# Patient Record
Sex: FEMALE | Race: WHITE | NOT HISPANIC OR LATINO | Employment: OTHER | URBAN - METROPOLITAN AREA
[De-identification: names, ages, dates, MRNs, and addresses within clinical notes are randomized per-mention and may not be internally consistent; named-entity substitution may affect disease eponyms.]

---

## 2017-03-02 ENCOUNTER — TRANSFERRED RECORDS (OUTPATIENT)
Dept: HEALTH INFORMATION MANAGEMENT | Facility: CLINIC | Age: 77
End: 2017-03-02

## 2017-07-21 ENCOUNTER — TRANSFERRED RECORDS (OUTPATIENT)
Dept: HEALTH INFORMATION MANAGEMENT | Facility: CLINIC | Age: 77
End: 2017-07-21

## 2017-08-04 ENCOUNTER — TRANSFERRED RECORDS (OUTPATIENT)
Dept: HEALTH INFORMATION MANAGEMENT | Facility: CLINIC | Age: 77
End: 2017-08-04

## 2017-08-21 ENCOUNTER — TRANSFERRED RECORDS (OUTPATIENT)
Dept: HEALTH INFORMATION MANAGEMENT | Facility: CLINIC | Age: 77
End: 2017-08-21

## 2017-08-21 ENCOUNTER — MEDICAL CORRESPONDENCE (OUTPATIENT)
Dept: HEALTH INFORMATION MANAGEMENT | Facility: CLINIC | Age: 77
End: 2017-08-21

## 2017-10-02 ENCOUNTER — TRANSFERRED RECORDS (OUTPATIENT)
Dept: HEALTH INFORMATION MANAGEMENT | Facility: CLINIC | Age: 77
End: 2017-10-02

## 2017-11-19 ASSESSMENT — ENCOUNTER SYMPTOMS
DISTURBANCES IN COORDINATION: 1
DOUBLE VISION: 1
HEADACHES: 0
DIZZINESS: 1
TREMORS: 0
MEMORY LOSS: 0
LOSS OF CONSCIOUSNESS: 0
SPEECH CHANGE: 0
PARALYSIS: 0
NUMBNESS: 0
WEAKNESS: 0
TINGLING: 1
SEIZURES: 0

## 2017-11-20 NOTE — TELEPHONE ENCOUNTER
APPT INFO    Date /Time: 11/27/17   Reason for Appt: Myasthenia Gravis with Diplopia    [facility] Provider[facility]   Are there internal records? If yes, list: No   Patient Contact (Y/N) & Call Details: No referred  Care is out of Neuro/Ophtha [facility]   Action: Requested records from [facility]     OUTSIDE RECORDS CHECKLIST     CLINIC NAME COMMENTS REC (x) IMG (x)   Kenneth Samaniego Records sent to clinic x na   Dr Buchanan [facility]  x na

## 2017-11-21 NOTE — TELEPHONE ENCOUNTER
Records Received From:  [facility]     Date/Exam/Location  (specify location if different)   Office Notes:  8/4/17, 7/21/17, 3/17/17, 3/2/17, 8/4/17, 2/24/17   Procedure Notes:  (EMG/EEG/ECG/LP) - EMG 3/2/17   Labs:  2017

## 2017-11-27 ENCOUNTER — PRE VISIT (OUTPATIENT)
Dept: NEUROLOGY | Facility: CLINIC | Age: 77
End: 2017-11-27

## 2017-11-27 ENCOUNTER — OFFICE VISIT (OUTPATIENT)
Dept: NEUROLOGY | Facility: CLINIC | Age: 77
End: 2017-11-27

## 2017-11-27 VITALS
BODY MASS INDEX: 17.84 KG/M2 | SYSTOLIC BLOOD PRESSURE: 116 MMHG | OXYGEN SATURATION: 97 % | HEIGHT: 66 IN | TEMPERATURE: 98.1 F | DIASTOLIC BLOOD PRESSURE: 60 MMHG | WEIGHT: 111 LBS | RESPIRATION RATE: 20 BRPM | HEART RATE: 64 BPM

## 2017-11-27 DIAGNOSIS — G70.01 MYASTHENIA GRAVIS WITH EXACERBATION (H): Primary | ICD-10-CM

## 2017-11-27 DIAGNOSIS — Z79.60 LONG-TERM USE OF IMMUNOSUPPRESSANT MEDICATION: ICD-10-CM

## 2017-11-27 PROBLEM — E78.5 HYPERLIPIDEMIA: Status: ACTIVE | Noted: 2017-11-02

## 2017-11-27 PROBLEM — G70.00 MYASTHENIA GRAVIS (H): Status: ACTIVE | Noted: 2017-03-20

## 2017-11-27 RX ORDER — MULTIVITAMIN WITH IRON
1000 TABLET ORAL EVERY MORNING
COMMUNITY

## 2017-11-27 RX ORDER — ACETAMINOPHEN 160 MG
2000 TABLET,DISINTEGRATING ORAL EVERY MORNING
COMMUNITY

## 2017-11-27 RX ORDER — IPRATROPIUM BROMIDE 42 UG/1
2 SPRAY, METERED NASAL PRN
COMMUNITY
End: 2024-07-22

## 2017-11-27 RX ORDER — PREDNISONE 10 MG/1
20 TABLET ORAL DAILY
COMMUNITY
Start: 2017-10-04 | End: 2018-01-24

## 2017-11-27 RX ORDER — MULTIVIT WITH MINERALS/LUTEIN
1 TABLET ORAL DAILY
COMMUNITY
End: 2020-01-20

## 2017-11-27 RX ORDER — ALENDRONATE SODIUM 70 MG/1
70 TABLET ORAL WEEKLY
COMMUNITY
End: 2024-07-22

## 2017-11-27 RX ORDER — SULFAMETHOXAZOLE AND TRIMETHOPRIM 400; 80 MG/1; MG/1
1 TABLET ORAL EVERY MORNING
COMMUNITY
Start: 2017-11-10 | End: 2018-09-17

## 2017-11-27 RX ORDER — CALCIUM CARBONATE 500(1250)
1 TABLET ORAL EVERY MORNING
COMMUNITY

## 2017-11-27 RX ORDER — HYDROCODONE/ACETAMINOPHEN 5 MG-500MG
1 TABLET ORAL EVERY MORNING
COMMUNITY

## 2017-11-27 RX ORDER — ACETAMINOPHEN 325 MG/1
650 TABLET ORAL ONCE
Status: CANCELLED
Start: 2018-01-02

## 2017-11-27 RX ORDER — DIPHENHYDRAMINE HCL 25 MG
50 CAPSULE ORAL ONCE
Status: CANCELLED | OUTPATIENT
Start: 2018-01-02

## 2017-11-27 RX ORDER — PYRIDOSTIGMINE BROMIDE 60 MG/1
120 TABLET ORAL 4 TIMES DAILY
COMMUNITY
End: 2018-03-02

## 2017-11-27 RX ORDER — DIPHENOXYLATE HYDROCHLORIDE AND ATROPINE SULFATE 2.5; .025 MG/1; MG/1
1 TABLET ORAL EVERY MORNING
COMMUNITY

## 2017-11-27 ASSESSMENT — PAIN SCALES - GENERAL: PAINLEVEL: NO PAIN (0)

## 2017-11-27 NOTE — PROGRESS NOTES
2017             Micheline Lopez MD   [address] [address]      Liv Buchanan MD   [facility]    [address] [place] [address]       RE: Zahida Cespedes   MRN: [id number]    : 1940      Dear Doctors:       I had the pleasure to see Zahida Cespedes at the [facility] today.  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  Zahida is a 77-year-old woman who developed double vision around .  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  At that time her ophthalmologist called [facility], and she was seen at [place] towards the end of .  She had a single fiber EMG from her orbicularis oculi and frontalis, which demonstrated significantly increased jitter and blocking, and she had positive acetylcholine receptor antibodies.  A CT scan of the chest showed an equivocal lesion in the mediastinum, but she subsequently saw thoracic surgeons at [place], and underwent n MRI of the chest in the spring of 2017 and a repeat CT a month ago   The lesion was no more appreciated, and therefore the issue of possible thymoma was closed.  In any case, her double vision turned out difficult to treat, but its nature has changed over time. It used to be initially direction changing and variable, but now it is a fixed double vision that develops when she gazes to the right, and the images are vertically and somewhat obliquely . It is clearly binocular, and still extremely annoying.  She does not acknowledge diurnal variation of this double vision pattern now,  after receiving MG treatment.  She also developed right eyelid ptosis when she was first seen at [place] for this diagnosis, and the ptosis was clearly worse then than it is now, but it has not completely resolved.  She  was treated with Mestinon up to 120 mg 4 times a day and prednisone, which was gradually escalated to 50 mg daily.  She stayed on that dose for a good 6 weeks and then was gradually tapered by Dr. Buchanan.  She was on 30 mg until 11/11/2017, and now on 25 mg, we plan to decrease to 20 mg after 2 weeks.  I asked her whether she has noticed any worsening of her ocular symptoms during the taper of prednisone from the 50 down to 25 mg a day.  She does not feel anything has changed in the past 2-3 months.  She denies dysphagia, dysarthria, sialorrhea, difficulty chewing, head drop, change in voice or speech, weakness of arms or legs, respiratory symptoms or any other signs of generalized disease.  She has discussed with Dr. Buchanan the option of a short-term IVIG or azathioprine trial.     PAST MEDICAL HISTORY:  Relatively free of disease.  She has osteopenia, mild allergic rhinitis.        ALLERGIES: Bee venom- reaction edema.      MEDICATIONS:  As listed in Epic record.      FAMILY HISTORY:  Negative for myasthenia or other autoimmune disorders.      SOCIAL HISTORY:  No smoking.  Rare alcohol use.  No illicit drug use.  Lives with .        REVIEW OF SYSTEMS:  A 14 point review of systems is negative except as per HPI.        PHYSICAL EXAMINATION:   VITAL SIGNS:  Her blood pressure is 116/60.  Pulse is 64 and regular.  Weight is 50.3 kg.  Height is 167.  She endorses no pain.  Respiratory rate is 20/min.  O2 sat is 97% on room air.     NEUROLOGIC:  She is awake, alert and oriented x3.  She can provide a coherent history.  Visual fields are full to confrontation bilaterally.  There is ptosis chiefly in the right eyelid that is somewhat fatigable and worse with sustained upward gaze.  There is a positive curtain sign when lifting manually the right eyelid and a positive Cogan lid twitch.  She has mild weakness of the right orbicularis oculi and minimal to none on the left.  She has vertical misalignment of the eyes,  which could be either right eye hypotropia or left eye hypertropia.  The maximal deviation of the eyes is when looking right and upward.  She does not have any weakness of the orbicularis oris, smile, uvula, jaw, palate or tongue.  Facial sensation is intact in all distributions of the trigeminal nerves, and hearing is intact to finger rub bilaterally.  Neck flexion and extension strength is 5. There is no dysphonia or dysarthria. Strength is 5/5 in all muscles of the upper and lower extremities proximally and distally.  She can raise from a chair with arms crossed on the chest 3 times without major difficulty.        IMPRESSION:  Ocular myasthenia, quite refractory to treatment.      I spent about 45 minutes with the patient, of which more than half was in counseling.  She is a difficult ocular myasthenia case, and we see those from time to time.  I think she did have a response to Mestinon and oral prednisone, but it was partial/incomplete, and probably less than what we were hoping for.  This may be due to atrophy of the extraocular muscles as seen in burnt-out ocular myasthenia, but I think that we should probably try some more medications before we reach that conclusion.  Options include IVIG and oral immunosuppressants.  I told the patient that, from my previous experience, the response of patients with ocular symptoms to medications other than prednisone or pyridostigmine is highly variable, hard to predict, and sometimes poor or none. She understands that.      I offered her IVIG 0.4 g/kg daily x5; hopefully we will get this approved through insurance. We will get a baseline IgA level and creatinine before starting the IVIG.  I want her to come to clinic right after completing the infusions, to see if this has made any difference.  If she and I do not appreciate any improvement of her ocular symptoms and signs, I do not want to continue the IVIG for more than a month, and I made it clear to her.  If this  fails, the option would be oral medications, like mycophenolate, azathioprine or cyclosporine.  I discussed with her how those 3 options compare. I personally like mycophenolate because it works a little quicker, but it is hard to get it approved through Medicare.  Azathioprine is cheaper, but takes considerably longer to work.  I explained to her that all those medications need careful lab monitoring because of the risk of cytopenias, liver toxicity, etc.  She understands that.  As a last resort, if oral immunosuppressants and IVIG fail to improve her ocular symptoms, I would consider consulting a neuro-ophthalmologist and asking the patient to put crutches in her glasses, which can manually correct the ptosis, or even consider the use of a prism to correct the double vision.  While prisms are generally not favored in myasthenia due to the highly fluctuating and variable nature of the diplopia, in her case, if the diplopia is due to a fixed vertical misalignment, then it may respond to a prism, and I would explore this option.  I will see the patient in followup the week after she receives the IVIG, which hopefully will be within the next 2 months.        Thank you for allowing me to participate in her care.  She should continue prednisone at 20 mg daily and the current dose of Mestinon and continue the precautions for chronic steroid use, including checking her blood pressure regularly, checking her blood sugars by her Primary Care doctor, exercise regularly, annual Ophthalmology followup to monitor for cataracts or glaucoma, taking calcium and vitamin D, bisphosphonates and monitoring bone density annually, etc.      Sincerely,      MD JUAN DAVID Faith MD             D: 2017 13:21   T: 2017 14:56   MT: merissa      Name:     TACOS LARES   MRN:      [id number]        Account:      [id number]   :      1940           Service Date: 2017      Document:  [id number]      Answers for HPI/ROS submitted by the patient on 11/19/2017   General Symptoms: No  Skin Symptoms: No  HENT Symptoms: No  EYE SYMPTOMS: Yes  HEART SYMPTOMS: No  LUNG SYMPTOMS: No  INTESTINAL SYMPTOMS: No  URINARY SYMPTOMS: No  GYNECOLOGIC SYMPTOMS: No  BREAST SYMPTOMS: No  SKELETAL SYMPTOMS: No  BLOOD SYMPTOMS: No  NERVOUS SYSTEM SYMPTOMS: Yes  MENTAL HEALTH SYMPTOMS: No  Double vision: Yes  Flashing of lights: Yes  Trouble with coordination: Yes  Dizziness or trouble with balance: Yes  Fainting or black-out spells: No  Memory loss: No  Headache: No  Seizures: No  Speech problems: No  Tingling: Yes  Tremor: No  Weakness: No  Difficulty walking: No  Paralysis: No  Numbness: No

## 2017-11-27 NOTE — MR AVS SNAPSHOT
After Visit Summary   11/27/2017    Zahida Cespedes    MRN: [id number]           Patient Information     Date Of Birth          1940        Visit Information        Provider Department      11/27/2017 12:50 PM Erick Fernandez MD [facility]        Today's Diagnoses     Myasthenia gravis with exacerbation (H)    -  1    Long-term use of immunosuppressant medication           Follow-ups after your visit        Your next 10 appointments already scheduled     Nov 27, 2017  2:00 PM CST   LAB with  LAB   [facility] ([facility])    [address] [place] [id number] [phone]           Please do not eat 10-12 hours before your appointment if you are coming in fasting for labs on lipids, cholesterol, or glucose (sugar). This does not apply to pregnant women. Water, hot tea and black coffee (with nothing added) are okay. Do not drink other fluids, diet soda or chew gum.              Future tests that were ordered for you today     Open Future Orders        Priority Expected Expires Ordered    Creatinine Routine  11/27/2018 11/27/2017    IgA Routine  11/27/2018 11/27/2017            Who to contact     Please call your clinic at [phone] to:    Ask questions about your health    Make or cancel appointments    Discuss your medicines    Learn about your test results    Speak to your doctor   If you have compliments or concerns about an experience at your clinic, or if you wish to file a complaint, please contact [facility] Patient Relations at [phone] or email us at [email or web address]         Additional Information About Your Visit        MyChart Information     [employer] gives you secure access to your electronic health record. If you see a primary care provider, you can also send messages to your care team and make appointments. If you have questions, please call your primary care  "clinic.  If you do not have a primary care provider, please call [phone] and they will assist you.      [employer] is an electronic gateway that provides easy, online access to your medical records. With [employer], you can request a clinic appointment, read your test results, renew a prescription or communicate with your care team.     To access your existing account, please contact your [facility] or call [phone] for assistance.        Care EveryWhere ID     This is your Care EveryWhere ID. This could be used by other organizations to access your [facility] medical records  [id number]        Your Vitals Were     Pulse Temperature Respirations Height Pulse Oximetry Breastfeeding?    64 98.1  F (36.7  C) 20 1.676 m (5' 6\") 97% No    BMI (Body Mass Index)                   17.92 kg/m2            Blood Pressure from Last 3 Encounters:   11/27/17 116/60    Weight from Last 3 Encounters:   11/27/17 50.3 kg (111 lb)               Primary Care Provider    None Specified       No primary provider on file.        Equal Access to Services     LISA FONTAINE : Hadii liliana ku hadasho Soomaali, waaxda luqadaha, qaybta kaalmada adeegyada, joi meehan . So [facility] [phone].    ATENCIÓN: Si habla español, tiene a torres disposición servicios gratuitos de asistencia lingüística. Llame al [phone].    We comply with applicable federal civil rights laws and [place] laws. We do not discriminate on the basis of race, color, national origin, age, disability, sex, sexual orientation, or gender identity.            Thank you!     Thank you for choosing [facility]  for your care. Our goal is always to provide you with excellent care. Hearing back from our patients is one way we can continue to improve our services. Please take a few minutes to complete the written survey that you may receive in the mail after your visit with us. Thank you!             Your Updated " Medication List - Protect others around you: Learn how to safely use, store and throw away your medicines at www.disposemymeds.org.          This list is accurate as of: 11/27/17  1:14 PM.  Always use your most recent med list.                   Brand Name Dispense Instructions for use Diagnosis    alendronate 70 MG tablet    FOSAMAX     Take 70 mg by mouth once a week        ascorbic acid 1000 MG Tabs    vitamin C     Take 1 tablet by mouth daily        FISH OIL CONCENTRATE 300 MG Caps      Take 1,000 mg by mouth daily        ipratropium 0.06 % spray    ATROVENT     Spray 2 sprays in nostril as needed        Lutein 6 MG Caps      Take 1 capsule by mouth daily        MULTI-VITAMINS Tabs      Take 1 tablet by mouth daily        predniSONE 10 MG tablet    DELTASONE     Take 2.5 tablets by mouth daily        pyridostigmine 60 MG tablet    MESTINON     Take 120 mg by mouth 4 times daily        RA CALCIUM 1250 MG tablet   Generic drug:  calcium carbonate      Take 1 tablet by mouth daily        sulfamethoxazole-trimethoprim 400-80 MG per tablet    BACTRIM/SEPTRA     Take 1 tablet by mouth every morning        VITAMIN B COMPLEX PO      Take 1 capsule by mouth daily        vitamin D3 2000 UNITS Caps      Take 2,000 Units by mouth daily

## 2017-11-27 NOTE — LETTER
2017       RE: Zahida Cespedes  [address] [facility] [facility] [address]     Dear Colleague,    Thank you for referring your patient, Zahida Cespedes, to the [facility] at [facility]. Please see a copy of my visit note below.    2017          RE: Zahida Cespedes   MRN: [id number]    : 1940      Dear Doctors:       I had the pleasure to see Zahida Cespedes at the [facility] today.  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  Zahida is a 77-year-old woman who developed double vision around .  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  At that time her ophthalmologist called [facility], and she was seen at [place] towards the end of .  She had a single fiber EMG from her orbicularis oculi and frontalis, which demonstrated significantly increased jitter and blocking, and she had positive acetylcholine receptor antibodies.  A CT scan of the chest showed an equivocal lesion in the mediastinum, but she subsequently saw thoracic surgeons at [place] [facility], and underwent n MRI of the chest in the spring of 2017 and a repeat CT a month ago   The lesion was no more appreciated, and therefore the issue of possible thymoma was closed.  In any case, her double vision turned out difficult to treat, but its nature has changed over time. It used to be initially direction changing and variable, but now it is a fixed double vision that develops when she gazes to the right, and the images are vertically and somewhat obliquely . It is clearly binocular, and still extremely annoying.  She does not acknowledge diurnal variation of this double vision pattern now,  after receiving MG treatment.  She also developed right eyelid ptosis when she was first seen at [place] [facility] for this diagnosis,  and the ptosis was clearly worse then than it is now, but it has not completely resolved.  She was treated with Mestinon up to 120 mg 4 times a day and prednisone, which was gradually escalated to 50 mg daily.  She stayed on that dose for a good 6 weeks and then was gradually tapered by Dr. Buchanan.  She was on 30 mg until 11/11/2017, and now on 25 mg, we plan to decrease to 20 mg after 2 weeks.  I asked her whether she has noticed any worsening of her ocular symptoms during the taper of prednisone from the 50 down to 25 mg a day.  She does not feel anything has changed in the past 2-3 months.  She denies dysphagia, dysarthria, sialorrhea, difficulty chewing, head drop, change in voice or speech, weakness of arms or legs, respiratory symptoms or any other signs of generalized disease.  She has discussed with Dr. Buchanan the option of a short-term IVIG or azathioprine trial.     PAST MEDICAL HISTORY:  Relatively free of disease.  She has osteopenia, mild allergic rhinitis.        ALLERGIES: Bee venom- reaction edema.      MEDICATIONS:  As listed in Epic record.      FAMILY HISTORY:  Negative for myasthenia or other autoimmune disorders.      SOCIAL HISTORY:  No smoking.  Rare alcohol use.  No illicit drug use.  Lives with .        REVIEW OF SYSTEMS:  A 14 point review of systems is negative except as per HPI.        PHYSICAL EXAMINATION:   VITAL SIGNS:  Her blood pressure is 116/60.  Pulse is 64 and regular.  Weight is 50.3 kg.  Height is 167.  She endorses no pain.  Respiratory rate is 20/min.  O2 sat is 97% on room air.     NEUROLOGIC:  She is awake, alert and oriented x3.  She can provide a coherent history.  Visual fields are full to confrontation bilaterally.  There is ptosis chiefly in the right eyelid that is somewhat fatigable and worse with sustained upward gaze.  There is a positive curtain sign when lifting manually the right eyelid and a positive Cogan lid twitch.  She has mild weakness of the right  orbicularis oculi and minimal to none on the left.  She has vertical misalignment of the eyes, which could be either right eye hypotropia or left eye hypertropia.  The maximal deviation of the eyes is when looking right and upward.  She does not have any weakness of the orbicularis oris, smile, uvula, jaw, palate or tongue.  Facial sensation is intact in all distributions of the trigeminal nerves, and hearing is intact to finger rub bilaterally.  Neck flexion and extension strength is 5. There is no dysphonia or dysarthria. Strength is 5/5 in all muscles of the upper and lower extremities proximally and distally.  She can raise from a chair with arms crossed on the chest 3 times without major difficulty.        IMPRESSION:  Ocular myasthenia, quite refractory to treatment.      I spent about 45 minutes with the patient, of which more than half was in counseling.  She is a difficult ocular myasthenia case, and we see those from time to time.  I think she did have a response to Mestinon and oral prednisone, but it was partial/incomplete, and probably less than what we were hoping for.  This may be due to atrophy of the extraocular muscles as seen in burnt-out ocular myasthenia, but I think that we should probably try some more medications before we reach that conclusion.  Options include IVIG and oral immunosuppressants.  I told the patient that, from my previous experience, the response of patients with ocular symptoms to medications other than prednisone or pyridostigmine is highly variable, hard to predict, and sometimes poor or none. She understands that.      I offered her IVIG 0.4 g/kg daily x5; hopefully we will get this approved through insurance. We will get a baseline IgA level and creatinine before starting the IVIG.  I want her to come to clinic right after completing the infusions, to see if this has made any difference.  If she and I do not appreciate any improvement of her ocular symptoms and signs, I  do not want to continue the IVIG for more than a month, and I made it clear to her.  If this fails, the option would be oral medications, like mycophenolate, azathioprine or cyclosporine.  I discussed with her how those 3 options compare. I personally like mycophenolate because it works a little quicker, but it is hard to get it approved through Medicare.  Azathioprine is cheaper, but takes considerably longer to work.  I explained to her that all those medications need careful lab monitoring because of the risk of cytopenias, liver toxicity, etc.  She understands that.  As a last resort, if oral immunosuppressants and IVIG fail to improve her ocular symptoms, I would consider consulting a neuro-ophthalmologist and asking the patient to put crutches in her glasses, which can manually correct the ptosis, or even consider the use of a prism to correct the double vision.  While prisms are generally not favored in myasthenia due to the highly fluctuating and variable nature of the diplopia, in her case, if the diplopia is due to a fixed vertical misalignment, then it may respond to a prism, and I would explore this option.  I will see the patient in followup the week after she receives the IVIG, which hopefully will be within the next 2 months.        Thank you for allowing me to participate in her care.  She should continue prednisone at 20 mg daily and the current dose of Mestinon and continue the precautions for chronic steroid use, including checking her blood pressure regularly, checking her blood sugars by her Primary Care doctor, exercise regularly, annual Ophthalmology followup to monitor for cataracts or glaucoma, taking calcium and vitamin D, bisphosphonates and monitoring bone density annually, etc.     JUAN DAVID IVY MD             D: 2017 13:21   T: 2017 14:56   MT: merissa      Name:     TACOS LARES   MRN:      [id number]        Account:      [id number]   :      1940            Service Date: 11/27/2017      Document: [id number]            Again, thank you for allowing me to participate in the care of your patient.      Sincerely,    Erick Fernandez MD    CC:     [place] [address]      Liv Buchanan MD   [facility]    [address] [place] [address]

## 2017-11-27 NOTE — NURSING NOTE
Chief Complaint   Patient presents with     Consult     [facility]- MYASTHENIA GRAVIS-SANDEE Laureano, CMA

## 2017-11-28 ENCOUNTER — TELEPHONE (OUTPATIENT)
Dept: NEUROLOGY | Facility: CLINIC | Age: 77
End: 2017-11-28

## 2017-11-28 NOTE — TELEPHONE ENCOUNTER
Patient was seen in clinic yesterday and left before we were able to get her AVS to her. She did not have her labs drawn. These orders have been faxed to [facility] in [facility] (phone:[phone] fax: [phone]).  Patient will also need a round of IVIG. She prefers to do this after the Christmas holiday and would like to get it done at [facility]. We will connect in about a month to set up the infusions. Dr. Fernandez would like to see her back in clinic 1 week post IVIG therapy.     Mary Anne Chavez, RN Care Coordinator

## 2017-12-27 ENCOUNTER — TELEPHONE (OUTPATIENT)
Dept: NEUROLOGY | Facility: CLINIC | Age: 77
End: 2017-12-27

## 2017-12-27 NOTE — TELEPHONE ENCOUNTER
Prior Authorization Infusion/Clinic Administered Request    Location: [facility], [facility]  Diagnosis and ICD: Myasthenia Gravis G70.01  Drug/Therapy: IVIG 0.4 g/kg QD x 5 days    Previously Tried and Failed Therapies: prednisone, Mestinon    Date of provider note with supporting information: 11/27/17    Urgency (When is the patient scheduled?): Medium (within a week)    Would you like to include any research articles? na   If yes please call [phone] for further instructions about sending that information

## 2017-12-27 NOTE — TELEPHONE ENCOUNTER
Call placed to patient to let her know she can go ahead and schedule. Gave her the number to  Gerardo.     Mary Anne Chavez, RN Care Coordinator

## 2018-01-15 ENCOUNTER — INFUSION THERAPY VISIT (OUTPATIENT)
Dept: INFUSION THERAPY | Facility: CLINIC | Age: 78
End: 2018-01-15
Attending: PSYCHIATRY & NEUROLOGY
Payer: MEDICARE

## 2018-01-15 VITALS
DIASTOLIC BLOOD PRESSURE: 53 MMHG | SYSTOLIC BLOOD PRESSURE: 117 MMHG | RESPIRATION RATE: 16 BRPM | WEIGHT: 116 LBS | BODY MASS INDEX: 18.72 KG/M2 | HEART RATE: 67 BPM | TEMPERATURE: 97.8 F | OXYGEN SATURATION: 100 %

## 2018-01-15 DIAGNOSIS — G70.01 MYASTHENIA GRAVIS WITH EXACERBATION (H): Primary | ICD-10-CM

## 2018-01-15 PROCEDURE — A9270 NON-COVERED ITEM OR SERVICE: HCPCS | Mod: GY | Performed by: PSYCHIATRY & NEUROLOGY

## 2018-01-15 PROCEDURE — 96375 TX/PRO/DX INJ NEW DRUG ADDON: CPT

## 2018-01-15 PROCEDURE — 25000128 H RX IP 250 OP 636: Performed by: PSYCHIATRY & NEUROLOGY

## 2018-01-15 PROCEDURE — 25000132 ZZH RX MED GY IP 250 OP 250 PS 637: Mod: GY | Performed by: PSYCHIATRY & NEUROLOGY

## 2018-01-15 PROCEDURE — 96366 THER/PROPH/DIAG IV INF ADDON: CPT

## 2018-01-15 PROCEDURE — 96365 THER/PROPH/DIAG IV INF INIT: CPT

## 2018-01-15 RX ORDER — ACETAMINOPHEN 325 MG/1
650 TABLET ORAL ONCE
Status: CANCELLED
Start: 2018-01-15

## 2018-01-15 RX ORDER — DIPHENHYDRAMINE HCL 25 MG
50 CAPSULE ORAL ONCE
Status: CANCELLED | OUTPATIENT
Start: 2018-01-15

## 2018-01-15 RX ORDER — DIPHENHYDRAMINE HCL 25 MG
50 CAPSULE ORAL ONCE
Status: COMPLETED | OUTPATIENT
Start: 2018-01-15 | End: 2018-01-15

## 2018-01-15 RX ORDER — ACETAMINOPHEN 325 MG/1
650 TABLET ORAL ONCE
Status: COMPLETED | OUTPATIENT
Start: 2018-01-15 | End: 2018-01-15

## 2018-01-15 RX ADMIN — ACETAMINOPHEN 650 MG: 325 TABLET ORAL at 13:00

## 2018-01-15 RX ADMIN — DIPHENHYDRAMINE HYDROCHLORIDE 50 MG: 25 CAPSULE ORAL at 13:00

## 2018-01-15 RX ADMIN — HUMAN IMMUNOGLOBULIN G 20 G: 20 LIQUID INTRAVENOUS at 13:26

## 2018-01-15 RX ADMIN — HYDROCORTISONE SODIUM SUCCINATE 100 MG: 100 INJECTION, POWDER, FOR SOLUTION INTRAMUSCULAR; INTRAVENOUS at 13:02

## 2018-01-15 ASSESSMENT — PAIN SCALES - GENERAL: PAINLEVEL: NO PAIN (0)

## 2018-01-15 NOTE — MR AVS SNAPSHOT
After Visit Summary   1/15/2018    Zahida Cespedes    MRN: [id number]           Patient Information     Date Of Birth          1940        Visit Information        Provider Department      1/15/2018 12:30 PM RH INFUSION CHAIR 10 [facility] Infusion Services        Today's Diagnoses     Myasthenia gravis with exacerbation (H)    -  1       Follow-ups after your visit        Your next 10 appointments already scheduled     Jan 16, 2018  1:30 PM CST   Level 3 with RH INFUSION CHAIR 4   [facility] Infusion Services ([facility])    [facility]  [address]  [facility] [address]   [phone]            Jan 17, 2018 12:30 PM CST   Level 3 with RH INFUSION CHAIR 2   [facility] Infusion Services ([facility])    [facility]  [address]  [facility] [address]   [phone]            Jan 18, 2018 12:30 PM CST   Level 3 with RH INFUSION CHAIR 3   [facility] Infusion Services ([facility])    [facility] [place] [facility]  [address]  [facility] [address]   [phone]            Jan 19, 2018 12:30 PM CST   Level 3 with RH INFUSION CHAIR 8   [facility] Infusion Services ([facility])    [facility]  [address]  [facility] [address]   [phone]            Jan 24, 2018 11:20 AM CST   (Arrive by 11:05 AM)   Return Myasthenia Gravis with Erick Fernandez MD   [facility] Neurology ([facility])    [address] [place] [address] [phone]              Who to contact     If you have questions or need follow up information about today's clinic visit or your schedule please contact [facility] INFUSION SERVICES directly at [phone].  Normal or  non-critical lab and imaging results will be communicated to you by MyChart, letter or phone within 4 business days after the clinic has received the results. If you do not hear from us within 7 days, please contact the clinic through [employer] or phone. If you have a critical or abnormal lab result, we will notify you by phone as soon as possible.  Submit refill requests through "[employer] or call your pharmacy and they will forward the refill request to us. Please allow 3 business days for your refill to be completed.          Additional Information About Your Visit        "[employer] Information     "[employer] gives you secure access to your electronic health record. If you see a primary care provider, you can also send messages to your care team and make appointments. If you have questions, please call your primary care clinic.  If you do not have a primary care provider, please call [phone] and they will assist you.        Care EveryWhere ID     This is your Care EveryWhere ID. This could be used by other organizations to access your [place] medical records  [id number]        Your Vitals Were     Pulse Temperature Respirations Pulse Oximetry BMI (Body Mass Index)       67 97.8  F (36.6  C) (Tympanic) 16 100% 18.72 kg/m2        Blood Pressure from Last 3 Encounters:   01/15/18 117/53   11/27/17 116/60    Weight from Last 3 Encounters:   01/15/18 52.6 kg (116 lb)   11/27/17 50.3 kg (111 lb)              Today, you had the following     No orders found for display       Primary Care Provider Fax #    Provider Not In System [phone]                Equal Access to Services     LOUISE FONTAINE : Hadii aad ku hadasho Socolleenali, waaxda luqadaha, qaybta kaalmada michaelegyada, joi meehan . So [facility] [phone].    ATENCIÓN: Si habla español, tiene a torres disposición servicios gratuitos de asistencia lingüística. Llame al [phone].    We comply with applicable federal civil rights laws and  [place] laws. We do not discriminate on the basis of race, color, national origin, age, disability, sex, sexual orientation, or gender identity.            Thank you!     Thank you for choosing [facility] INFUSION SERVICES  for your care. Our goal is always to provide you with excellent care. Hearing back from our patients is one way we can continue to improve our services. Please take a few minutes to complete the written survey that you may receive in the mail after your visit with us. Thank you!             Your Updated Medication List - Protect others around you: Learn how to safely use, store and throw away your medicines at www.disposemymeds.org.          This list is accurate as of: 1/15/18  3:53 PM.  Always use your most recent med list.                   Brand Name Dispense Instructions for use Diagnosis    alendronate 70 MG tablet    FOSAMAX     Take 70 mg by mouth once a week        ascorbic acid 1000 MG Tabs    vitamin C     Take 1 tablet by mouth daily        FISH OIL CONCENTRATE 300 MG Caps      Take 1,000 mg by mouth daily        ipratropium 0.06 % spray    ATROVENT     Spray 2 sprays in nostril as needed        Lutein 6 MG Caps      Take 1 capsule by mouth daily        MULTI-VITAMINS Tabs      Take 1 tablet by mouth daily        predniSONE 10 MG tablet    DELTASONE     Take 20 mg by mouth daily        pyridostigmine 60 MG tablet    MESTINON     Take 120 mg by mouth 4 times daily        RA CALCIUM 1250 MG tablet   Generic drug:  calcium carbonate      Take 1 tablet by mouth daily        sulfamethoxazole-trimethoprim 400-80 MG per tablet    BACTRIM/SEPTRA     Take 1 tablet by mouth every morning        VITAMIN B COMPLEX PO      Take 1 capsule by mouth daily        vitamin D3 2000 UNITS Caps      Take 2,000 Units by mouth daily

## 2018-01-15 NOTE — PROGRESS NOTES
"Infusion Nursing Note:  Zahida Cespedes presents today for IVIG 1st dose.    Patient seen by provider today: No   present during visit today: Not Applicable.    Note: Reviewed potential SE of IVIG including allergic reaction.  Patient verbalized understanding and verbally agreed to infusion.  IVIG started at 0.5ml/kg/hr and rate increased by 0.5ml/kg/hr every 15 min. for a max of 4ml/kg/hr    Intravenous Access:  Peripheral IV placed.    Treatment Conditions:  Rheumatology Infusion Checklist: PRIOR TO INFUSION OF BIOLOGICAL MEDICATIONS OR ANY OF THESE AS LISTED: Immune Globulin (IVIG) \".rheumbiologicalchecklist\"    Prior to Infusion of biological medications or any of these as listed:    1. Elevated temperature, fever, chills, productive cough or abnormal vital signs, night sweats, coughing up blood or sputum, no appetite or abnormal vital signs : NO    2. Open wounds or new incisions: NO    3. Recent hospitalization: NO    4.  Recent surgeries:  NO    5. Any upcoming surgeries or dental procedures?:NO    6. Any current or recent bouts of illness or infection? On any antibiotics? : Yes, on ABX because she is on Prednisone    7. Any new, sudden or worsening abdominal pain :NO    8. Vaccination within 4 weeks? Patient or someone in the household is scheduled to receive vaccination? No live virus vaccines prior to or during treatment :NO    9. Any nervous system diseases [i.e. multiple sclerosis, Guillain-[place], seizures, neurological  changes]: YES Myasthenia Gravis  10. Pregnant or breast feeding; or plans on pregnancy in the future: NO    11. Signs of worsening depression or suicidal ideations while taking benlysta:NO    12. New-onset medical symptoms: NO    13.  New cancer diagnosis or on chemotherapy or radiation NO    14.  Evaluate for any sign of active TB [Unexplained weight loss, Loss of appetite, Night sweats, Fever, Fatigue, Chills, Coughing for 3 weeks or longer, Hemoptysis (coughing up blood), " Chest pain]: NO    **Note: If answered yes to any of the above, hold the infusion and contact ordering rheumatologist or on-call rheumatologist.   .        Post Infusion Assessment:  Patient tolerated infusion without incident.  Blood return noted pre and post infusion.  Site patent and intact, free from redness, edema or discomfort.  No evidence of extravasations.  Access discontinued per protocol.  Rheumatology Post Infusion: POST-INFUSION OF BIOLOGICAL MEDICATION:    Reviewed with patient.  Given biologic medication or medication hand-out. Inform patient if any fever, chills or signs of infection, new symptoms, abdominal pain, heart palpitations, shortness of breath, reaction, weakness, neurological changes, seek medical attention immediately and should not receive infusions. No live virus vaccines prior to or during treatment or up to 6 months post infusion. If the patient has an upcoming procedure or surgery, this should be discussed with the rheumatologist and surgeon or provider..      Discharge Plan:   Discharge instructions reviewed with: Patient.  Patient discharged in stable condition accompanied by: .  Departure Mode: Ambulatory.  Next IVIG infusion scheduled for tomorrow.    NEDA BLOUNT RN

## 2018-01-16 ENCOUNTER — INFUSION THERAPY VISIT (OUTPATIENT)
Dept: INFUSION THERAPY | Facility: CLINIC | Age: 78
End: 2018-01-16
Attending: PSYCHIATRY & NEUROLOGY
Payer: MEDICARE

## 2018-01-16 VITALS
RESPIRATION RATE: 16 BRPM | SYSTOLIC BLOOD PRESSURE: 119 MMHG | HEART RATE: 64 BPM | TEMPERATURE: 99 F | DIASTOLIC BLOOD PRESSURE: 58 MMHG

## 2018-01-16 DIAGNOSIS — G70.01 MYASTHENIA GRAVIS WITH EXACERBATION (H): Primary | ICD-10-CM

## 2018-01-16 PROCEDURE — 96375 TX/PRO/DX INJ NEW DRUG ADDON: CPT

## 2018-01-16 PROCEDURE — 96366 THER/PROPH/DIAG IV INF ADDON: CPT

## 2018-01-16 PROCEDURE — 25000128 H RX IP 250 OP 636: Performed by: PSYCHIATRY & NEUROLOGY

## 2018-01-16 PROCEDURE — 25000132 ZZH RX MED GY IP 250 OP 250 PS 637: Mod: GY | Performed by: PSYCHIATRY & NEUROLOGY

## 2018-01-16 PROCEDURE — 96365 THER/PROPH/DIAG IV INF INIT: CPT

## 2018-01-16 PROCEDURE — A9270 NON-COVERED ITEM OR SERVICE: HCPCS | Mod: GY | Performed by: PSYCHIATRY & NEUROLOGY

## 2018-01-16 RX ORDER — ACETAMINOPHEN 325 MG/1
650 TABLET ORAL ONCE
Status: COMPLETED | OUTPATIENT
Start: 2018-01-16 | End: 2018-01-16

## 2018-01-16 RX ORDER — DIPHENHYDRAMINE HCL 25 MG
50 CAPSULE ORAL ONCE
Status: CANCELLED | OUTPATIENT
Start: 2018-01-16

## 2018-01-16 RX ORDER — DIPHENHYDRAMINE HCL 25 MG
50 CAPSULE ORAL ONCE
Status: COMPLETED | OUTPATIENT
Start: 2018-01-16 | End: 2018-01-16

## 2018-01-16 RX ORDER — ACETAMINOPHEN 325 MG/1
650 TABLET ORAL ONCE
Status: CANCELLED
Start: 2018-01-16

## 2018-01-16 RX ADMIN — HUMAN IMMUNOGLOBULIN G 20 G: 20 LIQUID INTRAVENOUS at 14:20

## 2018-01-16 RX ADMIN — HYDROCORTISONE SODIUM SUCCINATE 100 MG: 100 INJECTION, POWDER, FOR SOLUTION INTRAMUSCULAR; INTRAVENOUS at 14:18

## 2018-01-16 RX ADMIN — DIPHENHYDRAMINE HYDROCHLORIDE 50 MG: 25 CAPSULE ORAL at 13:57

## 2018-01-16 RX ADMIN — ACETAMINOPHEN 650 MG: 325 TABLET ORAL at 13:57

## 2018-01-16 NOTE — PROGRESS NOTES
"Infusion Nursing Note:  Zahida Cespedes presents today for IVIG day 2.    Patient seen by provider today: No   present during visit today: Not Applicable.    Note: N/A.    Intravenous Access:  Peripheral IV placed.    Treatment Conditions:    Infusion started at 0.5 ml/kg/hr  Increased by 0.5 ml/kg/hr every 15 minutes to max of 4 ml/kg/hr   VS checked every 30 minutes until at max  Rheumatology Infusion Checklist: PRIOR TO INFUSION OF BIOLOGICAL MEDICATIONS OR ANY OF THESE AS LISTED: Immune Globulin (IVIG) \".rheumbiologicalchecklist\"    Prior to Infusion of biological medications or any of these as listed:    1. Elevated temperature, fever, chills, productive cough or abnormal vital signs, night sweats, coughing up blood or sputum, no appetite or abnormal vital signs : NO    2. Open wounds or new incisions: NO    3. Recent hospitalization: NO    4.  Recent surgeries:  NO    5. Any upcoming surgeries or dental procedures?:NO    6. Any current or recent bouts of illness or infection? On any antibiotics? : NO    7. Any new, sudden or worsening abdominal pain :NO    8. Vaccination within 4 weeks? Patient or someone in the household is scheduled to receive vaccination? No live virus vaccines prior to or during treatment :NO    9. Any nervous system diseases [i.e. multiple sclerosis, Guillain-[place], seizures, neurological  changes]: NO    10. Pregnant or breast feeding; or plans on pregnancy in the future: NO    11. Signs of worsening depression or suicidal ideations while taking benlysta:NO    12. New-onset medical symptoms: NO    13.  New cancer diagnosis or on chemotherapy or radiation NO    14.  Evaluate for any sign of active TB [Unexplained weight loss, Loss of appetite, Night sweats, Fever, Fatigue, Chills, Coughing for 3 weeks or longer, Hemoptysis (coughing up blood), Chest pain]: NO    **Note: If answered yes to any of the above, hold the infusion and contact ordering rheumatologist or on-call " rheumatologist.   .  Post Infusion Assessment:  Patient tolerated infusion without incident.  Site patent and intact, free from redness, edema or discomfort.  No evidence of extravasations.  Access discontinued per protocol.  Rheumatology Post Infusion: POST-INFUSION OF BIOLOGICAL MEDICATION:    Reviewed with patient.  Given biologic medication or medication hand-out. Inform patient if any fever, chills or signs of infection, new symptoms, abdominal pain, heart palpitations, shortness of breath, reaction, weakness, neurological changes, seek medical attention immediately and should not receive infusions. No live virus vaccines prior to or during treatment or up to 6 months post infusion. If the patient has an upcoming procedure or surgery, this should be discussed with the rheumatologist and surgeon or provider..      Discharge Plan:   Discharge instructions reviewed with: Patient.  AVS to patient via [employer].  Patient will return 1/17 for next appointment.   Patient discharged in stable condition accompanied by: self.  Departure Mode: Ambulatory.    Rachana Villegas RN

## 2018-01-16 NOTE — MR AVS SNAPSHOT
After Visit Summary   1/16/2018    Zahida Cespedes    MRN: [id number]           Patient Information     Date Of Birth          1940        Visit Information        Provider Department      1/16/2018 1:30 PM RH INFUSION CHAIR 4 [facility] Infusion Services        Today's Diagnoses     Myasthenia gravis with exacerbation (H)    -  1       Follow-ups after your visit        Your next 10 appointments already scheduled     Jan 17, 2018  1:00 PM CST   Level 3 with RH INFUSION CHAIR 2   [facility] Infusion Services ([facility])    [facility]  [address]  [facility] [address]   [phone]            Jan 18, 2018 12:30 PM CST   Level 3 with RH INFUSION CHAIR 3   [facility] Infusion Services ([facility])    [facility]  [address]  [facility] [address]   [phone]            Jan 19, 2018 12:30 PM CST   Level 3 with RH INFUSION CHAIR 8   [facility] Infusion Services ([facility])    [facility]  [address]  [facility] [address]   [phone]            Jan 24, 2018 11:20 AM CST   (Arrive by 11:05 AM)   Return Myasthenia Gravis with Erick Fernandez MD   [facility] Neurology ([facility])    [address] [place] [address] [phone]              Who to contact     If you have questions or need follow up information about today's clinic visit or your schedule please contact  INFUSION SERVICES directly at [phone].  Normal or non-critical lab and imaging results will be communicated to you by MyChart, letter or phone within 4 business days after the clinic has received the results. If you do not hear from us within 7 days, please contact the clinic through MyChart or phone. If you  have a critical or abnormal lab result, we will notify you by phone as soon as possible.  Submit refill requests through [employer] or call your pharmacy and they will forward the refill request to us. Please allow 3 business days for your refill to be completed.          Additional Information About Your Visit        [employer] Information     [employer] gives you secure access to your electronic health record. If you see a primary care provider, you can also send messages to your care team and make appointments. If you have questions, please call your primary care clinic.  If you do not have a primary care provider, please call [phone] and they will assist you.        Care EveryWhere ID     This is your Care EveryWhere ID. This could be used by other organizations to access your [place] medical records  [id number]        Your Vitals Were     Pulse Temperature Respirations             64 99  F (37.2  C) 16          Blood Pressure from Last 3 Encounters:   01/16/18 119/58   01/15/18 117/53   11/27/17 116/60    Weight from Last 3 Encounters:   01/15/18 52.6 kg (116 lb)   11/27/17 50.3 kg (111 lb)              Today, you had the following     No orders found for display       Primary Care Provider Fax #    Provider Not In System [phone]                Equal Access to Services     LOUISE FONTAINE : Hadyolanda [place], wabibida scott, qaybta kaalmada bennett, joi ferrell. So [facility] [phone].    ATENCIÓN: Si habla español, tiene a torres disposición servicios gratuitos de asistencia lingüística. Llame al [phone].    We comply with applicable federal civil rights laws and [place] laws. We do not discriminate on the basis of race, color, national origin, age, disability, sex, sexual orientation, or gender identity.            Thank you!     Thank you for choosing [facility] INFUSION SERVICES  for your care. Our goal is always to provide you with excellent  care. Hearing back from our patients is one way we can continue to improve our services. Please take a few minutes to complete the written survey that you may receive in the mail after your visit with us. Thank you!             Your Updated Medication List - Protect others around you: Learn how to safely use, store and throw away your medicines at www.disposemymeds.org.          This list is accurate as of: 1/16/18  4:14 PM.  Always use your most recent med list.                   Brand Name Dispense Instructions for use Diagnosis    alendronate 70 MG tablet    FOSAMAX     Take 70 mg by mouth once a week        ascorbic acid 1000 MG Tabs    vitamin C     Take 1 tablet by mouth daily        FISH OIL CONCENTRATE 300 MG Caps      Take 1,000 mg by mouth daily        ipratropium 0.06 % spray    ATROVENT     Spray 2 sprays in nostril as needed        Lutein 6 MG Caps      Take 1 capsule by mouth daily        MULTI-VITAMINS Tabs      Take 1 tablet by mouth daily        predniSONE 10 MG tablet    DELTASONE     Take 20 mg by mouth daily        pyridostigmine 60 MG tablet    MESTINON     Take 120 mg by mouth 4 times daily        RA CALCIUM 1250 MG tablet   Generic drug:  calcium carbonate      Take 1 tablet by mouth daily        sulfamethoxazole-trimethoprim 400-80 MG per tablet    BACTRIM/SEPTRA     Take 1 tablet by mouth every morning        VITAMIN B COMPLEX PO      Take 1 capsule by mouth daily        vitamin D3 2000 UNITS Caps      Take 2,000 Units by mouth daily

## 2018-01-17 ENCOUNTER — INFUSION THERAPY VISIT (OUTPATIENT)
Dept: INFUSION THERAPY | Facility: CLINIC | Age: 78
End: 2018-01-17
Attending: PSYCHIATRY & NEUROLOGY
Payer: MEDICARE

## 2018-01-17 VITALS — DIASTOLIC BLOOD PRESSURE: 71 MMHG | HEART RATE: 68 BPM | TEMPERATURE: 97.9 F | SYSTOLIC BLOOD PRESSURE: 132 MMHG

## 2018-01-17 DIAGNOSIS — G70.01 MYASTHENIA GRAVIS WITH EXACERBATION (H): Primary | ICD-10-CM

## 2018-01-17 PROCEDURE — A9270 NON-COVERED ITEM OR SERVICE: HCPCS | Mod: GY | Performed by: PSYCHIATRY & NEUROLOGY

## 2018-01-17 PROCEDURE — 96365 THER/PROPH/DIAG IV INF INIT: CPT

## 2018-01-17 PROCEDURE — 96366 THER/PROPH/DIAG IV INF ADDON: CPT

## 2018-01-17 PROCEDURE — 25000128 H RX IP 250 OP 636: Performed by: PSYCHIATRY & NEUROLOGY

## 2018-01-17 PROCEDURE — 25000132 ZZH RX MED GY IP 250 OP 250 PS 637: Mod: GY | Performed by: PSYCHIATRY & NEUROLOGY

## 2018-01-17 PROCEDURE — 96375 TX/PRO/DX INJ NEW DRUG ADDON: CPT

## 2018-01-17 RX ORDER — DIPHENHYDRAMINE HCL 25 MG
50 CAPSULE ORAL ONCE
Status: CANCELLED | OUTPATIENT
Start: 2018-01-17

## 2018-01-17 RX ORDER — DIPHENHYDRAMINE HCL 25 MG
50 CAPSULE ORAL ONCE
Status: COMPLETED | OUTPATIENT
Start: 2018-01-17 | End: 2018-01-17

## 2018-01-17 RX ORDER — ACETAMINOPHEN 325 MG/1
650 TABLET ORAL ONCE
Status: COMPLETED | OUTPATIENT
Start: 2018-01-17 | End: 2018-01-17

## 2018-01-17 RX ORDER — ACETAMINOPHEN 325 MG/1
650 TABLET ORAL ONCE
Status: CANCELLED
Start: 2018-01-17

## 2018-01-17 RX ADMIN — DIPHENHYDRAMINE HYDROCHLORIDE 50 MG: 25 CAPSULE ORAL at 13:09

## 2018-01-17 RX ADMIN — ACETAMINOPHEN 650 MG: 325 TABLET ORAL at 13:08

## 2018-01-17 RX ADMIN — HYDROCORTISONE SODIUM SUCCINATE 100 MG: 100 INJECTION, POWDER, FOR SOLUTION INTRAMUSCULAR; INTRAVENOUS at 13:10

## 2018-01-17 RX ADMIN — HUMAN IMMUNOGLOBULIN G 20 G: 20 LIQUID INTRAVENOUS at 13:37

## 2018-01-17 ASSESSMENT — ENCOUNTER SYMPTOMS
EYE WATERING: 0
DIFFICULTY URINATING: 0
EYE IRRITATION: 0
EYE REDNESS: 0
DOUBLE VISION: 1
HEMATURIA: 0
EYE PAIN: 0
DYSURIA: 0
FLANK PAIN: 0

## 2018-01-17 NOTE — PATIENT INSTRUCTIONS
Post Infusion Assessment:  Rheumatology Post Infusion: POST-INFUSION OF BIOLOGICAL MEDICATION:    Reviewed with patient.  Given biologic medication or medication hand-out. Inform patient if any fever, chills or signs of infection, new symptoms, abdominal pain, heart palpitations, shortness of breath, reaction, weakness, neurological changes, seek medical attention immediately and should not receive infusions. No live virus vaccines prior to or during treatment or up to 6 months post infusion. If the patient has an upcoming procedure or surgery, this should be discussed with the rheumatologist and surgeon or provider..

## 2018-01-17 NOTE — MR AVS SNAPSHOT
After Visit Summary   1/17/2018    Zahida Cespedes    MRN: [id number]           Patient Information     Date Of Birth          1940        Visit Information        Provider Department      1/17/2018 1:00 PM RH INFUSION CHAIR 2 [facility]fusion [facility]        Today's Diagnoses     Myasthenia gravis with exacerbation (H)    -  1      Care Instructions        Post Infusion Assessment:  Rheumatology Post Infusion: POST-INFUSION OF BIOLOGICAL MEDICATION:    Reviewed with patient.  Given biologic medication or medication hand-out. Inform patient if any fever, chills or signs of infection, new symptoms, abdominal pain, heart palpitations, shortness of breath, reaction, weakness, neurological changes, seek medical attention immediately and should not receive infusions. No live virus vaccines prior to or during treatment or up to 6 months post infusion. If the patient has an upcoming procedure or surgery, this should be discussed with the rheumatologist and surgeon or provider..                                Follow-ups after your visit        Your next 10 appointments already scheduled     Jan 18, 2018 12:30 PM CST   Level 3 with RH INFUSION CHAIR 3   [facility]fusion [facility] ([facility])    [facility]  [address]  [facility] [address]   [phone]            Jan 19, 2018 12:30 PM CST   Level 3 with RH INFUSION CHAIR 8   [facility] ([facility])    [facility]  [address]  [facility] [address]   [phone]            Jan 24, 2018 11:20 AM CST   (Arrive by 11:05 AM)   Return Myasthenia Gravis with Erick Fernandez MD   [facility] Neurology ([facility])    [address] [place] [address] [phone]              Who to contact     If you have questions or  need follow up information about today's clinic visit or your schedule please contact [facility] INFUSION SERVICES directly at [phone].  Normal or non-critical lab and imaging results will be communicated to you by Pheedohart, letter or phone within 4 business days after the clinic has received the results. If you do not hear from us within 7 days, please contact the clinic through Pheedohart or phone. If you have a critical or abnormal lab result, we will notify you by phone as soon as possible.  Submit refill requests through Planitax or call your pharmacy and they will forward the refill request to us. Please allow 3 business days for your refill to be completed.          Additional Information About Your Visit        PheedoharHutGrip Information     Planitax gives you secure access to your electronic health record. If you see a primary care provider, you can also send messages to your care team and make appointments. If you have questions, please call your primary care clinic.  If you do not have a primary care provider, please call [phone] and they will assist you.        Care EveryWhere ID     This is your Care EveryWhere ID. This could be used by other organizations to access your [place] medical records  [id number]        Your Vitals Were     Pulse Temperature                68 97.9  F (36.6  C)           Blood Pressure from Last 3 Encounters:   01/17/18 132/71   01/16/18 119/58   01/15/18 117/53    Weight from Last 3 Encounters:   01/15/18 52.6 kg (116 lb)   11/27/17 50.3 kg (111 lb)              Today, you had the following     No orders found for display       Primary Care Provider Fax #    Provider Not In System [phone]                Equal Access to Services     [facility] : Hadii liliana [place], wabibida luqdeloris, qaybta kaaljoi espinoza . So [facility] [phone].    ATENCIÓN: Si habla español, tiene a torres disposición servicios  ghanshyam de asistencia lingüística. Kody viera [phone].    We comply with applicable federal civil rights laws and [place] laws. We do not discriminate on the basis of race, color, national origin, age, disability, sex, sexual orientation, or gender identity.            Thank you!     Thank you for choosing [facility] INFUSION SERVICES  for your care. Our goal is always to provide you with excellent care. Hearing back from our patients is one way we can continue to improve our services. Please take a few minutes to complete the written survey that you may receive in the mail after your visit with us. Thank you!             Your Updated Medication List - Protect others around you: Learn how to safely use, store and throw away your medicines at www.disposemymeds.org.          This list is accurate as of: 1/17/18  2:06 PM.  Always use your most recent med list.                   Brand Name Dispense Instructions for use Diagnosis    alendronate 70 MG tablet    FOSAMAX     Take 70 mg by mouth once a week        ascorbic acid 1000 MG Tabs    vitamin C     Take 1 tablet by mouth daily        FISH OIL CONCENTRATE 300 MG Caps      Take 1,000 mg by mouth daily        ipratropium 0.06 % spray    ATROVENT     Spray 2 sprays in nostril as needed        Lutein 6 MG Caps      Take 1 capsule by mouth daily        MULTI-VITAMINS Tabs      Take 1 tablet by mouth daily        predniSONE 10 MG tablet    DELTASONE     Take 20 mg by mouth daily        pyridostigmine 60 MG tablet    MESTINON     Take 120 mg by mouth 4 times daily        RA CALCIUM 1250 MG tablet   Generic drug:  calcium carbonate      Take 1 tablet by mouth daily        sulfamethoxazole-trimethoprim 400-80 MG per tablet    BACTRIM/SEPTRA     Take 1 tablet by mouth every morning        VITAMIN B COMPLEX PO      Take 1 capsule by mouth daily        vitamin D3 2000 UNITS Caps      Take 2,000 Units by mouth daily

## 2018-01-18 ENCOUNTER — INFUSION THERAPY VISIT (OUTPATIENT)
Dept: INFUSION THERAPY | Facility: CLINIC | Age: 78
End: 2018-01-18
Attending: PSYCHIATRY & NEUROLOGY
Payer: MEDICARE

## 2018-01-18 VITALS
HEART RATE: 62 BPM | SYSTOLIC BLOOD PRESSURE: 121 MMHG | DIASTOLIC BLOOD PRESSURE: 66 MMHG | TEMPERATURE: 97 F | RESPIRATION RATE: 16 BRPM

## 2018-01-18 DIAGNOSIS — G70.01 MYASTHENIA GRAVIS WITH EXACERBATION (H): Primary | ICD-10-CM

## 2018-01-18 PROCEDURE — 96365 THER/PROPH/DIAG IV INF INIT: CPT

## 2018-01-18 PROCEDURE — A9270 NON-COVERED ITEM OR SERVICE: HCPCS | Mod: GY | Performed by: PSYCHIATRY & NEUROLOGY

## 2018-01-18 PROCEDURE — 25000132 ZZH RX MED GY IP 250 OP 250 PS 637: Mod: GY | Performed by: PSYCHIATRY & NEUROLOGY

## 2018-01-18 PROCEDURE — 96366 THER/PROPH/DIAG IV INF ADDON: CPT

## 2018-01-18 PROCEDURE — 96375 TX/PRO/DX INJ NEW DRUG ADDON: CPT

## 2018-01-18 PROCEDURE — 25000128 H RX IP 250 OP 636: Performed by: PSYCHIATRY & NEUROLOGY

## 2018-01-18 RX ORDER — DIPHENHYDRAMINE HCL 25 MG
50 CAPSULE ORAL ONCE
Status: CANCELLED | OUTPATIENT
Start: 2018-01-18

## 2018-01-18 RX ORDER — ACETAMINOPHEN 325 MG/1
650 TABLET ORAL ONCE
Status: COMPLETED | OUTPATIENT
Start: 2018-01-18 | End: 2018-01-18

## 2018-01-18 RX ORDER — ACETAMINOPHEN 325 MG/1
650 TABLET ORAL ONCE
Status: CANCELLED
Start: 2018-01-18

## 2018-01-18 RX ORDER — DIPHENHYDRAMINE HCL 25 MG
50 CAPSULE ORAL ONCE
Status: COMPLETED | OUTPATIENT
Start: 2018-01-18 | End: 2018-01-18

## 2018-01-18 RX ADMIN — HUMAN IMMUNOGLOBULIN G 20 G: 20 LIQUID INTRAVENOUS at 13:19

## 2018-01-18 RX ADMIN — HYDROCORTISONE SODIUM SUCCINATE 100 MG: 100 INJECTION, POWDER, FOR SOLUTION INTRAMUSCULAR; INTRAVENOUS at 12:49

## 2018-01-18 RX ADMIN — DIPHENHYDRAMINE HYDROCHLORIDE 50 MG: 25 CAPSULE ORAL at 12:49

## 2018-01-18 RX ADMIN — ACETAMINOPHEN 650 MG: 325 TABLET ORAL at 12:50

## 2018-01-18 NOTE — MR AVS SNAPSHOT
After Visit Summary   1/18/2018    Zahida Cespedes    MRN: [id number]           Patient Information     Date Of Birth          1940        Visit Information        Provider Department      1/18/2018 12:30 PM RH INFUSION CHAIR 3 [facility] Infusion Services        Today's Diagnoses     Myasthenia gravis with exacerbation (H)    -  1       Follow-ups after your visit        Your next 10 appointments already scheduled     Jan 19, 2018 12:30 PM CST   Level 3 with RH INFUSION CHAIR 8   [facility] Infusion Services ([facility])    [facility]  [address]  [facility] [address]   [phone]            Jan 24, 2018 11:20 AM CST   (Arrive by 11:05 AM)   Return Myasthenia Gravis with Erick Fernandez MD   [facility] Neurology ([facility])    [address] [address] [phone]              Who to contact     If you have questions or need follow up information about today's clinic visit or your schedule please contact [facility] INFUSION SERVICES directly at [phone].  Normal or non-critical lab and imaging results will be communicated to you by MyChart, letter or phone within 4 business days after the clinic has received the results. If you do not hear from us within 7 days, please contact the clinic through MyChart or phone. If you have a critical or abnormal lab result, we will notify you by phone as soon as possible.  Submit refill requests through PushPage or call your pharmacy and they will forward the refill request to us. Please allow 3 business days for your refill to be completed.          Additional Information About Your Visit        MyChart Information     PushPage gives you secure access to your electronic health record. If you see a primary care provider, you can also send messages to your care team and make  appointments. If you have questions, please call your primary care clinic.  If you do not have a primary care provider, please call [phone] and they will assist you.        Care EveryWhere ID     This is your Care EveryWhere ID. This could be used by other organizations to access your [place] medical records  [id number]        Your Vitals Were     Pulse Temperature Respirations             62 97  F (36.1  C) (Tympanic) 16          Blood Pressure from Last 3 Encounters:   01/18/18 121/66   01/17/18 132/71   01/16/18 119/58    Weight from Last 3 Encounters:   01/15/18 52.6 kg (116 lb)   11/27/17 50.3 kg (111 lb)              Today, you had the following     No orders found for display       Primary Care Provider Fax #    Provider Not In System [phone]                Equal Access to Services     [facility] : Hadii liliana [place], waasuncion levinqdeloris, kaleigh kaalmadianne guajardo, joi meehan . So [facility] [phone].    ATENCIÓN: Si habla español, tiene a torres disposición servicios gratuitos de asistencia lingüística. Kody al [phone].    We comply with applicable federal civil rights laws and [place] laws. We do not discriminate on the basis of race, color, national origin, age, disability, sex, sexual orientation, or gender identity.            Thank you!     Thank you for choosing [facility] INFUSION SERVICES  for your care. Our goal is always to provide you with excellent care. Hearing back from our patients is one way we can continue to improve our services. Please take a few minutes to complete the written survey that you may receive in the mail after your visit with us. Thank you!             Your Updated Medication List - Protect others around you: Learn how to safely use, store and throw away your medicines at www.disposemymeds.org.          This list is accurate as of: 1/18/18  3:18 PM.  Always use your most recent med list.                    Brand Name Dispense Instructions for use Diagnosis    alendronate 70 MG tablet    FOSAMAX     Take 70 mg by mouth once a week        ascorbic acid 1000 MG Tabs    vitamin C     Take 1 tablet by mouth daily        FISH OIL CONCENTRATE 300 MG Caps      Take 1,000 mg by mouth daily        ipratropium 0.06 % spray    ATROVENT     Spray 2 sprays in nostril as needed        Lutein 6 MG Caps      Take 1 capsule by mouth daily        MULTI-VITAMINS Tabs      Take 1 tablet by mouth daily        predniSONE 10 MG tablet    DELTASONE     Take 20 mg by mouth daily        pyridostigmine 60 MG tablet    MESTINON     Take 120 mg by mouth 4 times daily        RA CALCIUM 1250 MG tablet   Generic drug:  calcium carbonate      Take 1 tablet by mouth daily        sulfamethoxazole-trimethoprim 400-80 MG per tablet    BACTRIM/SEPTRA     Take 1 tablet by mouth every morning        VITAMIN B COMPLEX PO      Take 1 capsule by mouth daily        vitamin D3 2000 UNITS Caps      Take 2,000 Units by mouth daily

## 2018-01-18 NOTE — PROGRESS NOTES
"Infusion Nursing Note:  Zahida Cespedes presents today for IVIG/premeds.    Patient seen by provider today: No   present during visit today: Not Applicable.    Note: denies any side effects, and does state her vision has been straightening out for short periods of time.    Intravenous Access:  Peripheral IV placed.    Treatment Conditions:  Rheumatology Infusion Checklist: PRIOR TO INFUSION OF BIOLOGICAL MEDICATIONS OR ANY OF THESE AS LISTED: Immune Globulin (IVIG) \".rheumbiologicalchecklist\"    Prior to Infusion of biological medications or any of these as listed:    1. Elevated temperature, fever, chills, productive cough or abnormal vital signs, night sweats, coughing up blood or sputum, no appetite or abnormal vital signs : NO    2. Open wounds or new incisions: NO    3. Recent hospitalization: NO    4.  Recent surgeries:  NO    5. Any upcoming surgeries or dental procedures?:NO    6. Any current or recent bouts of illness or infection? On any antibiotics? : NO    7. Any new, sudden or worsening abdominal pain :NO    8. Vaccination within 4 weeks? Patient or someone in the household is scheduled to receive vaccination? No live virus vaccines prior to or during treatment :NO    9. Any nervous system diseases [i.e. multiple sclerosis, Guillain-[place], seizures, neurological  changes]: NO    10. Pregnant or breast feeding; or plans on pregnancy in the future: NO    11. Signs of worsening depression or suicidal ideations while taking benlysta:NO    12. New-onset medical symptoms: NO    13.  New cancer diagnosis or on chemotherapy or radiation NO    14.  Evaluate for any sign of active TB [Unexplained weight loss, Loss of appetite, Night sweats, Fever, Fatigue, Chills, Coughing for 3 weeks or longer, Hemoptysis (coughing up blood), Chest pain]: NO    Patient here for IVIG       Privigen 20 g    IVIG started at 0.5 cc/kg/hr. Increased by 0.5cc/kg/hr every 15 minutes for max rate of 4 cc/kg/hr.    Post " Infusion Assessment:  Patient tolerated infusion without incident.  Blood return noted pre and post infusion.  Site patent and intact, free from redness, edema or discomfort.  Access discontinued per protocol.  Rheumatology Post Infusion: POST-INFUSION OF BIOLOGICAL MEDICATION:    Reviewed with patient.  Given biologic medication or medication hand-out. Inform patient if any fever, chills or signs of infection, new symptoms, abdominal pain, heart palpitations, shortness of breath, reaction, weakness, neurological changes, seek medical attention immediately and should not receive infusions. No live virus vaccines prior to or during treatment or up to 6 months post infusion. If the patient has an upcoming procedure or surgery, this should be discussed with the rheumatologist and surgeon or provider..      Discharge Plan:   Discharge instructions reviewed with: Patient.  Patient and/or family verbalized understanding of discharge instructions and all questions answered.  Copy of AVS reviewed with patient and/or family.  Patient will return 1/19/18 for next appointment.  Patient discharged in stable condition accompanied by: self.  Departure Mode: Ambulatory.    Mansi Price RN                  \

## 2018-01-19 ENCOUNTER — INFUSION THERAPY VISIT (OUTPATIENT)
Dept: INFUSION THERAPY | Facility: CLINIC | Age: 78
End: 2018-01-19
Attending: PSYCHIATRY & NEUROLOGY
Payer: MEDICARE

## 2018-01-19 VITALS
RESPIRATION RATE: 16 BRPM | OXYGEN SATURATION: 97 % | TEMPERATURE: 98.6 F | HEART RATE: 63 BPM | SYSTOLIC BLOOD PRESSURE: 136 MMHG | DIASTOLIC BLOOD PRESSURE: 69 MMHG

## 2018-01-19 DIAGNOSIS — G70.01 MYASTHENIA GRAVIS WITH EXACERBATION (H): Primary | ICD-10-CM

## 2018-01-19 PROCEDURE — 96375 TX/PRO/DX INJ NEW DRUG ADDON: CPT

## 2018-01-19 PROCEDURE — 96365 THER/PROPH/DIAG IV INF INIT: CPT

## 2018-01-19 PROCEDURE — 96366 THER/PROPH/DIAG IV INF ADDON: CPT

## 2018-01-19 PROCEDURE — 25000128 H RX IP 250 OP 636: Performed by: PSYCHIATRY & NEUROLOGY

## 2018-01-19 PROCEDURE — 25000132 ZZH RX MED GY IP 250 OP 250 PS 637: Mod: GY | Performed by: PSYCHIATRY & NEUROLOGY

## 2018-01-19 PROCEDURE — A9270 NON-COVERED ITEM OR SERVICE: HCPCS | Mod: GY | Performed by: PSYCHIATRY & NEUROLOGY

## 2018-01-19 RX ORDER — ACETAMINOPHEN 325 MG/1
650 TABLET ORAL ONCE
Status: COMPLETED | OUTPATIENT
Start: 2018-01-19 | End: 2018-01-19

## 2018-01-19 RX ORDER — DIPHENHYDRAMINE HCL 25 MG
50 CAPSULE ORAL ONCE
Status: COMPLETED | OUTPATIENT
Start: 2018-01-19 | End: 2018-01-19

## 2018-01-19 RX ORDER — ACETAMINOPHEN 325 MG/1
650 TABLET ORAL ONCE
Status: CANCELLED
Start: 2018-01-19

## 2018-01-19 RX ORDER — DIPHENHYDRAMINE HCL 25 MG
50 CAPSULE ORAL ONCE
Status: CANCELLED | OUTPATIENT
Start: 2018-01-19

## 2018-01-19 RX ADMIN — ACETAMINOPHEN 650 MG: 325 TABLET ORAL at 12:44

## 2018-01-19 RX ADMIN — HYDROCORTISONE SODIUM SUCCINATE 100 MG: 100 INJECTION, POWDER, FOR SOLUTION INTRAMUSCULAR; INTRAVENOUS at 12:45

## 2018-01-19 RX ADMIN — HUMAN IMMUNOGLOBULIN G 20 G: 20 LIQUID INTRAVENOUS at 13:11

## 2018-01-19 RX ADMIN — DIPHENHYDRAMINE HYDROCHLORIDE 50 MG: 25 CAPSULE ORAL at 12:44

## 2018-01-19 ASSESSMENT — PAIN SCALES - GENERAL: PAINLEVEL: NO PAIN (0)

## 2018-01-19 NOTE — MR AVS SNAPSHOT
After Visit Summary   1/19/2018    Zahida Cespedes    MRN: [id number]           Patient Information     Date Of Birth          1940        Visit Information        Provider Department      1/19/2018 12:30 PM RH INFUSION CHAIR 8 [facility] Infusion Services        Today's Diagnoses     Myasthenia gravis with exacerbation (H)    -  1       Follow-ups after your visit        Your next 10 appointments already scheduled     Jan 24, 2018 11:20 AM CST   (Arrive by 11:05 AM)   Return Myasthenia Gravis with Erick Fernandez MD   [facility] Neurology ([facility] [place])    [address] [place] [address]  [address] [facility] [address] [phone]              Who to contact     If you have questions or need follow up information about today's clinic visit or your schedule please contact [facility] INFUSION SERVICES directly at [phone].  Normal or non-critical lab and imaging results will be communicated to you by MyChart, letter or phone within 4 business days after the clinic has received the results. If you do not hear from us within 7 days, please contact the clinic through iHandlehart or phone. If you have a critical or abnormal lab result, we will notify you by phone as soon as possible.  Submit refill requests through Axtria or call your pharmacy and they will forward the refill request to us. Please allow 3 business days for your refill to be completed.          Additional Information About Your Visit        MyChart Information     Axtria gives you secure access to your electronic health record. If you see a primary care provider, you can also send messages to your care team and make appointments. If you have questions, please call your primary care clinic.  If you do not have a primary care provider, please call [phone] and they will assist you.        Care EveryWhere ID     This is your Care EveryWhere ID. This could be used by  other organizations to access your [place] medical records  [id number]        Your Vitals Were     Pulse Temperature Respirations Pulse Oximetry          63 98.6  F (37  C) (Tympanic) 16 97%         Blood Pressure from Last 3 Encounters:   01/19/18 136/69   01/18/18 121/66   01/17/18 132/71    Weight from Last 3 Encounters:   01/15/18 52.6 kg (116 lb)   11/27/17 50.3 kg (111 lb)              Today, you had the following     No orders found for display       Primary Care Provider Fax #    Provider Not In System [phone]                Equal Access to Services     [facility]: Hadii liliana barr Sofamilia, waaxda ollieqadaha, zulayybjohnathan kaalmadianne guajardo, joi meehan . So [facility] [phone].    ATENCIÓN: Si habla español, tiene a torres disposición servicios gratuitos de asistencia lingüística. Llame al [phone].    We comply with applicable federal civil rights laws and [place] laws. We do not discriminate on the basis of race, color, national origin, age, disability, sex, sexual orientation, or gender identity.            Thank you!     Thank you for choosing [facility] INFUSION SERVICES  for your care. Our goal is always to provide you with excellent care. Hearing back from our patients is one way we can continue to improve our services. Please take a few minutes to complete the written survey that you may receive in the mail after your visit with us. Thank you!             Your Updated Medication List - Protect others around you: Learn how to safely use, store and throw away your medicines at www.disposemymeds.org.          This list is accurate as of: 1/19/18  3:20 PM.  Always use your most recent med list.                   Brand Name Dispense Instructions for use Diagnosis    alendronate 70 MG tablet    FOSAMAX     Take 70 mg by mouth once a week        ascorbic acid 1000 MG Tabs    vitamin C     Take 1 tablet by mouth daily        FISH OIL CONCENTRATE 300 MG  Caps      Take 1,000 mg by mouth daily        ipratropium 0.06 % spray    ATROVENT     Spray 2 sprays in nostril as needed        Lutein 6 MG Caps      Take 1 capsule by mouth daily        MULTI-VITAMINS Tabs      Take 1 tablet by mouth daily        predniSONE 10 MG tablet    DELTASONE     Take 20 mg by mouth daily        pyridostigmine 60 MG tablet    MESTINON     Take 120 mg by mouth 4 times daily        RA CALCIUM 1250 MG tablet   Generic drug:  calcium carbonate      Take 1 tablet by mouth daily        sulfamethoxazole-trimethoprim 400-80 MG per tablet    BACTRIM/SEPTRA     Take 1 tablet by mouth every morning        VITAMIN B COMPLEX PO      Take 1 capsule by mouth daily        vitamin D3 2000 UNITS Caps      Take 2,000 Units by mouth daily

## 2018-01-19 NOTE — PROGRESS NOTES
"Infusion Nursing Note:  Zahida Cespedes presents today for #5 of 5 IVIG.    Patient seen by provider today: No   present during visit today: Not Applicable.    Note: Feels like double vision has improved since starting IVIG.  Started at 0.5ml/kg/hr and increased by 0.5ml/kg/hr every 15min. for a max dose of 4.0ml/kg/hr.    Intravenous Access:  Peripheral IV placed.    Treatment Conditions:  Rheumatology Infusion Checklist: PRIOR TO INFUSION OF BIOLOGICAL MEDICATIONS OR ANY OF THESE AS LISTED: Immune Globulin (IVIG) \".rheumbiologicalchecklist\"    Prior to Infusion of biological medications or any of these as listed:    1. Elevated temperature, fever, chills, productive cough or abnormal vital signs, night sweats, coughing up blood or sputum, no appetite or abnormal vital signs : NO    2. Open wounds or new incisions: NO    3. Recent hospitalization: NO    4.  Recent surgeries:  NO    5. Any upcoming surgeries or dental procedures?:NO    6. Any current or recent bouts of illness or infection? On any antibiotics? : YES on Bactrim while on steroid.    7. Any new, sudden or worsening abdominal pain :NO    8. Vaccination within 4 weeks? Patient or someone in the household is scheduled to receive vaccination? No live virus vaccines prior to or during treatment :NO    9. Any nervous system diseases [i.e. multiple sclerosis, Guillain-[place], seizures, neurological  changes]: YES myasthenia gravis    10. Pregnant or breast feeding; or plans on pregnancy in the future: NO    11. Signs of worsening depression or suicidal ideations while taking benlysta:NO    12. New-onset medical symptoms: NO    13.  New cancer diagnosis or on chemotherapy or radiation NO    14.  Evaluate for any sign of active TB [Unexplained weight loss, Loss of appetite, Night sweats, Fever, Fatigue, Chills, Coughing for 3 weeks or longer, Hemoptysis (coughing up blood), Chest pain]: NO    **Note: If answered yes to any of the above, hold the " infusion and contact ordering rheumatologist or on-call rheumatologist.   .        Post Infusion Assessment:  Patient tolerated infusion without incident.  Blood return noted pre and post infusion.  Site patent and intact, free from redness, edema or discomfort.  No evidence of extravasations.  Access discontinued per protocol.    Discharge Plan:   Discharge instructions reviewed with: Patient.  Patient discharged in stable condition accompanied by: self.  Departure Mode: Ambulatory.    NEDA BLOUNT RN

## 2018-01-24 ENCOUNTER — OFFICE VISIT (OUTPATIENT)
Dept: NEUROLOGY | Facility: CLINIC | Age: 78
End: 2018-01-24
Payer: COMMERCIAL

## 2018-01-24 ENCOUNTER — TELEPHONE (OUTPATIENT)
Dept: NEUROLOGY | Facility: CLINIC | Age: 78
End: 2018-01-24

## 2018-01-24 VITALS
WEIGHT: 115 LBS | SYSTOLIC BLOOD PRESSURE: 143 MMHG | BODY MASS INDEX: 18.48 KG/M2 | DIASTOLIC BLOOD PRESSURE: 73 MMHG | HEIGHT: 66 IN | HEART RATE: 79 BPM

## 2018-01-24 DIAGNOSIS — Z79.60 LONG-TERM USE OF IMMUNOSUPPRESSANT MEDICATION: ICD-10-CM

## 2018-01-24 DIAGNOSIS — G70.00 MG, OCULAR (MYASTHENIA GRAVIS) (H): Primary | ICD-10-CM

## 2018-01-24 RX ORDER — MYCOPHENOLATE MOFETIL 500 MG/1
TABLET ORAL
Qty: 120 TABLET | Refills: 1 | Status: SHIPPED | OUTPATIENT
Start: 2018-01-24 | End: 2018-01-25

## 2018-01-24 RX ORDER — PREDNISONE 10 MG/1
TABLET ORAL
Qty: 60 TABLET | Refills: 1 | Status: SHIPPED | OUTPATIENT
Start: 2018-01-24 | End: 2018-07-20

## 2018-01-24 ASSESSMENT — PAIN SCALES - GENERAL: PAINLEVEL: NO PAIN (0)

## 2018-01-24 NOTE — MR AVS SNAPSHOT
After Visit Summary   1/24/2018    Zahida Cespedes    MRN: [id number]           Patient Information     Date Of Birth          1940        Visit Information        Provider Department      1/24/2018 11:20 AM Erick Fernandez MD [facility] Neurology        Today's Diagnoses     MG, ocular (myasthenia gravis) (H)    -  1    Long-term use of immunosuppressant medication          Care Instructions    Reduce your prednisone dose as instructed in your prescription.  You can stop the Bactrim when your prednisone dose is down to 20 mg every other day.    Start mycophenolate as instructed in your prescription. Cellcept can cause some diarrhea, low white cell counts, and low platelet counts, and rarely liver dysfunction. Those need to be monitored, and that is why we ordered blood tests to be done once a month for the next 3 months. You should start doing the blood tests in a month from now.    Mycophenolate can increase your risk of infection, therefore if you have any signs suggestive of infection (fever, malaise, chills, feeling sick), please contact your primary care doctor without delay.    We will continue the IVIG every 3 weeks. You will have to undergo the infusion on two consecutive days, and then repeat every 3 weeks until you return to Clinic in 5/2018.    Please call us at  if you have any questions or concerns.            Follow-ups after your visit        Your next 10 appointments already scheduled     Jun 04, 2018  2:20 PM CDT   (Arrive by 2:05 PM)   Return Myasthenia Gravis with Erick Fernandez MD   [facility] Neurology ([facility])    [address] [place] [id number] [phone]              Future tests that were ordered for you today     Open Standing Orders        Priority Remaining Interval Expires Ordered    CBC with platelets differential Routine 3/3 monthly 5/24/2018 1/24/2018    AST Routine 3/3  "monthly 5/24/2018 1/24/2018    ALT Routine 3/3 monthly 5/24/2018 1/24/2018            Who to contact     Please call your clinic at [phone] to:    Ask questions about your health    Make or cancel appointments    Discuss your medicines    Learn about your test results    Speak to your doctor   If you have compliments or concerns about an experience at your clinic, or if you wish to file a complaint, please contact [facility] Patient Relations at [phone] or email us at [email or web address]         Additional Information About Your Visit        [employer] Information     [employer] gives you secure access to your electronic health record. If you see a primary care provider, you can also send messages to your care team and make appointments. If you have questions, please call your primary care clinic.  If you do not have a primary care provider, please call [phone] and they will assist you.      [employer] is an electronic gateway that provides easy, online access to your medical records. With [employer], you can request a clinic appointment, read your test results, renew a prescription or communicate with your care team.     To access your existing account, please contact your [facility] Clinic or call [phone] for assistance.        Care EveryWhere ID     This is your Care EveryWhere ID. This could be used by other organizations to access your [facility] medical records  [id number]        Your Vitals Were     Pulse Height BMI (Body Mass Index)             79 1.676 m (5' 6\") 18.56 kg/m2          Blood Pressure from Last 3 Encounters:   01/24/18 143/73   01/19/18 136/69   01/18/18 121/66    Weight from Last 3 Encounters:   01/24/18 52.2 kg (115 lb)   01/15/18 52.6 kg (116 lb)   11/27/17 50.3 kg (111 lb)                 Today's Medication Changes          These changes are accurate as of 1/24/18 11:49 AM.  If you have any questions, ask your nurse or " doctor.               Start taking these medicines.        Dose/Directions    mycophenolate 500 MG tablet   Commonly known as:  GENERIC EQUIVALENT   Used for:  MG, ocular (myasthenia gravis) (H)   Started by:  Erick Fernandez MD        Take 1 tablet by mouth twice a day for 2 weeks, then 1 morning and 2 evening for 2 weeks, then 2 tablets twice a day   Quantity:  120 tablet   Refills:  1         These medicines have changed or have updated prescriptions.        Dose/Directions    predniSONE 10 MG tablet   Commonly known as:  DELTASONE   This may have changed:    - how much to take  - how to take this  - when to take this  - additional instructions   Used for:  MG, ocular (myasthenia gravis) (H)   Changed by:  Erick Fernandez MD        Take 2 tablets alternating with 1 and a half tablets daily for 1 month, then 2 alternating with 1 for 1 month, then 2 alternating with half for 1 month, then 2 every other day   Quantity:  60 tablet   Refills:  1            Where to get your medicines      These medications were sent to [id number] [place] [id number]     Phone:  [phone]     mycophenolate 500 MG tablet         Some of these will need a paper prescription and others can be bought over the counter.  Ask your nurse if you have questions.     Bring a paper prescription for each of these medications     predniSONE 10 MG tablet                Primary Care Provider Fax #    Provider Not In System [phone]                Equal Access to Services     LISA FONTAINE : Hadii liliana [place], albert chavez, qaybta joi shah . [facility] [phone].    ATENCIÓN: Si habla español, tiene a torres disposición servicios gratuitos de asistencia lingüística. Kody al [phone].    We comply with applicable federal civil rights laws and [place] laws. We do not discriminate  on the basis of race, color, national origin, age, disability, sex, sexual orientation, or gender identity.            Thank you!     Thank you for choosing [facility]UROLOG[facility]  for your care. Our goal is always to provide you with excellent care. Hearing back from our patients is one way we can continue to improve our services. Please take a few minutes to complete the written survey that you may receive in the mail after your visit with us. Thank you!             Your Updated Medication List - Protect others around you: Learn how to safely use, store and throw away your medicines at www.disposemymeds.org.          This list is accurate as of 1/24/18 11:49 AM.  Always use your most recent med list.                   Brand Name Dispense Instructions for use Diagnosis    alendronate 70 MG tablet    FOSAMAX     Take 70 mg by mouth once a week        ascorbic acid 1000 MG Tabs    vitamin C     Take 1 tablet by mouth daily        FISH OIL CONCENTRATE 300 MG Caps      Take 1,000 mg by mouth daily        ipratropium 0.06 % spray    ATROVENT     Spray 2 sprays in nostril as needed        Lutein 6 MG Caps      Take 1 capsule by mouth daily        MULTI-VITAMINS Tabs      Take 1 tablet by mouth daily        mycophenolate 500 MG tablet    GENERIC EQUIVALENT    120 tablet    Take 1 tablet by mouth twice a day for 2 weeks, then 1 morning and 2 evening for 2 weeks, then 2 tablets twice a day    MG, ocular (myasthenia gravis) (H)       predniSONE 10 MG tablet    DELTASONE    60 tablet    Take 2 tablets alternating with 1 and a half tablets daily for 1 month, then 2 alternating with 1 for 1 month, then 2 alternating with half for 1 month, then 2 every other day    MG, ocular (myasthenia gravis) (H)       pyridostigmine 60 MG tablet    MESTINON     Take 120 mg by mouth 4 times daily        RA CALCIUM 1250 MG tablet   Generic drug:  calcium carbonate      Take 1 tablet by mouth daily        sulfamethoxazole-trimethoprim 400-80 MG per  tablet    BACTRIM/SEPTRA     Take 1 tablet by mouth every morning        VITAMIN B COMPLEX PO      Take 1 capsule by mouth daily        vitamin D3 2000 UNITS Caps      Take 2,000 Units by mouth daily

## 2018-01-24 NOTE — TELEPHONE ENCOUNTER
Patient was seen in clinic today and Dr. Fernandez ordered 1 g/kg given over 2 consecutive days every 3 weeks for another 3-4 months. Therapy plan faxed to [facility] (fax: [phone]) per patient request.

## 2018-01-24 NOTE — LETTER
2018       RE: Zahida Cespedes  [address] [facility] [address]     Dear Colleague,    Thank you for referring your patient, Zahida Cespedes, to the [facility] NEUROLOG[facility] at [facility]. Please see a copy of my visit note below.    Service Date: 2018         RE: Zahida Cespedes   MRN: [id number]   : 1940      Dear Doctors:      I had the pleasure to see Zahida Cespedes in followup at the [facility] today.  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  She was treated at [facility] with Mestinon up to 120 mg 4 times a day and prednisone that was escalated to 50 mg daily and then gradually tapered.  Even with this combination, however, she did not have an optimal result and she continued to have double vision and ptosis.  I prescribed IVIG for her.  She underwent infusions last week-0.4 g/kg daily x5.  She feels much better now 4 days after her last infusion.  Her eyes are much more open, and her diplopia at near, has almost disappeared.  She still has some difficulty focusing at far.  She continues to have no new symptoms to suggest generalized myasthenia such as dysphagia, dysarthria, dysphonia, sialorrhea, difficulty chewing, head drop, dyspnea proximal arm or leg weakness, etc.  She tolerated the IVIG infusions quite well.  She is on 20 mg of prednisone daily at this point.        CURRENT MEDICATIONS:   Reviewed and are as per Epic record.      PHYSICAL EXAMINATION:     VITAL SIGNS:   Her blood pressure was 143/73.  Pulse 79 and regular.  Weight 52.2 kilos.  Height is 167.  She endorses no pain.   NEUROLOGIC:  She is awake, alert, oriented x3.  Visual fields are full to confrontation bilaterally.  The ocular exam, however, is not substantially changed from the last time I met with her.  She has not taken any Mestinon since 7:00 this  morning, so I examined her 4 hours later.  She has ptosis of the right eyelid that is fatigable, worse with sustained upward gaze.  No ptosis of the left lid.  She has mild weakness of right orbicularis oculi, none on the left.  She continues to have vertical misalignment of the eyes with a left eye hypertropia or right hypotropia.  On cover test, the left eye moves inward and down, the right eye moves upward and in.  She has diplopia when gazing to the right.  She does not have any weakness of orbicularis oris, smile, uvula, jaw, palate or tongue.  Facial sensation is intact in all distributions of trigeminal nerve.  Neck flexion and extension strength is 5.  Strength is 5/5 in all muscle groups of the upper and lower extremities proximally and distally.  There is no dysphonia or dysarthria.      In summary, Ms. Cespedes has ocular myasthenia.  While her physical examination has not remarkably changed from the last time I saw her, she feels that the IVIG has clearly helped her, and along those lines we will continue it.  I will renew her infusions at 1 g/kg every 3 weeks for the next 3-4 months.  The dose of 1 gram per kilogram will be split into 2 doses of 0.5 gram per kilogram given on consecutive days.  This has less chance of headaches or side effects in my opinion.  I will also start her on CellCept with the intent to ultimately taper the IVIG dose and frequency.  We will start at 500 mg b.i.d. and gradually increase the dose to 1 gram b.i.d. as tolerated.  Side effects of CellCept including diarrhea, leukopenia, thrombocytopenia and mild risk of liver dysfunction were explained to the patient.  She also understands that this is an immunosuppressant drug and she should notify her primary care doctor promptly if she develops any signs suggestive of infection.  We will check CBC, AST and ALT monthly for the next 3 months while on CellCept.  I asked her to taper her prednisone dose very slowly, 20 mg alternating  with 15 mg daily for 1 month, then 20 alternating with 10 for a month, 20 alternating with 5 for a tasia, and ultimately 20 mg every other day.  I think she can stop Bactrim when she reaches that dose. I will see her in followup in clinic in 4 months or earlier if necessary.       Sincerely,       Erick Fernandez MD           D: 2018   T: 2018   MT: AKA      Name:     TACOS LARES   MRN:      [id number]        Account:      [id number]   :      1940           Service Date: 2018      Document: [id number]        Again, thank you for allowing me to participate in the care of your patient.      Sincerely,    Erick Fernandez MD    cc:   Liv Buchanan MD   [facility]    [address] [place] [address]     Micheline Lopez MD   [facility]    [address] [place] [address]

## 2018-01-24 NOTE — PATIENT INSTRUCTIONS
Reduce your prednisone dose as instructed in your prescription.  You can stop the Bactrim when your prednisone dose is down to 20 mg every other day.    Start mycophenolate as instructed in your prescription. Cellcept can cause some diarrhea, low white cell counts, and low platelet counts, and rarely liver dysfunction. Those need to be monitored, and that is why we ordered blood tests to be done once a month for the next 3 months. You should start doing the blood tests in a month from now.    Mycophenolate can increase your risk of infection, therefore if you have any signs suggestive of infection (fever, malaise, chills, feeling sick), please contact your primary care doctor without delay.    We will continue the IVIG every 3 weeks. You will have to undergo the infusion on two consecutive days, and then repeat every 3 weeks until you return to Clinic in 5/2018.    Please call us at  if you have any questions or concerns.

## 2018-01-24 NOTE — PROGRESS NOTES
Service Date: 2018      Micheline Lopez MD   [address] [place] [address]      RE: Zahida Cespedes   MRN: [id number]   : 1940      Dear Doctors:      I had the pleasure to see Zahida Cespedes in followup at the [facility] today.  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  She was treated at [facility] with Mestinon up to 120 mg 4 times a day and prednisone that was escalated to 50 mg daily and then gradually tapered.  Even with this combination, however, she did not have an optimal result and she continued to have double vision and ptosis.  I prescribed IVIG for her.  She underwent infusions last week-0.4 g/kg daily x5.  She feels much better now 4 days after her last infusion.  Her eyes are much more open, and her diplopia at near, has almost disappeared.  She still has some difficulty focusing at far.  She continues to have no new symptoms to suggest generalized myasthenia such as dysphagia, dysarthria, dysphonia, sialorrhea, difficulty chewing, head drop, dyspnea proximal arm or leg weakness, etc.  She tolerated the IVIG infusions quite well.  She is on 20 mg of prednisone daily at this point.        CURRENT MEDICATIONS:   Reviewed and are as per Epic record.      PHYSICAL EXAMINATION:     VITAL SIGNS:   Her blood pressure was 143/73.  Pulse 79 and regular.  Weight 52.2 kilos.  Height is 167.  She endorses no pain.   NEUROLOGIC:  She is awake, alert, oriented x3.  Visual fields are full to confrontation bilaterally.  The ocular exam, however, is not substantially changed from the last time I met with her.  She has not taken any Mestinon since 7:00 this morning, so I examined her 4 hours later.  She has ptosis of the right eyelid that is fatigable, worse with sustained upward gaze.  No ptosis of the left lid.  She has mild weakness of right orbicularis oculi,  none on the left.  She continues to have vertical misalignment of the eyes with a left eye hypertropia or right hypotropia.  On cover test, the left eye moves inward and down, the right eye moves upward and in.  She has diplopia when gazing to the right.  She does not have any weakness of orbicularis oris, smile, uvula, jaw, palate or tongue.  Facial sensation is intact in all distributions of trigeminal nerve.  Neck flexion and extension strength is 5.  Strength is 5/5 in all muscle groups of the upper and lower extremities proximally and distally.  There is no dysphonia or dysarthria.      In summary, Ms. Cespedes has ocular myasthenia.  While her physical examination has not remarkably changed from the last time I saw her, she feels that the IVIG has clearly helped her, and along those lines we will continue it.  I will renew her infusions at 1 g/kg every 3 weeks for the next 3-4 months.  The dose of 1 gram per kilogram will be split into 2 doses of 0.5 gram per kilogram given on consecutive days.  This has less chance of headaches or side effects in my opinion.  I will also start her on CellCept with the intent to ultimately taper the IVIG dose and frequency.  We will start at 500 mg b.i.d. and gradually increase the dose to 1 gram b.i.d. as tolerated.  Side effects of CellCept including diarrhea, leukopenia, thrombocytopenia and mild risk of liver dysfunction were explained to the patient.  She also understands that this is an immunosuppressant drug and she should notify her primary care doctor promptly if she develops any signs suggestive of infection.  We will check CBC, AST and ALT monthly for the next 3 months while on CellCept.  I asked her to taper her prednisone dose very slowly, 20 mg alternating with 15 mg daily for 1 month, then 20 alternating with 10 for a month, 20 alternating with 5 for a tasia, and ultimately 20 mg every other day.  I think she can stop Bactrim when she reaches that dose. I will  see her in followup in clinic in 4 months or earlier if necessary.       Sincerely,       Juan David Ivy MD      cc:   Liv Buchanan MD   [address] [address]         JUAN DAVID IVY MD             D: 2018   T: 2018   MT: AKA      Name:     TACOS LARES   MRN:      [id number]        Account:      [id number]   :      1940           Service Date: 2018      Document: [id number]

## 2018-02-01 ENCOUNTER — TELEPHONE (OUTPATIENT)
Dept: NEUROLOGY | Facility: CLINIC | Age: 78
End: 2018-02-01

## 2018-02-01 NOTE — TELEPHONE ENCOUNTER
Left a voicemail for the patient asking what pharmacy the prescription is at so I can call and get more info.

## 2018-02-01 NOTE — TELEPHONE ENCOUNTER
Prior Authorization Retail Medication Request  Medication/Dose: mycophenolate 500 mg  Diagnosis and ICD code: Myasthenia Gravis, ocular G70.00  New/Renewal/Insurance Change PA: New  Previously Tried and Failed Therapies: prednisone    Insurance ID (if provided): oscar  Insurance Phone (if provided): oscar    Any additional info from fax request:     If you received a fax notification from an outside Pharmacy:  [facility] Name:[facility]  [facility]armacy #:[id number]  Pharmacy Fax:na

## 2018-02-02 NOTE — TELEPHONE ENCOUNTER
Patient returned call today. She said to cancel the P/A request because she bought the medication at [facility] for approx $37.00.

## 2018-03-02 DIAGNOSIS — G70.01 MYASTHENIA GRAVIS WITH EXACERBATION (H): Primary | ICD-10-CM

## 2018-03-02 RX ORDER — PYRIDOSTIGMINE BROMIDE 60 MG/1
120 TABLET ORAL 4 TIMES DAILY
Qty: 90 TABLET | Refills: 0 | Status: SHIPPED | OUTPATIENT
Start: 2018-03-02 | End: 2018-08-20

## 2018-04-06 ENCOUNTER — TRANSFERRED RECORDS (OUTPATIENT)
Dept: HEALTH INFORMATION MANAGEMENT | Facility: CLINIC | Age: 78
End: 2018-04-06

## 2018-05-07 ENCOUNTER — TRANSFERRED RECORDS (OUTPATIENT)
Dept: HEALTH INFORMATION MANAGEMENT | Facility: CLINIC | Age: 78
End: 2018-05-07

## 2018-05-21 ASSESSMENT — ENCOUNTER SYMPTOMS
EYE PAIN: 0
ARTHRALGIAS: 0
DOUBLE VISION: 1
STIFFNESS: 0
EYE REDNESS: 0
EYE WATERING: 0
MUSCLE WEAKNESS: 0
BACK PAIN: 1
EYE IRRITATION: 0
NECK PAIN: 0
JOINT SWELLING: 0
MUSCLE CRAMPS: 0
MYALGIAS: 1

## 2018-06-04 ENCOUNTER — OFFICE VISIT (OUTPATIENT)
Dept: NEUROLOGY | Facility: CLINIC | Age: 78
End: 2018-06-04
Payer: MEDICARE

## 2018-06-04 ENCOUNTER — RESEARCH ENCOUNTER (OUTPATIENT)
Dept: NEUROLOGY | Facility: CLINIC | Age: 78
End: 2018-06-04

## 2018-06-04 VITALS
DIASTOLIC BLOOD PRESSURE: 58 MMHG | HEART RATE: 68 BPM | SYSTOLIC BLOOD PRESSURE: 122 MMHG | WEIGHT: 112 LBS | HEIGHT: 66 IN | BODY MASS INDEX: 18 KG/M2

## 2018-06-04 DIAGNOSIS — G70.00 MG, OCULAR (MYASTHENIA GRAVIS) (H): Primary | ICD-10-CM

## 2018-06-04 NOTE — LETTER
Service Date: 2018      Micheline Lopez MD   [facility]    [address] [place] [address]      RE: Zahida Cespedes   MRN: [id number]   : 1940      Dear Dr. Lopez:      I had the pleasure to see Zahida in followup at the [facility] today for her ocular myasthenia gravis, with positive acetylcholine receptor antibodies, which was relatively difficult to treat.  I saw her in 2018.  At that time she was on Mestinon 120 mg 4 times a day. Oral prednisone was escalated to 50 mg daily by [facility], and then gradually tapered.  I started her on IVIG on 2017, 0.4 gram g/kg daily x5, and she felt this was helpful.  We continued the IVIG at 1 gram per kilogram split in 2 doses of 0.5 g/kg every 3 weeks and she got her last infusion a week ago.  She is scheduled to get a repeat one  and 06/15/2018. I also started her on CellCept. She is currently on 1 gram b.i.d., tolerating it well.  She had liver function tests and CBC done a few weeks ago which were satisfactory.  AST and ALT were mildly elevated at 54 and 53, but not raising major concerns.  She has cut her Mestinon to 60 mg 4 times a day.  Her prednisone is now at 15 mg every other day.  She is much better than a few months ago.  She is able to focus when driving, looking at distance most of the times.  Occasionally, she will have to tilt her head backward about 30 degrees or slowly get it down to the midline in order to retain her focus, but her binocular vision is far improved from before. There is occasional ptosis of the right eyelid that is not particularly bothersome.  There is no dysphagia, dysarthria, sialorrhea, difficulty chewing, head drop, dysphonia, weakness of arms or legs or other signs of generalized MG disease.      CURRENT MEDICATIONS:  Reviewed and are as per Epic record.      PHYSICAL EXAMINATION:   VITAL SIGNS:   Blood pressure is 122/58.  Pulse 68 and regular.  She weighs 50.8 kilos.   Height is 167.   NEUROLOGIC:  She is awake, alert and oriented x3.  She is able to provide a coherent history.  She has mild right eyelid ptosis that is fatigable, worse with sustained upward gaze but improved from a few months ago.  There is no ptosis of the left lid.  There is minimal weakness of right orbicularis oculi and none on the left.  There is no diplopia with vertical gaze upward or downwards.  When she looks to the right and down, she has an obvious left eye hypertropia or right eye hypotropia and some double vision.  There is no double vision in any other cardinal gaze direction.  She has no weakness of orbicularis oris, smile, uvula, jaw, palate or tongue.  Neck flexion and extension strength are 5/5.  Strength is 5/5 in all muscle groups of upper or lower extremities proximally or distally.  There is no dysarthria or dysphonia.     In summary, Ms. Cespedes has ocular myasthenia and she has markedly improved after starting IVIG.  She is now on 1 g/kg every 3 weeks and also on CellCept 2 grams daily and Mestinon 60 mg 4 times a day.  Her prednisone dose is now 15 mg every other day.  We will continue slow tapering.  She will do 15 mg every other day for 3 more weeks, then 10 mg every other day for a month, then she should call me.  If she is doing well on that regimen, we will space out the IVIG infusions to every 4 weeks and then gradually attempt to reduce the dose.  She may need a few more months of those treatments before her disease stabilizes and we can leave her on the CellCept alone.  She understands that. I emphasized the importance of good hydration on the days of IVIG infusion, which can minimize headaches or the risk of thrombotic events.  She will return to the clinic in followup in 4 months. TT spent for patient care 15 minutes; more than half was counseling.      Sincerely,       Erick Fernandez MD       D: 06/04/2018   T: 06/05/2018   MT: AKA    Name:     TACOS CESPEDES   MRN:       [id number]        Account:      [id number]   :      1940           Service Date: 2018    Document: [id number]      Answers for HPI/ROS submitted by the patient on 2018   General Symptoms: No  Skin Symptoms: No  HENT Symptoms: No  EYE SYMPTOMS: Yes  HEART SYMPTOMS: No  LUNG SYMPTOMS: No  INTESTINAL SYMPTOMS: No  URINARY SYMPTOMS: No  GYNECOLOGIC SYMPTOMS: No  BREAST SYMPTOMS: No  SKELETAL SYMPTOMS: Yes  BLOOD SYMPTOMS: No  NERVOUS SYSTEM SYMPTOMS: No  MENTAL HEALTH SYMPTOMS: No  Eye pain: No  Vision loss: No  Dry eyes: No  Watery eyes: No  Eye bulging: No  Double vision: Yes  Flashing of lights: Yes  Spots: No  Floaters: No  Redness: No  Crossed eyes: No  Tunnel Vision: No  Yellowing of eyes: No  Eye irritation: No  Back pain: Yes  Muscle aches: Yes  Neck pain: No  Swollen joints: No  Joint pain: No  Bone pain: No  Muscle cramps: No  Muscle weakness: No  Joint stiffness: No  Bone fracture: No    Again, thank you for allowing me to participate in the care of your patient.      Sincerely,    Erick Fernandez MD

## 2018-06-04 NOTE — PATIENT INSTRUCTIONS
Continue your IVIG as we are currently doing it.  Taper the prednisone as follows:  15 mg every other day for 3 more weeks, then  10 mg every other day for 1 month, then please call me back- or send [employer] message.  If you are doing ok we will cut the frequency of the IVIG infusions.  Follow up in Clinic in 4 months

## 2018-06-04 NOTE — MR AVS SNAPSHOT
After Visit Summary   6/4/2018    Zahida Cespedes    MRN: [id number]           Patient Information     Date Of Birth          1940        Visit Information        Provider Department      6/4/2018 2:20 PM Erick Fernandez MD [facility]        Care Instructions    Continue your IVIG as we are currently doing it.  Taper the prednisone as follows:  15 mg every other day for 3 more weeks, then  10 mg every other day for 1 month, then please call me back- or send [employer] message.  If you are doing ok we will cut the frequency of the IVIG infusions.  Follow up in Clinic in 4 months          Follow-ups after your visit        Follow-up notes from your care team     Return in about 4 months (around 10/4/2018).      Your next 10 appointments already scheduled     Oct 08, 2018  2:20 PM CDT   (Arrive by 2:05 PM)   Return Myasthenia Gravis with Erick Fernandez MD   [facility] ([facility])    [address] [place] [id number] [phone]              Who to contact     Please call your clinic at [phone] to:    Ask questions about your health    Make or cancel appointments    Discuss your medicines    Learn about your test results    Speak to your doctor            Additional Information About Your Visit        [employer] Gateway Information     [employer] gives you secure access to your electronic health record. If you see a primary care provider, you can also send messages to your care team and make appointments. If you have questions, please call your primary care clinic.  If you do not have a primary care provider, please call [phone] and they will assist you.      [employer] is an electronic gateway that provides easy, online access to your medical records. With [employer], you can request a clinic appointment, read your test results, renew a prescription or communicate with your care team.     To access your existing  "account, please contact your [facility] or call [phone] for assistance.        Care EveryWhere ID     This is your Care EveryWhere ID. This could be used by other organizations to access your [place] medical records  [id number]        Your Vitals Were     Pulse Height BMI (Body Mass Index)             68 1.676 m (5' 6\") 18.08 kg/m2          Blood Pressure from Last 3 Encounters:   06/04/18 122/58   01/24/18 143/73   01/19/18 136/69    Weight from Last 3 Encounters:   06/04/18 50.8 kg (112 lb)   01/24/18 52.2 kg (115 lb)   01/15/18 52.6 kg (116 lb)              Today, you had the following     No orders found for display       Primary Care Provider Office Phone # Fax #    Phangeline malini [phone]       [place] [address]        Equal Access to Services     LISA FONTAINE : Hadii aad ku hadasho Soomaali, waaxda luqadaha, qaybta kaalmada adeegyada, waxay raysain hayjuan antonion zari meehan . So [facility] [phone].    ATENCIÓN: Si hung esprenata, tiene a torres disposición servicios gratuitos de asistencia lingüística. Llame al [phone].    We comply with applicable federal civil rights laws and [place] laws. We do not discriminate on the basis of race, color, national origin, age, disability, sex, sexual orientation, or gender identity.            Thank you!     Thank you for choosing [facility]  for your care. Our goal is always to provide you with excellent care. Hearing back from our patients is one way we can continue to improve our services. Please take a few minutes to complete the written survey that you may receive in the mail after your visit with us. Thank you!             Your Updated Medication List - Protect others around you: Learn how to safely use, store and throw away your medicines at www.disposemymeds.org.          This list is accurate as of 6/4/18  2:47 PM.  Always use your most recent med list.          "          Brand Name Dispense Instructions for use Diagnosis    alendronate 70 MG tablet    FOSAMAX     Take 70 mg by mouth once a week        ascorbic acid 1000 MG Tabs    vitamin C     Take 1 tablet by mouth daily        FISH OIL CONCENTRATE 300 MG Caps      Take 1,000 mg by mouth daily        ipratropium 0.06 % spray    ATROVENT     Spray 2 sprays in nostril as needed        Lutein 6 MG Caps      Take 1 capsule by mouth daily        MULTI-VITAMINS Tabs      Take 1 tablet by mouth daily        mycophenolate 500 MG tablet    GENERIC EQUIVALENT    120 tablet    Take 2 tablets twice a day    MG, ocular (myasthenia gravis) (H)       predniSONE 10 MG tablet    DELTASONE    60 tablet    Take 2 tablets alternating with 1 and a half tablets daily for 1 month, then 2 alternating with 1 for 1 month, then 2 alternating with half for 1 month, then 2 every other day    MG, ocular (myasthenia gravis) (H)       pyridostigmine 60 MG tablet    MESTINON    90 tablet    Take 2 tablets (120 mg) by mouth 4 times daily    Myasthenia gravis with exacerbation (H)       RA CALCIUM 500 MG tablet   Generic drug:  calcium carbonate      Take 1 tablet by mouth daily        sulfamethoxazole-trimethoprim 400-80 MG per tablet    BACTRIM/SEPTRA     Take 1 tablet by mouth every morning        VITAMIN B COMPLEX PO      Take 1 capsule by mouth daily        vitamin D3 2000 units Caps      Take 2,000 Units by mouth daily

## 2018-06-04 NOTE — PROGRESS NOTES
[facility][1] [facility] Neuromuscular Registry    IRB # [id number]  PI: Mat Murphy MD, PhD  : Kelly Love    Patient was approached for possible participation for the above study. The current approved IRB consent form was discussed and explained to the patient.  It was discussed that involvement with the study is voluntary and refusal to participate would not involve penalty or decrease benefits at which the patient is entitled, and the subject may discontinue his/her involvement at any time without penalty or loss in benefits. We also discussed that this study does not have follow up visits or procedures. Patient was informed that an additional contact might occur if data needed was not found in patient s medical record. The patient was given time to review and ask any questions about the consent. Patient was shown contact information for PI and study staff in consent for future questions. Patient verbalized understanding of consent and study by restating the purpose, procedures, duration, risk, confidentiality of PHI, and voluntarily participation. Patient printed, signed and dated the consent and HIPAA form prior to study involvement. A copy was given to the patient for their records.     Subject Consent/HIPAA : SIGNED ON 6.4.2018

## 2018-06-05 ENCOUNTER — TELEPHONE (OUTPATIENT)
Dept: NEUROLOGY | Facility: CLINIC | Age: 78
End: 2018-06-05

## 2018-06-05 NOTE — TELEPHONE ENCOUNTER
Patient was in for a clinic visit yesterday and the decision was made to continue IVIG until August. At that time the patient will call to update her on how she is doing and Dr. Fernandez will reevaluate her plan.   Updated IVIG orders faxed to [facility] (fax: [phone])

## 2018-06-05 NOTE — PROGRESS NOTES
Service Date: 2018      Micheline Lopez MD   [facility]    [address] [address]      RE: Zahida Cespedes   MRN: [id number]   : 1940      Dear Dr. Lopez:      I had the pleasure to see Zahida in followup at the [facility] today for her ocular myasthenia gravis, with positive acetylcholine receptor antibodies, which was relatively difficult to treat.  I saw her in 2018.  At that time she was on Mestinon 120 mg 4 times a day. Oral prednisone was escalated to 50 mg daily by [facility], and then gradually tapered.  I started her on IVIG on 2017, 0.4 gram g/kg daily x5, and she felt this was helpful.  We continued the IVIG at 1 gram per kilogram split in 2 doses of 0.5 g/kg every 3 weeks and she got her last infusion a week ago.  She is scheduled to get a repeat one  and 06/15/2018. I also started her on CellCept. She is currently on 1 gram b.i.d., tolerating it well.  She had liver function tests and CBC done a few weeks ago which were satisfactory.  AST and ALT were mildly elevated at 54 and 53, but not raising major concerns.  She has cut her Mestinon to 60 mg 4 times a day.  Her prednisone is now at 15 mg every other day.  She is much better than a few months ago.  She is able to focus when driving, looking at distance most of the times.  Occasionally, she will have to tilt her head backward about 30 degrees or slowly get it down to the midline in order to retain her focus, but her binocular vision is far improved from before. There is occasional ptosis of the right eyelid that is not particularly bothersome.  There is no dysphagia, dysarthria, sialorrhea, difficulty chewing, head drop, dysphonia, weakness of arms or legs or other signs of generalized MG disease.      CURRENT MEDICATIONS:  Reviewed and are as per Epic record.      PHYSICAL EXAMINATION:   VITAL SIGNS:   Blood pressure is 122/58.  Pulse 68 and regular.  She weighs 50.8 kilos.  Height  is 167.   NEUROLOGIC:  She is awake, alert and oriented x3.  She is able to provide a coherent history.  She has mild right eyelid ptosis that is fatigable, worse with sustained upward gaze but improved from a few months ago.  There is no ptosis of the left lid.  There is minimal weakness of right orbicularis oculi and none on the left.  There is no diplopia with vertical gaze upward or downwards.  When she looks to the right and down, she has an obvious left eye hypertropia or right eye hypotropia and some double vision.  There is no double vision in any other cardinal gaze direction.  She has no weakness of orbicularis oris, smile, uvula, jaw, palate or tongue.  Neck flexion and extension strength are 5/5.  Strength is 5/5 in all muscle groups of upper or lower extremities proximally or distally.  There is no dysarthria or dysphonia.     In summary, Ms. Cespedes has ocular myasthenia and she has markedly improved after starting IVIG.  She is now on 1 g/kg every 3 weeks and also on CellCept 2 grams daily and Mestinon 60 mg 4 times a day.  Her prednisone dose is now 15 mg every other day.  We will continue slow tapering.  She will do 15 mg every other day for 3 more weeks, then 10 mg every other day for a month, then she should call me.  If she is doing well on that regimen, we will space out the IVIG infusions to every 4 weeks and then gradually attempt to reduce the dose.  She may need a few more months of those treatments before her disease stabilizes and we can leave her on the CellCept alone.  She understands that. I emphasized the importance of good hydration on the days of IVIG infusion, which can minimize headaches or the risk of thrombotic events.  She will return to the clinic in followup in 4 months. TT spent for patient care 15 minutes; more than half was counseling.      Sincerely,       MD JUAN DAVID Faith MD             D: 06/04/2018   T: 06/05/2018   MT: AKA       Name:     TACOS LARES   MRN:      [id number]        Account:      [id number]   :      1940           Service Date: 2018      Document: [id number]      Answers for HPI/ROS submitted by the patient on 2018   General Symptoms: No  Skin Symptoms: No  HENT Symptoms: No  EYE SYMPTOMS: Yes  HEART SYMPTOMS: No  LUNG SYMPTOMS: No  INTESTINAL SYMPTOMS: No  URINARY SYMPTOMS: No  GYNECOLOGIC SYMPTOMS: No  BREAST SYMPTOMS: No  SKELETAL SYMPTOMS: Yes  BLOOD SYMPTOMS: No  NERVOUS SYSTEM SYMPTOMS: No  MENTAL HEALTH SYMPTOMS: No  Eye pain: No  Vision loss: No  Dry eyes: No  Watery eyes: No  Eye bulging: No  Double vision: Yes  Flashing of lights: Yes  Spots: No  Floaters: No  Redness: No  Crossed eyes: No  Tunnel Vision: No  Yellowing of eyes: No  Eye irritation: No  Back pain: Yes  Muscle aches: Yes  Neck pain: No  Swollen joints: No  Joint pain: No  Bone pain: No  Muscle cramps: No  Muscle weakness: No  Joint stiffness: No  Bone fracture: No

## 2018-07-10 ENCOUNTER — MYC MEDICAL ADVICE (OUTPATIENT)
Dept: NEUROLOGY | Facility: CLINIC | Age: 78
End: 2018-07-10

## 2018-07-10 DIAGNOSIS — G70.00 MG, OCULAR (MYASTHENIA GRAVIS) (H): ICD-10-CM

## 2018-07-11 RX ORDER — MYCOPHENOLATE MOFETIL 500 MG/1
TABLET ORAL
Qty: 120 TABLET | Refills: 2 | Status: SHIPPED | OUTPATIENT
Start: 2018-07-11 | End: 2018-10-23

## 2018-07-20 ENCOUNTER — TELEPHONE (OUTPATIENT)
Dept: NEUROLOGY | Facility: CLINIC | Age: 78
End: 2018-07-20

## 2018-07-20 DIAGNOSIS — G70.00 MG, OCULAR (MYASTHENIA GRAVIS) (H): ICD-10-CM

## 2018-07-20 NOTE — TELEPHONE ENCOUNTER
Dr. Fernandez, infusion center is requesting updated IVIg orders. Current orders state 0.5 g/kg x 2 days repeated every 3 weeks until 08/2018. Do you want to continue until her follow up in October at this frequency?

## 2018-07-20 NOTE — TELEPHONE ENCOUNTER
[facility] Call Center    Phone Message    May a detailed message be left on voicemail: no    Reason for Call: Other: Herlinda from Infusion Center, called to request new/updated orders for infusion. Fax: [phone] Phone: [phone]     Action Taken: Message routed to:  [facility] ([facility] neuro

## 2018-07-20 NOTE — TELEPHONE ENCOUNTER
No, we will change the frequency to every 4 weeks, per discussion I had with the patient on Mychart today. Please edit therapy plan to reflect this change and I will sign. The dose of 0.5g/kg daily x2 days will remain unchanged. Thanks

## 2018-07-21 RX ORDER — PREDNISONE 10 MG/1
TABLET ORAL
Qty: 45 TABLET | Refills: 1 | Status: SHIPPED | OUTPATIENT
Start: 2018-07-21 | End: 2024-07-22

## 2018-07-23 NOTE — TELEPHONE ENCOUNTER
IVIG therapy plan updated to reflect change to every 4 week dosing and routed to Dr. Fernandez for review and signature. Once signed, will fax to [facility] (phone: [phone]; fax: [phone]).

## 2018-08-15 ENCOUNTER — MYC MEDICAL ADVICE (OUTPATIENT)
Dept: NEUROLOGY | Facility: CLINIC | Age: 78
End: 2018-08-15

## 2018-08-15 DIAGNOSIS — G70.01 MYASTHENIA GRAVIS WITH EXACERBATION (H): ICD-10-CM

## 2018-08-16 NOTE — TELEPHONE ENCOUNTER
Dr. Fernandez, please see Zahida's [employer] message below. Are you willing to provide hard copy script for pyridostigmine?

## 2018-08-17 NOTE — TELEPHONE ENCOUNTER
Sure. Please enter the order with the current dose and we will local print it and send it to patient. Thanks

## 2018-08-20 RX ORDER — PYRIDOSTIGMINE BROMIDE 60 MG/1
120 TABLET ORAL 4 TIMES DAILY
Qty: 240 TABLET | Refills: 6 | Status: SHIPPED | OUTPATIENT
Start: 2018-08-20 | End: 2024-07-22

## 2018-08-20 NOTE — TELEPHONE ENCOUNTER
Dr. Fernandez, pyridostigmine Rx pended to you for print and signature. Will then mail to patient.

## 2018-08-20 NOTE — TELEPHONE ENCOUNTER
Rx signed by Dr. Fernandez and placed in the mail to the patient's home. [employer] message sent to her letting her know this.

## 2018-09-17 ENCOUNTER — OFFICE VISIT (OUTPATIENT)
Dept: NEUROLOGY | Facility: CLINIC | Age: 78
End: 2018-09-17
Payer: MEDICARE

## 2018-09-17 VITALS
DIASTOLIC BLOOD PRESSURE: 69 MMHG | SYSTOLIC BLOOD PRESSURE: 128 MMHG | HEART RATE: 66 BPM | WEIGHT: 115.2 LBS | BODY MASS INDEX: 18.51 KG/M2 | HEIGHT: 66 IN

## 2018-09-17 DIAGNOSIS — G70.00 MG, OCULAR (MYASTHENIA GRAVIS) (H): Primary | ICD-10-CM

## 2018-09-17 ASSESSMENT — PAIN SCALES - GENERAL: PAINLEVEL: NO PAIN (0)

## 2018-09-17 NOTE — MR AVS SNAPSHOT
After Visit Summary   9/17/2018    Zahida Cespedes    MRN: [id number]           Patient Information     Date Of Birth          1940        Visit Information        Provider Department      9/17/2018 10:20 AM Erick Fernandez MD [facility]        Care Instructions    Stop IVIG  Reduce prednisone dose to 5 mg every other day, for 1 month, then stop it  Continue mycophenolate  Return to Clinic in 4 months             Follow-ups after your visit        Follow-up notes from your care team     Return in about 4 months (around 1/17/2019).      Your next 10 appointments already scheduled     Jan 21, 2019 10:50 AM CST   (Arrive by 10:35 AM)   Return Myasthenia Gravis with Erick Fernandez MD   [facility] ([facility])    [address] [place] [id number] [phone]              Who to contact     Please call your clinic at [phone] to:    Ask questions about your health    Make or cancel appointments    Discuss your medicines    Learn about your test results    Speak to your doctor            Additional Information About Your Visit        MyChart Information     [employer] gives you secure access to your electronic health record. If you see a primary care provider, you can also send messages to your care team and make appointments. If you have questions, please call your primary care clinic.  If you do not have a primary care provider, please call [phone] and they will assist you.      [employer] is an electronic gateway that provides easy, online access to your medical records. With [employer], you can request a clinic appointment, read your test results, renew a prescription or communicate with your care team.     To access your existing account, please contact your [facility] or call [phone] for assistance.        Care EveryWhere ID     This is your Care EveryWhere ID. This  "could be used by other organizations to access your [facility] medical records  [id number]        Your Vitals Were     Pulse Height BMI (Body Mass Index)             66 1.676 m (5' 6\") 18.59 kg/m2          Blood Pressure from Last 3 Encounters:   09/17/18 128/69   06/04/18 122/58   01/24/18 143/73    Weight from Last 3 Encounters:   09/17/18 52.3 kg (115 lb 3.2 oz)   06/04/18 50.8 kg (112 lb)   01/24/18 52.2 kg (115 lb)              Today, you had the following     No orders found for display       Primary Care Provider Office Phone # Fax #    Phunt yo [phone]       [address] [address]        Equal Access to Services     LOUISE FONTAINE : Hadii aad ku hadasho Sofamilia, waaxda luqadaha, qaybta kaalmada aderonaldyada, joi meehan . So [facility] [phone].    ATENCIÓN: Si habla español, tiene a torres disposición servicios gratuitos de asistencia lingüística. Kody al [phone].    We comply with applicable federal civil rights laws and [place] laws. We do not discriminate on the basis of race, color, national origin, age, disability, sex, sexual orientation, or gender identity.            Thank you!     Thank you for choosing [facility]  for your care. Our goal is always to provide you with excellent care. Hearing back from our patients is one way we can continue to improve our services. Please take a few minutes to complete the written survey that you may receive in the mail after your visit with us. Thank you!             Your Updated Medication List - Protect others around you: Learn how to safely use, store and throw away your medicines at www.disposemymeds.org.          This list is accurate as of 9/17/18 11:01 AM.  Always use your most recent med list.                   Brand Name Dispense Instructions for use Diagnosis    alendronate 70 MG tablet    FOSAMAX     Take 70 mg by mouth once a week        ascorbic acid 1000 MG Tabs "    vitamin C     Take 1 tablet by mouth daily        FISH OIL CONCENTRATE 300 MG Caps      Take 1,000 mg by mouth daily        ipratropium 0.06 % spray    ATROVENT     Spray 2 sprays in nostril as needed        Lutein 6 MG Caps      Take 1 capsule by mouth daily        MULTI-VITAMINS Tabs      Take 1 tablet by mouth daily        mycophenolate 500 MG tablet    GENERIC EQUIVALENT    120 tablet    Take 2 tablets twice a day    MG, ocular (myasthenia gravis) (H)       predniSONE 10 MG tablet    DELTASONE    45 tablet    Take 10 milligrams every other day.    MG, ocular (myasthenia gravis) (H)       pyridostigmine 60 MG tablet    MESTINON    240 tablet    Take 2 tablets (120 mg) by mouth 4 times daily    Myasthenia gravis with exacerbation (H)       RA CALCIUM 500 MG tablet   Generic drug:  calcium carbonate 500 mg {elemental}      Take 1 tablet by mouth daily        VITAMIN B COMPLEX PO      Take 1 capsule by mouth daily        vitamin D3 2000 units Caps      Take 2,000 Units by mouth daily

## 2018-09-17 NOTE — PROGRESS NOTES
"[facility]  09/17/18    Mrs. Cespedes is a 78 year old woman with ocular myasthenia gravis, with positive acetylcholine receptor antibodies, which was relatively difficult to treat. She was last seen in clinic 6/2018.   She took Mestinon 10AM today. She is currently on Mestinon to 60 mg 3 times a day    She was started on IVIG on 12/2017. Since 6/2018, her dose of IVIG was reduced to every 4 weeks from 3 weeks. Her last dose of IVIG was 8/29/2018. In 1/2018, she was started her on CellCept. She is currently on 1 gram b.i.d., tolerating it well. 4/2018 AST and ALT were mildly elevated at 54 and 50, but not raising major concerns. CBC was unremarkable.  She is now on Prednisone 10mg every other day (down from 15 every other day in 1/2018).  She feels that her eyes have been in focus with distance or close by almost al the time. Her right eyelid droop has improved by a lot as well. There is no dysphagia, dysarthria, sialorrhea, difficulty chewing, head drop, dysphonia, weakness of arms or legs or other signs of generalized MG disease.       CURRENT MEDICATIONS:  Reviewed and are as per Epic record.       PHYSICAL EXAMINATION:   VITAL SIGNS: /69 (BP Location: Left arm, Patient Position: Chair, Cuff Size: Adult Small)  Pulse 66  Ht 1.676 m (5' 6\")  Wt 52.3 kg (115 lb 3.2 oz)  BMI 18.59 kg/m2    NEUROLOGIC:  She is awake, alert and oriented x3.  She is able to provide a coherent history.  She has mild right eyelid ptosis that is fatigable, worse with sustained upward gaze but improved from a few months ago.  There is no ptosis of the left lid.  There is no diplopia with vertical gaze upward or downwards. There is no double vision in any other cardinal gaze direction.  She has no weakness of orbicularis oris, smile, uvula, jaw, palate or tongue.  Neck flexion and extension strength are 5/5.  Strength is 5/5 in all muscle groups of upper or lower extremities proximally or " distally.  There is no dysarthria or dysphonia.      In summary, Ms. Cespedes has ocular myasthenia which has markedly improved after starting IVIG in 12/2017 and Cellcept in 1/2018. She has now been on IVIG Q4 weekly dosing for several months. We will stop her IVIG as she has been on CellCept 2 grams daily for a long enough time for a therapeutic effect. She will continue to take Mestinon 60 mg 3 times a day. We will also taper her off of Prednisone by reducing her dose to 5 mg every other day for 1 month then stop. She will follow up in 4 months and will call us if she has worsening of her symptoms. At that point, we will restart her IVIG.     Ana Rosa Carrion DO   [facility] Neuromuscular Fellow [phone]    ATTENDING ADDENDUM: Patient seen and examined with Fellow Dr Carrion at the [facility] today. Agree with her assessment and plan as above. TT spent for patient care 15 minutes; more than half was counseling. Erick Fernandez MD

## 2018-09-17 NOTE — PATIENT INSTRUCTIONS
Stop IVIG  Reduce prednisone dose to 5 mg every other day, for 1 month, then stop it  Continue mycophenolate  Return to Clinic in 4 months      Yes

## 2018-09-17 NOTE — LETTER
"9/17/2018       RE: Zahida Cespedes  [address] [facility] [address]     Dear Colleague,    Thank you for referring your patient, Zahida Cespedes, to the [facility] at [facility]. Please see a copy of my visit note below.    [facility]  09/17/18    Mrs. Cespedes is a 78 year old woman with ocular myasthenia gravis, with positive acetylcholine receptor antibodies, which was relatively difficult to treat.  She was last seen in clinic 6/2018.   She took Mestinon 10AM today. She is currently on Mestinon to 60 mg 3 times a day    She was started on IVIG on 12/2017. Since 6/2018, her dose of IVIG was reduced to every 4 weeks from 3 weeks. Her last dose of IVIG was 8/29/2018. In 1/2018, she was started her on CellCept. She is currently on 1 gram b.i.d., tolerating it well.  4/2018 AST and ALT were mildly elevated at 54 and 50, but not raising major concerns. CBC was unremarkable.   She is now on Prednisone 10mg every other day (down from 15 every other day in 1/2018).  She feels that her eyes have been in focus with distance or close by almost al the time. Her right eyelid droop has improved by a lot as well. There is no dysphagia, dysarthria, sialorrhea, difficulty chewing, head drop, dysphonia, weakness of arms or legs or other signs of generalized MG disease.       CURRENT MEDICATIONS:  Reviewed and are as per Epic record.       PHYSICAL EXAMINATION:   VITAL SIGNS: /69 (BP Location: Left arm, Patient Position: Chair, Cuff Size: Adult Small)  Pulse 66  Ht 1.676 m (5' 6\")  Wt 52.3 kg (115 lb 3.2 oz)  BMI 18.59 kg/m2    NEUROLOGIC:  She is awake, alert and oriented x3.  She is able to provide a coherent history.  She has mild right eyelid ptosis that is fatigable, worse with sustained upward gaze but improved from a few months ago.  There is no ptosis of the left lid.  There is no diplopia with vertical gaze upward " or downwards. There is no double vision in any other cardinal gaze direction.  She has no weakness of orbicularis oris, smile, uvula, jaw, palate or tongue.  Neck flexion and extension strength are 5/5.  Strength is 5/5 in all muscle groups of upper or lower extremities proximally or distally.  There is no dysarthria or dysphonia.      In summary, Ms. Cespedes has ocular myasthenia which has markedly improved after starting IVIG in 12/2017 and Cellcept in 1/2018. She has now been on IVIG Q4 weekly dosing for several months. We will stop her IVIG as she has been on CellCept 2 grams daily for a long enough time for a therapeutic effect. She will continue to take Mestinon 60 mg 3 times a day. We will also taper her off of Prednisone by reducing her dose to 5 mg every other day for 1 month then stop. She will follow up in 4 months and will call us if she has worsening of her symptoms. At that point, we will restart her IVIG.     Ana Rosa Carrion DO   [facility] Neuromuscular Fellow [phone]    ATTENDING ADDENDUM: Patient seen and examined with Fellow Dr Carrion at the [facility] today. Agree with her assessment and plan as above. TT spent for patient care 15 minutes; more than half was counseling.     Erick Fernandez MD

## 2019-01-08 ASSESSMENT — ENCOUNTER SYMPTOMS
TINGLING: 0
WEAKNESS: 0
DIZZINESS: 0
DISTURBANCES IN COORDINATION: 0
NUMBNESS: 0
SEIZURES: 0
PARALYSIS: 0
MEMORY LOSS: 0
TREMORS: 0
HEADACHES: 0
LOSS OF CONSCIOUSNESS: 0
SPEECH CHANGE: 0

## 2019-01-21 ENCOUNTER — OFFICE VISIT (OUTPATIENT)
Dept: NEUROLOGY | Facility: CLINIC | Age: 79
End: 2019-01-21
Payer: MEDICARE

## 2019-01-21 VITALS
DIASTOLIC BLOOD PRESSURE: 64 MMHG | SYSTOLIC BLOOD PRESSURE: 123 MMHG | WEIGHT: 118.2 LBS | OXYGEN SATURATION: 98 % | HEART RATE: 70 BPM | BODY MASS INDEX: 19.08 KG/M2

## 2019-01-21 DIAGNOSIS — Z79.60 LONG-TERM USE OF IMMUNOSUPPRESSANT MEDICATION: Primary | ICD-10-CM

## 2019-01-21 DIAGNOSIS — G70.00 OCULAR MYASTHENIA (H): ICD-10-CM

## 2019-01-21 DIAGNOSIS — Z79.60 LONG-TERM USE OF IMMUNOSUPPRESSANT MEDICATION: ICD-10-CM

## 2019-01-21 LAB
ALT SERPL W P-5'-P-CCNC: 30 U/L (ref 0–50)
AST SERPL W P-5'-P-CCNC: 22 U/L (ref 0–45)
BASOPHILS # BLD AUTO: 0 10E9/L (ref 0–0.2)
BASOPHILS NFR BLD AUTO: 0.7 %
DIFFERENTIAL METHOD BLD: NORMAL
EOSINOPHIL # BLD AUTO: 0.1 10E9/L (ref 0–0.7)
EOSINOPHIL NFR BLD AUTO: 1.6 %
ERYTHROCYTE [DISTWIDTH] IN BLOOD BY AUTOMATED COUNT: 12.7 % (ref 10–15)
HCT VFR BLD AUTO: 43.4 % (ref 35–47)
HGB BLD-MCNC: 14.5 G/DL (ref 11.7–15.7)
IMM GRANULOCYTES # BLD: 0 10E9/L (ref 0–0.4)
IMM GRANULOCYTES NFR BLD: 0.2 %
LYMPHOCYTES # BLD AUTO: 1 10E9/L (ref 0.8–5.3)
LYMPHOCYTES NFR BLD AUTO: 23.7 %
MCH RBC QN AUTO: 31.7 PG (ref 26.5–33)
MCHC RBC AUTO-ENTMCNC: 33.4 G/DL (ref 31.5–36.5)
MCV RBC AUTO: 95 FL (ref 78–100)
MONOCYTES # BLD AUTO: 0.5 10E9/L (ref 0–1.3)
MONOCYTES NFR BLD AUTO: 11.4 %
NEUTROPHILS # BLD AUTO: 2.7 10E9/L (ref 1.6–8.3)
NEUTROPHILS NFR BLD AUTO: 62.4 %
NRBC # BLD AUTO: 0 10*3/UL
NRBC BLD AUTO-RTO: 0 /100
PLATELET # BLD AUTO: 153 10E9/L (ref 150–450)
RBC # BLD AUTO: 4.57 10E12/L (ref 3.8–5.2)
WBC # BLD AUTO: 4.3 10E9/L (ref 4–11)

## 2019-01-21 ASSESSMENT — PAIN SCALES - GENERAL: PAINLEVEL: MILD PAIN (2)

## 2019-01-21 NOTE — PROGRESS NOTES
2019          Miche[place] [address]      RE:    Zahida Cespedes   MRN:  [id number]   :  1940      Dear Dr. Lopez:        I had the pleasure to see Ms. Cespedes in followup at the [facility] for her ocular myasthenia gravis which is acetylcholine receptor antibody positive without thymoma.She is currently on CellCept 2 grams daily (1000 mg b.i.d.)  She stopped the Mestinon a few months ago because she was not feeling it was making any difference.  We last gave her IVIG in 2018 and since stopped.        She continues to be doing very well.  She has occasional mild right eyelid ptosis that does not bother her at all.  She has no diplopia.  There is no dysphagia, dysarthria, sialorrhea, difficulty chewing, head drop, change in voice or speech, respiratory symptoms, arm or leg weakness or other signs of generalized disease.        I noted that in 2018, her CBC showed a modest leukopenia with total white cell count of 2.8.  Platelet count was slightly reduced at 134.  ALT and hepatic function was normal except for borderline elevation of AST at 56.  She has not had a repeat CBC since.  Her baseline white count prior to starting CellCept was about 4.3 and baseline platelet count around 140.      MEDICATIONS:  Reviewed and are as per Epic record.      PHYSICAL EXAMINATION:   VITAL SIGNS:  Blood pressure 123/64, pulse 70 and regular, O2 sat 98% on room air, weight 53.6 kg.  She endorse mild pain at 2/10.     NEUROLOGIC EXAMINATION:  She has very mild right eyelid ptosis, more evident with sustained upward gaze but still the lid margin is at some distance from the pupil and is not covering it.  There is no weakness of orbicularis oculi.  There is no diplopia with horizontal or vertical gaze in all 4 directions at rest.  After sustained upward gaze, she gets a little horizontal diplopia.  She has no weakness of lip seal,  cheek puff, uvula, jaw, palate or tongue.  There is no dysphonia or dysarthria.  Cough is strong.  Neck flexion and extension are strong.  Her strength is 5/5 in upper and lower extremities proximally and distally.      In summary, Ms. Cespedes's ocular myasthenia is in minimal manifestations state or remission.  I will continue the CellCept at the current dose.  Will recheck AST, ALT, and CBC today.  I would say that if the white cell count continues to drop and goes below 2.5, I would reduce her CellCept dose; however, if it is around 3000 or higher, I would not make any changes.She will return to clinic for followup in 6 months or sooner if needed. TT spent for patient care 10 minutes; more than half was counseling.      Sincerely,         Juan David Ivy MD         Answers for HPI/ROS submitted by the patient on 2019   General Symptoms: No  Skin Symptoms: No  HENT Symptoms: No  EYE SYMPTOMS: No  HEART SYMPTOMS: No  LUNG SYMPTOMS: No  INTESTINAL SYMPTOMS: No  URINARY SYMPTOMS: No  GYNECOLOGIC SYMPTOMS: No  BREAST SYMPTOMS: No  SKELETAL SYMPTOMS: No  BLOOD SYMPTOMS: No  NERVOUS SYSTEM SYMPTOMS: Yes  MENTAL HEALTH SYMPTOMS: No  Trouble with coordination: No  Dizziness or trouble with balance: No  Fainting or black-out spells: No  Memory loss: No  Headache: No  Seizures: No  Speech problems: No  Tingling: No  Tremor: No  Weakness: No  Difficulty walking: No  Paralysis: No  Numbness: No  Service Date: 2019     JUAN DAVID IVY MD             D: 2019   T: 2019   MT: BOBY      Name:     TACOS CESPEDES   MRN:      [id number]        Account:      [id number]   :      1940           Service Date: 2019      Document: [id number]

## 2019-01-21 NOTE — LETTER
2019       RE: Zahida Cespedes  [address] [facility] [facility] [address]     Dear Colleague,    Thank you for referring your patient, Zahida Cespedes, to the [facility] NEUROLOGY at [facility]. Please see a copy of my visit note below.    2019       RE:    Zahida Cespedes   MRN:  [id number]   :  1940      Dear Dr. Lopez:        I had the pleasure to see Ms. 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  We last gave her IVIG in 2018 and since stopped.        She continues to be doing very well.  She has occasional mild right eyelid ptosis that does not bother her at all.  She has no diplopia.  There is no dysphagia, dysarthria, sialorrhea, difficulty chewing, head drop, change in voice or speech, respiratory symptoms, arm or leg weakness or other signs of generalized disease.        I noted that in 2018, her CBC showed a modest leukopenia with total white cell count of 2.8.  Platelet count was slightly reduced at 134.  ALT and hepatic function was normal except for borderline elevation of AST at 56.  She has not had a repeat CBC since.  Her baseline white count prior to starting CellCept was about 4.3 and baseline platelet count around 140.      MEDICATIONS:  Reviewed and are as per Epic record.      PHYSICAL EXAMINATION:   VITAL SIGNS:  Blood pressure 123/64, pulse 70 and regular, O2 sat 98% on room air, weight 53.6 kg.  She endorse mild pain at 2/10.     NEUROLOGIC EXAMINATION:  She has very mild right eyelid ptosis, more evident with sustained upward gaze but still the lid margin is at some distance from the pupil and is not covering it.  There is no weakness of orbicularis  oculi.  There is no diplopia with horizontal or vertical gaze in all 4 directions at rest.  After sustained upward gaze, she gets a little horizontal diplopia.  She has no weakness of lip seal, cheek puff, uvula, jaw, palate or tongue.  There is no dysphonia or dysarthria.  Cough is strong.  Neck flexion and extension are strong.  Her strength is 5/5 in upper and lower extremities proximally and distally.      In summary, Ms. Cespedes's ocular myasthenia is in minimal manifestations state or remission.  I will continue the CellCept at the current dose.  Will recheck AST, ALT, and CBC today.  I would say that if the white cell count continues to drop and goes below 2.5, I would reduce her CellCept dose; however, if it is around 3000 or higher, I would not make any changes.She will return to clinic for followup in 6 months or sooner if needed. TT spent for patient care 10 minutes; more than half was counseling.         Service Date: 2019       D: 2019   T: 2019   MT: BOBY      Name:     TACOS CESPEDES   MRN:      [id number]        Account:      [id number]   :      1940           Service Date: 2019      Document: [id number]        Again, thank you for allowing me to participate in the care of your patient.      Sincerely,    Erick Fernandez MD    CC:  [place] [address]

## 2019-01-21 NOTE — NURSING NOTE
Chief Complaint   Patient presents with     RECHECK     [facility] RETURN MYASTHENIA GRAVIS 4 MO F/U       Lisa Alvarado, EMT

## 2019-04-19 ENCOUNTER — DOCUMENTATION ONLY (OUTPATIENT)
Dept: OTHER | Facility: CLINIC | Age: 79
End: 2019-04-19

## 2019-05-24 ENCOUNTER — MYC REFILL (OUTPATIENT)
Dept: NEUROLOGY | Facility: CLINIC | Age: 79
End: 2019-05-24

## 2019-05-24 DIAGNOSIS — G70.00 MG, OCULAR (MYASTHENIA GRAVIS) (H): ICD-10-CM

## 2019-05-28 RX ORDER — MYCOPHENOLATE MOFETIL 500 MG/1
TABLET ORAL
Qty: 120 TABLET | Refills: 2 | Status: SHIPPED | OUTPATIENT
Start: 2019-05-28 | End: 2019-10-01

## 2019-07-22 ENCOUNTER — OFFICE VISIT (OUTPATIENT)
Dept: NEUROLOGY | Facility: CLINIC | Age: 79
End: 2019-07-22
Payer: MEDICARE

## 2019-07-22 VITALS
DIASTOLIC BLOOD PRESSURE: 63 MMHG | HEART RATE: 72 BPM | BODY MASS INDEX: 18.88 KG/M2 | OXYGEN SATURATION: 98 % | WEIGHT: 117 LBS | SYSTOLIC BLOOD PRESSURE: 123 MMHG

## 2019-07-22 DIAGNOSIS — Z79.60 LONG-TERM USE OF IMMUNOSUPPRESSANT MEDICATION: ICD-10-CM

## 2019-07-22 DIAGNOSIS — G70.00 MG, OCULAR (MYASTHENIA GRAVIS) (H): ICD-10-CM

## 2019-07-22 DIAGNOSIS — Z79.60 LONG-TERM USE OF IMMUNOSUPPRESSANT MEDICATION: Primary | ICD-10-CM

## 2019-07-22 LAB
BASOPHILS # BLD AUTO: 0 10E9/L (ref 0–0.2)
BASOPHILS NFR BLD AUTO: 0.7 %
DIFFERENTIAL METHOD BLD: NORMAL
EOSINOPHIL # BLD AUTO: 0.1 10E9/L (ref 0–0.7)
EOSINOPHIL NFR BLD AUTO: 1.8 %
ERYTHROCYTE [DISTWIDTH] IN BLOOD BY AUTOMATED COUNT: 12.4 % (ref 10–15)
HCT VFR BLD AUTO: 44.3 % (ref 35–47)
HGB BLD-MCNC: 14.8 G/DL (ref 11.7–15.7)
IMM GRANULOCYTES # BLD: 0 10E9/L (ref 0–0.4)
IMM GRANULOCYTES NFR BLD: 0.2 %
LYMPHOCYTES # BLD AUTO: 1 10E9/L (ref 0.8–5.3)
LYMPHOCYTES NFR BLD AUTO: 23.3 %
MCH RBC QN AUTO: 32.4 PG (ref 26.5–33)
MCHC RBC AUTO-ENTMCNC: 33.4 G/DL (ref 31.5–36.5)
MCV RBC AUTO: 97 FL (ref 78–100)
MONOCYTES # BLD AUTO: 0.5 10E9/L (ref 0–1.3)
MONOCYTES NFR BLD AUTO: 11.3 %
NEUTROPHILS # BLD AUTO: 2.7 10E9/L (ref 1.6–8.3)
NEUTROPHILS NFR BLD AUTO: 62.7 %
NRBC # BLD AUTO: 0 10*3/UL
NRBC BLD AUTO-RTO: 0 /100
PLATELET # BLD AUTO: 166 10E9/L (ref 150–450)
RBC # BLD AUTO: 4.57 10E12/L (ref 3.8–5.2)
WBC # BLD AUTO: 4.3 10E9/L (ref 4–11)

## 2019-07-22 SDOH — HEALTH STABILITY: MENTAL HEALTH: HOW OFTEN DO YOU HAVE A DRINK CONTAINING ALCOHOL?: 4 OR MORE TIMES A WEEK

## 2019-07-22 SDOH — HEALTH STABILITY: MENTAL HEALTH: HOW MANY STANDARD DRINKS CONTAINING ALCOHOL DO YOU HAVE ON A TYPICAL DAY?: 1 OR 2

## 2019-07-22 ASSESSMENT — PAIN SCALES - GENERAL: PAINLEVEL: NO PAIN (0)

## 2019-07-22 NOTE — LETTER
7/22/2019       RE: Zahida Cespedes  [address] [address] [address]     Dear Colleague,    Thank you for referring your patient, Zahida Cespedes, to the [facility] NEUROLOGY at [facility]. Please see a copy of my visit note below.        [facility] [place]   Neuromuscular Clinic Progress Note  Zahida Cespedes  [id number]  07/22/2019    Brief Summary:    Ms. 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 She was last seen in clinic in January 2019. She was started on CellCept in January 2018 and currently taking 2 grams daily (1000 mg b.i.d.). Reports tolerating medication well and denies any side effects. She stopped the Mestinon a few months ago because she was not feeling it was making any difference.  We last gave her IVIG in 09/2018 and since stopped.        She continues to be doing very well. Denies any complaints of diplopia, dysphagia, dysarthria, sialorrhea, difficulty chewing, head drop, change in voice or speech, respiratory symptoms, arm or leg weakness or other signs of generalized disease. She has mild right ptosis that she reports has been present forever and does not seem to bother her at all. She is independent with daily activities of life and walks without support.      She was noted to have modest leukopenia with total white cell count of 2.8 in 07/2018. Repeat labs (CBC and Hepatic function panel) in January 2019 were unremarkable and white count returned to her baseline of 4.3.      CURRENT MEDICATIONS:  Reviewed and are as per Epic record.      PHYSICAL EXAMINATION:   VITAL SIGNS:  Blood pressure 123/64, pulse 70 and regular, O2 sat 98% on room air, weight 53.6 kg.  She endorse mild pain at 2/10.       NEUROLOGIC EXAMINATION: She is awake, alert and oriented to time,  place and person. Language is intact. Speech is coherent. She has very mild right eyelid ptosis that is mildly fatigable with sustained upward gaze. There is no ptosis of the left eye lid.  There is no diplopia with horizontal or vertical gaze in all 4 directions at rest. She has no weakness of orbicularis oris, smile, uvula, jaw, palate or tongue.  Neck flexion and extension strength are 5/5.  Strength is 5/5 in all muscle groups of upper or lower extremities proximally or distally.  There is no dysarthria or dysphonia.     In summary, Ms. Amato has ocular myasthenia that responded remarkably to IVIG and CellCept and has been in remission for the past 18 months. Given the stability of disease, we plan to gradually taper off Cellcept. We discussed with patient to decrease the dose to 3 tablets daily (1500 mg/day) for the next 6 months. If patient tolerates well, will continue tapering off the medication by 500 mg every 6 months. Patient is in agreement with the plan. We will repeat a CBC today. She will return to clinic for followup in 6 months or sooner if needed.     Patient was seen and discussed with attending neurologist, Dr. Fernandez, who agrees with above.    Dara Pendleton MD  Clinical Neurophysiology Fellow, PGY-5  [phone]    ATTENDING ADDENDUM: Patient seen and examined with Fellow Dr Pendleton at the [facility] today. Agree with her assessment and plan as above. TT spent for patient care 15 minutes; more than half was counseling. Erick Fernandez MD

## 2019-07-22 NOTE — PROGRESS NOTES
[facility], [place]   Neuromuscular Clinic Progress Note  Zahida Cespedes  [id number]  07/22/2019    Brief Summary:    Ms. 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 She was last seen in clinic in January 2019. She was started on CellCept in January 2018 and currently taking 2 grams daily (1000 mg b.i.d.). Reports tolerating medication well and denies any side effects. She stopped the Mestinon a few months ago because she was not feeling it was making any difference.  We last gave her IVIG in 09/2018 and since stopped.        She continues to be doing very well. Denies any complaints of diplopia, dysphagia, dysarthria, sialorrhea, difficulty chewing, head drop, change in voice or speech, respiratory symptoms, arm or leg weakness or other signs of generalized disease. She has mild right ptosis that she reports has been present forever and does not seem to bother her at all. She is independent with daily activities of life and walks without support.      She was noted to have modest leukopenia with total white cell count of 2.8 in 07/2018. Repeat labs (CBC and Hepatic function panel) in January 2019 were unremarkable and white count returned to her baseline of 4.3.      CURRENT MEDICATIONS:  Reviewed and are as per Epic record.      PHYSICAL EXAMINATION:   VITAL SIGNS:  Blood pressure 123/64, pulse 70 and regular, O2 sat 98% on room air, weight 53.6 kg.  She endorse mild pain at 2/10.       NEUROLOGIC EXAMINATION: She is awake, alert and oriented to time, place and person. Language is intact. Speech is coherent. She has very mild right eyelid ptosis that is mildly fatigable with sustained upward gaze. There is no ptosis of the left eye lid.  There is no diplopia with horizontal or vertical gaze in all 4 directions at rest. She has no  weakness of orbicularis oris, smile, uvula, jaw, palate or tongue.  Neck flexion and extension strength are 5/5.  Strength is 5/5 in all muscle groups of upper or lower extremities proximally or distally.  There is no dysarthria or dysphonia.     In summary, Ms. Amato has ocular myasthenia that responded remarkably to IVIG and CellCept and has been in remission for the past 18 months. Given the stability of disease, we plan to gradually taper off Cellcept. We discussed with patient to decrease the dose to 3 tablets daily (1500 mg/day) for the next 6 months. If patient tolerates well, will continue tapering off the medication by 500 mg every 6 months. Patient is in agreement with the plan. We will repeat a CBC today. She will return to clinic for followup in 6 months or sooner if needed.     Patient was seen and discussed with attending neurologist, Dr. Fernandez, who agrees with above.    Dara Pendleton MD  Clinical Neurophysiology Fellow, PGY-5  [phone]    ATTENDING ADDENDUM: Patient seen and examined with Fellow Dr Pendleton at the [facility] today. Agree with her assessment and plan as above. TT spent for patient care 15 minutes; more than half was counseling. Erick Fernandez MD

## 2019-07-22 NOTE — NURSING NOTE
Chief Complaint   Patient presents with     RECHECK     [facility] RETURN - 6 MONTH FOLLOW UP       Mitch Torres, EMT

## 2019-10-01 DIAGNOSIS — G70.00 MG, OCULAR (MYASTHENIA GRAVIS) (H): ICD-10-CM

## 2019-10-02 RX ORDER — MYCOPHENOLATE MOFETIL 500 MG/1
TABLET ORAL
Qty: 90 TABLET | Refills: 3 | Status: SHIPPED | OUTPATIENT
Start: 2019-10-02 | End: 2020-02-10

## 2019-10-02 NOTE — TELEPHONE ENCOUNTER
Rx Authorization:    Requested Medication/ Dose mycophenolate (GENERIC EQUIVALENT) 500 MG tablet    Date last refill ordered: 05/28/19    Quantity ordered: 120    # refills: 2    Date of last clinic visit with ordering provider: 07/22/19    Date of next clinic visit with ordering provider: 01/20/20    All pertinent protocol data (lab date/result):     Include pertinent information from patients message:

## 2019-11-08 ENCOUNTER — HEALTH MAINTENANCE LETTER (OUTPATIENT)
Age: 79
End: 2019-11-08

## 2020-01-20 ENCOUNTER — OFFICE VISIT (OUTPATIENT)
Dept: NEUROLOGY | Facility: CLINIC | Age: 80
End: 2020-01-20
Payer: MEDICARE

## 2020-01-20 VITALS
DIASTOLIC BLOOD PRESSURE: 68 MMHG | HEART RATE: 70 BPM | SYSTOLIC BLOOD PRESSURE: 112 MMHG | RESPIRATION RATE: 16 BRPM | BODY MASS INDEX: 18.8 KG/M2 | OXYGEN SATURATION: 99 % | HEIGHT: 66 IN | WEIGHT: 117 LBS

## 2020-01-20 DIAGNOSIS — Z79.60 LONG-TERM USE OF IMMUNOSUPPRESSANT MEDICATION: ICD-10-CM

## 2020-01-20 DIAGNOSIS — G70.00 OCULAR MYASTHENIA (H): Primary | ICD-10-CM

## 2020-01-20 DIAGNOSIS — G70.00 OCULAR MYASTHENIA (H): ICD-10-CM

## 2020-01-20 LAB
ALT SERPL W P-5'-P-CCNC: 28 U/L (ref 0–50)
AST SERPL W P-5'-P-CCNC: 15 U/L (ref 0–45)
BASOPHILS # BLD AUTO: 0 10E9/L (ref 0–0.2)
BASOPHILS NFR BLD AUTO: 0.6 %
DIFFERENTIAL METHOD BLD: NORMAL
EOSINOPHIL # BLD AUTO: 0.1 10E9/L (ref 0–0.7)
EOSINOPHIL NFR BLD AUTO: 1.5 %
ERYTHROCYTE [DISTWIDTH] IN BLOOD BY AUTOMATED COUNT: 12.1 % (ref 10–15)
HCT VFR BLD AUTO: 44.3 % (ref 35–47)
HGB BLD-MCNC: 14.5 G/DL (ref 11.7–15.7)
IMM GRANULOCYTES # BLD: 0 10E9/L (ref 0–0.4)
IMM GRANULOCYTES NFR BLD: 0.2 %
LYMPHOCYTES # BLD AUTO: 1.2 10E9/L (ref 0.8–5.3)
LYMPHOCYTES NFR BLD AUTO: 23 %
MCH RBC QN AUTO: 32.2 PG (ref 26.5–33)
MCHC RBC AUTO-ENTMCNC: 32.7 G/DL (ref 31.5–36.5)
MCV RBC AUTO: 98 FL (ref 78–100)
MONOCYTES # BLD AUTO: 0.5 10E9/L (ref 0–1.3)
MONOCYTES NFR BLD AUTO: 10.3 %
NEUTROPHILS # BLD AUTO: 3.4 10E9/L (ref 1.6–8.3)
NEUTROPHILS NFR BLD AUTO: 64.4 %
NRBC # BLD AUTO: 0 10*3/UL
NRBC BLD AUTO-RTO: 0 /100
PLATELET # BLD AUTO: 164 10E9/L (ref 150–450)
RBC # BLD AUTO: 4.51 10E12/L (ref 3.8–5.2)
WBC # BLD AUTO: 5.3 10E9/L (ref 4–11)

## 2020-01-20 ASSESSMENT — PAIN SCALES - GENERAL: PAINLEVEL: NO PAIN (0)

## 2020-01-20 ASSESSMENT — MIFFLIN-ST. JEOR: SCORE: 1012.46

## 2020-01-20 NOTE — PROGRESS NOTES
Service Date: 2020      Micheline Lopze MD   [address] [address]      RE: Zahida Cespedes   MRN: [id number]   : 1940      Dear Dr. Lopez:      I had the pleasure to see Ms. Cespedes in followup at the [facility] for her acetylcholine receptor antibody positive, predominantly ocular myasthenia gravis without thymoma.  I last saw her in 2019.  At that time, she was doing well on CellCept, taking 1 gram b.i.d.  We lowered the dose to 500 mg in the morning and 1000 mg in the evening.  She is tolerating it well without side effects.  She does not take any other myasthenia medications.  She is very satisfied with her symptom control.  She is really not bothered by any ptosis, diplopia, dysarthria, dysphagia, sialorrhea, head drop, or respiratory symptoms, upper or lower extremity weakness.       MEDICATIONS:  Reviewed and are as per Epic record.      PHYSICAL EXAMINATION:   VITAL SIGNS:  Blood pressure is 112/68.  Pulse 70 and regular.  Respiratory rate 16.  O2 sat 99% on room air.  Weight is 53.1 kilos.  Height is 167.  She endorsed no pain.   NEUROLOGIC:  She has a mild baseline right eyelid ptosis that does not get substantially aggravated even after 45 seconds of sustained upward gaze.  There is no ptosis on the left, even with sustained upgaze.  There is no weakness of her orbicularis oculi or oris.  Ductions and versions of the eyes are full, and there is no diplopia in any cardinal gaze position.  There is no weakness of cheek puff, uvula, jaw, palate or tongue.  No dysphonia or dysarthria.  Neck flexion and extension strength is full.  Strength is 5/5 in upper extremities and lower extremities proximally and distally.      In summary, Ms. Cespedes's predominantly ocular myasthenia remains in remission.  We will lower the CellCept dose by 500 mg so she will take 500 mg b.i.d. for the next 6 months and then if she is doing well, we will  taper down to 500 mg daily.  She is very satisfied with the overall result.  We will check CBC, AST and ALT today.  Follow up in 6 months or sooner if needed.      Sincerely,         Juan David Ivy MD      Total time spent for patient care 10 minutes, more than half was counseling.         JUAN DAVID IVY MD             D: 2020   T: 2020   MT: al      Name:     TACOS LARES   MRN:      [id number]        Account:      [id number]   :      1940           Service Date: 2020      Document: [id number]

## 2020-01-20 NOTE — PATIENT INSTRUCTIONS
Continue the Cellcept, now at 2 pills a day (1 morning, 1 evening).    Labs today    Follow up in 6 months    Thank you!

## 2020-01-20 NOTE — LETTER
2020       RE: Zahida Cespedes  [address]  [place] [address]     Dear Colleague,    Thank you for referring your patient, Zahida Cespedes, to the [facility] NEUROLOGY at [facility]. Please see a copy of my visit note below.          X    Service Date: 2020      Micheline Lopez MD   [facility] [place]   [address] [place] [address]      RE: Zahida Cespedes   MRN: [id number]   : 1940      Dear Dr. Lopez:      I had the pleasure to see Ms. Cespedes in followup at the [facility] for her acetylcholine receptor antibody positive, predominantly ocular myasthenia gravis without thymoma.  I last saw her in 2019.  At that time, she was doing well on CellCept, taking 1 gram b.i.d.  We lowered the dose to 500 mg in the morning and 1000 mg in the evening.  She is tolerating it well without side effects.  She does not take any other myasthenia medications.  She is very satisfied with her symptom control.  She is really not bothered by any ptosis, diplopia, dysarthria, dysphagia, sialorrhea, head drop, or respiratory symptoms, upper or lower extremity weakness.       MEDICATIONS:  Reviewed and are as per Epic record.      PHYSICAL EXAMINATION:   VITAL SIGNS:  Blood pressure is 112/68.  Pulse 70 and regular.  Respiratory rate 16.  O2 sat 99% on room air.  Weight is 53.1 kilos.  Height is 167.  She endorsed no pain.   NEUROLOGIC:  She has a mild baseline right eyelid ptosis that does not get substantially aggravated even after 45 seconds of sustained upward gaze.  There is no ptosis on the left, even with sustained upgaze.  There is no weakness of her orbicularis oculi or oris.  Ductions and versions of the eyes are full, and there is no diplopia in any cardinal gaze position.  There is no weakness of cheek puff, uvula, jaw, palate or tongue.  No dysphonia or dysarthria.  Neck flexion and extension strength is full.  Strength is  5/5 in upper extremities and lower extremities proximally and distally.      In summary, Ms. Cespedes's predominantly ocular myasthenia remains in remission.  We will lower the CellCept dose by 500 mg so she will take 500 mg b.i.d. for the next 6 months and then if she is doing well, we will taper down to 500 mg daily.  She is very satisfied with the overall result.  We will check CBC, AST and ALT today.  Follow up in 6 months or sooner if needed.      Sincerely,      Erick Fernandez MD      Total time spent for patient care 10 minutes, more than half was counseling.      D: 2020   T: 2020   MT: al      Name:     TACOS CESPEDES   MRN:      [id number]        Account:      [id number]   :      1940           Service Date: 2020      Document: [id number]

## 2020-01-20 NOTE — LETTER
2020      RE: Zahida Cespedes  [address]  [place] [address]         Service Date: 2020      Micheline Lopez MD   [facility] [place]   [address] [place] [address]      RE: Zahida Cespedes   MRN: [id number]   : 1940      Dear Dr. Lopez:      I had the pleasure to see Ms. Cespedes in followup at the [facility] for her acetylcholine receptor antibody positive, predominantly ocular myasthenia gravis without thymoma.  I last saw her in 2019.  At that time, she was doing well on CellCept, taking 1 gram b.i.d.  We lowered the dose to 500 mg in the morning and 1000 mg in the evening.  She is tolerating it well without side effects.  She does not take any other myasthenia medications.  She is very satisfied with her symptom control.  She is really not bothered by any ptosis, diplopia, dysarthria, dysphagia, sialorrhea, head drop, or respiratory symptoms, upper or lower extremity weakness.       MEDICATIONS:  Reviewed and are as per Epic record.      PHYSICAL EXAMINATION:   VITAL SIGNS:  Blood pressure is 112/68.  Pulse 70 and regular.  Respiratory rate 16.  O2 sat 99% on room air.  Weight is 53.1 kilos.  Height is 167.  She endorsed no pain.   NEUROLOGIC:  She has a mild baseline right eyelid ptosis that does not get substantially aggravated even after 45 seconds of sustained upward gaze.  There is no ptosis on the left, even with sustained upgaze.  There is no weakness of her orbicularis oculi or oris.  Ductions and versions of the eyes are full, and there is no diplopia in any cardinal gaze position.  There is no weakness of cheek puff, uvula, jaw, palate or tongue.  No dysphonia or dysarthria.  Neck flexion and extension strength is full.  Strength is 5/5 in upper extremities and lower extremities proximally and distally.      In summary, Ms. Chamberss predominantly ocular myasthenia remains in remission.  We will lower the CellCept dose by 500 mg so  she will take 500 mg b.i.d. for the next 6 months and then if she is doing well, we will taper down to 500 mg daily.  She is very satisfied with the overall result.  We will check CBC, AST and ALT today.  Follow up in 6 months or sooner if needed.      Sincerely,         Jua nDavid Ivy MD      Total time spent for patient care 10 minutes, more than half was counseling.         JUAN DAVID IVY MD             D: 2020   T: 2020   MT: al      Name:     TACOS LARES   MRN:      [id number]        Account:      [id number]   :      1940           Service Date: 2020      Document: [id number]

## 2020-01-20 NOTE — NURSING NOTE
Chief Complaint   Patient presents with     Myasthenia Gravis     [facility] RETURN MYASTHENIA GRAVIS 6 MO F/U     Irish Cardozo CMA

## 2020-02-23 ENCOUNTER — HEALTH MAINTENANCE LETTER (OUTPATIENT)
Age: 80
End: 2020-02-23

## 2020-07-20 DIAGNOSIS — G70.00 MG, OCULAR (MYASTHENIA GRAVIS) (H): ICD-10-CM

## 2020-07-20 RX ORDER — MYCOPHENOLATE MOFETIL 500 MG/1
TABLET ORAL
Qty: 60 TABLET | Refills: 3 | Status: SHIPPED | OUTPATIENT
Start: 2020-07-20 | End: 2020-11-04

## 2020-07-29 ENCOUNTER — VIRTUAL VISIT (OUTPATIENT)
Dept: NEUROLOGY | Facility: CLINIC | Age: 80
End: 2020-07-29
Payer: MEDICARE

## 2020-07-29 DIAGNOSIS — G70.00 MG, IN REMISSION (MYASTHENIA GRAVIS) (H): Primary | ICD-10-CM

## 2020-07-29 ASSESSMENT — ENCOUNTER SYMPTOMS
DOUBLE VISION: 1
EYE REDNESS: 0
EYE WATERING: 0
EYE PAIN: 0

## 2020-07-29 NOTE — PROGRESS NOTES
"   Neurology Progress Note    Patient Name:  Zahida Cespedes  MRN:  [id number]    :  1940    Interval history   Ms. 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 She was last seen in the clinic in 2020.  At that time, she was doing well on CellCept, so dose was lowered to 500 mg b.i.d.  She is tolerating it well without side effects.  She does not take any other medications for the myasthenia.  She is very satisfied with her symptom control. She reported some ptosis in her right eye which has been stable with no progression. She sometimes feels the eyes do not focus late at night every once in a while.  She denies diplopia, dysarthria, dysphagia, head drop,shortness of breath, upper or lower extremity weakness.    She exercises an hour every morning without fatigue. She had some lab tests in [facility] and clinic a week ago but the results are not sent to us     Physical Examination   Exam is very limited given the nature of the video visits.   She is very pleasant woman. She is sitting on a chair with no acute distress    Neuro:  Mental status: Awake, alert, attentive, oriented. Speech is fluent, no dysarthria.  Cranial nerves: baseline right ptosis. Sustained upward gaze for 45 seconds did not induce diplopia or aggravate the ptosis, eyes conjugate, EOMI without diplopia, face symmetric, no weakness of the orbicularis oculi or oris or the buccinators. Tongue midline and move right and left without obvious weakness. She has strong cough. She is able to say \"Ka Ka Ka, La La La and Pa Pa Pa\" without dysarthria. She was able to count from 1-50 without noticeable fatigue or dysarthria.   Motor: She was able to lift her right and left arms independently for 90 seconds each without drift.    Investigations   CBC, AST, ALT are done one week ago but not sent to us from [facility] " yet    Assessment and Plan:    Ms. Amato is an 80-year-old lady with predominantly ocular myasthenia , who remains in remission.    She is on CellCept 500 mg twice daily.  Given that she continued to do well on this dose, we are going to lower the CellCept dose by 500 mg so she will take 500 mg daily for 6 months. After 6 months, if she continues to do well, we may stop the drug completely.  We will get the results of CBC, AST and ALT that was done last week from [facility].      follow up in 6 months, however if she had concern or question or worsening of symptoms she will let us know      Patient was seen and discussed by video with Dr. Rebecca Mccall MD  Neurology PGY3  P: [phone]    ATTENDING ADDENDUM: I was present via live interactive audiovisual equipment for the entire visit and directly supervised Dr Mccall who conducted the video interview and examination of the patient. I agree with her impression and recommendations as above. TT spent for patient care 10 minutes (video start time 11:50 am, video end time: 12:00 pm); more than half was counseling. Erick Fernandez MD    ADDENDUM (10/12/2020): Mrs Cespedes called us in late 9/2020 complaining of re-emergence of her diplopia. She is facing considerable stress within her family. I recommended increasing her Cellcept dose to 500 mg bid but this did not help. She is having trouble focusing with her eyes and diplopia is getting bothersome. She previously did not respond well to high oral prednisone doses, whereas she had responded nicely to IVIG. For that reason, I recommend another IVIG loading regimen of 0.4 g/kg daily x5. I would like to examine patient after completion of the load to determine need for maintenance dose, and if so, dose and frequency. She concurs with this plan. GM      Answers for HPI/ROS submitted by the patient on 7/29/2020   General Symptoms: No  Skin Symptoms: No  HENT Symptoms: No  EYE  SYMPTOMS: Yes  HEART SYMPTOMS: No  LUNG SYMPTOMS: No  INTESTINAL SYMPTOMS: No  URINARY SYMPTOMS: No  GYNECOLOGIC SYMPTOMS: No  BREAST SYMPTOMS: No  SKELETAL SYMPTOMS: No  BLOOD SYMPTOMS: No  NERVOUS SYSTEM SYMPTOMS: No  MENTAL HEALTH SYMPTOMS: No  Eye pain: No  Vision loss: No  Dry eyes: No  Watery eyes: No  Eye bulging: No  Double vision: Yes  Flashing of lights: No  Spots: No  Redness: No  Crossed eyes: No  Tunnel Vision: No  Yellowing of eyes: No

## 2020-07-29 NOTE — LETTER
7/29/2020       RE: Zahida Cespedes  [address] [address] [address]     Dear Colleague,    Thank you for referring your patient, Zahida Cespedes, to the [facility] NEUROLOGY at [facility]. Please see a copy of my visit note below.    Zahida Cespedes is a 80 year old female who is being evaluated via a billable video visit.      If you are dropped from the video visit, the video invite should be resent to: Answers for HPI/ROS submitted by the patient on 7/29/2020   General Symptoms: No  Skin Symptoms: No  HENT Symptoms: No  EYE SYMPTOMS: Yes  HEART SYMPTOMS: No  LUNG SYMPTOMS: No  INTESTINAL SYMPTOMS: No  URINARY SYMPTOMS: No  GYNECOLOGIC SYMPTOMS: No  BREAST SYMPTOMS: No  SKELETAL SYMPTOMS: No  BLOOD SYMPTOMS: No  NERVOUS SYSTEM SYMPTOMS: No  MENTAL HEALTH SYMPTOMS: No  Eye pain: No  Vision loss: No  Dry eyes: No  Watery eyes: No  Eye bulging: No  Double vision: Yes  Flashing of lights: No  Spots: No  Redness: No  Crossed eyes: No  Tunnel Vision: No  Yellowing of eyes: No  Will anyone else be joining your video visit? No        Video-Visit Details    Type of service:  Video Visit    Video Start Time: 11.50 am Answers for HPI/ROS submitted by the patient on 7/29/2020   General Symptoms: No  Skin Symptoms: No  HENT Symptoms: No  EYE SYMPTOMS: Yes  HEART SYMPTOMS: No  LUNG SYMPTOMS: No  INTESTINAL SYMPTOMS: No  URINARY SYMPTOMS: No  GYNECOLOGIC SYMPTOMS: No  BREAST SYMPTOMS: No  SKELETAL SYMPTOMS: No  BLOOD SYMPTOMS: No  NERVOUS SYSTEM SYMPTOMS: No  MENTAL HEALTH SYMPTOMS: No  Eye pain: No  Vision loss: No  Dry eyes: No  Watery eyes: No  Eye bulging: No  Double vision: Yes  Flashing of lights: No  Spots: No  Redness: No  Crossed eyes: No  Tunnel Vision: No  Yellowing of eyes: No    Video End Time: 12:00 pm    Originating Location (pt. Location): Home    Distant Location (provider location):  [facility] NEUROLOGY     Platform used for Video Visit: Day [employer]an  "EMT             Neurology Progress Note    Patient Name:  Zahida Cespedes  MRN:  [id number]    :  1940    Interval history   Ms. 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 She was last seen in the clinic in 2020.  At that time, she was doing well on CellCept, so dose was lowered to 500 mg b.i.d.  She is tolerating it well without side effects.  She does not take any other medications for the myasthenia.  She is very satisfied with her symptom control. She reported some ptosis in her right eye which has been stable with no progression. She sometimes feels the eyes do not focus late at night every once in a while.  She denies diplopia, dysarthria, dysphagia, head drop,shortness of breath, upper or lower extremity weakness.    She exercises an hour every morning without fatigue. She had some lab tests in [facility] and clinic a week ago but the results are not sent to us     Physical Examination   Exam is very limited given the nature of the video visits.   She is very pleasant woman. She is sitting on a chair with no acute distress    Neuro:  Mental status: Awake, alert, attentive, oriented. Speech is fluent, no dysarthria.  Cranial nerves: baseline right ptosis. Sustained upward gaze for 45 seconds did not induce diplopia or aggravate the ptosis, eyes conjugate, EOMI without diplopia, face symmetric, no weakness of the orbicularis oculi or oris or the buccinators. Tongue midline and move right and left without obvious weakness. She has strong cough. She is able to say \"Ka Ka Ka, La La La and Pa Pa Pa\" without dysarthria. She was able to count from 1-50 without noticeable fatigue or dysarthria.   Motor: She was able to lift her right and left arms independently for 90 seconds each without drift.    Investigations   CBC, AST, ALT are done one week ago but not sent to us from [facility] " yet    Assessment and Plan:    Ms. Amato is an 80-year-old lady with predominantly ocular myasthenia , who remains in remission.    She is on CellCept 500 mg twice daily.  Given that she continued to do well on this dose, we are going to lower the CellCept dose by 500 mg so she will take 500 mg daily for 6 months. After 6 months, if she continues to do well, we may stop the drug completely.  We will get the results of CBC, AST and ALT that was done last week from [facility].      follow up in 6 months, however if she had concern or question or worsening of symptoms she will let us know      Patient was seen and discussed by video with Dr. Rebecca Mccall MD  Neurology PGY3  P: [phone]    ATTENDING ADDENDUM: I was present via live interactive audiovisual equipment for the entire visit and directly supervised Dr Mccall who conducted the video interview and examination of the patient. I agree with her impression and recommendations as above. TT spent for patient care 10 minutes (video start time 11:50 am, video end time: 12:00 pm); more than half was counseling. Erick Fernandez MD      Answers for HPI/ROS submitted by the patient on 7/29/2020   General Symptoms: No  Skin Symptoms: No  HENT Symptoms: No  EYE SYMPTOMS: Yes  HEART SYMPTOMS: No  LUNG SYMPTOMS: No  INTESTINAL SYMPTOMS: No  URINARY SYMPTOMS: No  GYNECOLOGIC SYMPTOMS: No  BREAST SYMPTOMS: No  SKELETAL SYMPTOMS: No  BLOOD SYMPTOMS: No  NERVOUS SYSTEM SYMPTOMS: No  MENTAL HEALTH SYMPTOMS: No  Eye pain: No  Vision loss: No  Dry eyes: No  Watery eyes: No  Eye bulging: No  Double vision: Yes  Flashing of lights: No  Spots: No  Redness: No  Crossed eyes: No  Tunnel Vision: No  Yellowing of eyes: No      Again, thank you for allowing me to participate in the care of your patient.      Sincerely,    Erick Fernandez MD

## 2020-07-29 NOTE — PROGRESS NOTES
"Zahida Cespedes is a 80 year old female who is being evaluated via a billable video visit.      The patient has been notified of following:     \"This video visit will be conducted via a call between you and your physician/provider. We have found that certain health care needs can be provided without the need for an in-person physical exam.  This service lets us provide the care you need with a video conversation.  If a prescription is necessary we can send it directly to your pharmacy.  If lab work is needed we can place an order for that and you can then stop by our lab to have the test done at a later time.    Video visits are billed at different rates depending on your insurance coverage.  Please reach out to your insurance provider with any questions.    If during the course of the call the physician/provider feels a video visit is not appropriate, you will not be charged for this service.\"    Patient has given verbal consent for Video visit? Yes  How would you like to obtain your AVS? mychart  If you are dropped from the video visit, the video invite should be resent to: Answers for HPI/ROS submitted by the patient on 7/29/2020   General Symptoms: No  Skin Symptoms: No  HENT Symptoms: No  EYE SYMPTOMS: Yes  HEART SYMPTOMS: No  LUNG SYMPTOMS: No  INTESTINAL SYMPTOMS: No  URINARY SYMPTOMS: No  GYNECOLOGIC SYMPTOMS: No  BREAST SYMPTOMS: No  SKELETAL SYMPTOMS: No  BLOOD SYMPTOMS: No  NERVOUS SYSTEM SYMPTOMS: No  MENTAL HEALTH SYMPTOMS: No  Eye pain: No  Vision loss: No  Dry eyes: No  Watery eyes: No  Eye bulging: No  Double vision: Yes  Flashing of lights: No  Spots: No  Redness: No  Crossed eyes: No  Tunnel Vision: No  Yellowing of eyes: No  Will anyone else be joining your video visit? No        Video-Visit Details    Type of service:  Video Visit    Video Start Time: 11.50 am Answers for HPI/ROS submitted by the patient on 7/29/2020   General Symptoms: No  Skin Symptoms: No  HENT Symptoms: No  EYE SYMPTOMS: " Yes  HEART SYMPTOMS: No  LUNG SYMPTOMS: No  INTESTINAL SYMPTOMS: No  URINARY SYMPTOMS: No  GYNECOLOGIC SYMPTOMS: No  BREAST SYMPTOMS: No  SKELETAL SYMPTOMS: No  BLOOD SYMPTOMS: No  NERVOUS SYSTEM SYMPTOMS: No  MENTAL HEALTH SYMPTOMS: No  Eye pain: No  Vision loss: No  Dry eyes: No  Watery eyes: No  Eye bulging: No  Double vision: Yes  Flashing of lights: No  Spots: No  Redness: No  Crossed eyes: No  Tunnel Vision: No  Yellowing of eyes: No    Video End Time: 12:00 pm    Originating Location (pt. Location): Home    Distant Location (provider location):  [facility] NEUROLOGY     Platform used for Video Visit: Day Payan, EMT

## 2020-10-07 ENCOUNTER — MYC MEDICAL ADVICE (OUTPATIENT)
Dept: NEUROLOGY | Facility: CLINIC | Age: 80
End: 2020-10-07

## 2020-10-08 NOTE — TELEPHONE ENCOUNTER
Orders faxed to [facility].  They faxed therm back with complaint about the font and how it faxed.  Requested that their form be filled out, last md note and any labs we needed as well as last labs.  All of this was faxed to them at number on form.

## 2020-10-12 ENCOUNTER — MEDICAL CORRESPONDENCE (OUTPATIENT)
Dept: HEALTH INFORMATION MANAGEMENT | Facility: CLINIC | Age: 80
End: 2020-10-12

## 2020-11-03 DIAGNOSIS — G70.00 MG, OCULAR (MYASTHENIA GRAVIS) (H): ICD-10-CM

## 2020-11-04 RX ORDER — MYCOPHENOLATE MOFETIL 500 MG/1
TABLET ORAL
Qty: 60 TABLET | Refills: 2 | Status: SHIPPED | OUTPATIENT
Start: 2020-11-04 | End: 2021-01-22

## 2020-12-06 ENCOUNTER — HEALTH MAINTENANCE LETTER (OUTPATIENT)
Age: 80
End: 2020-12-06

## 2021-01-21 DIAGNOSIS — G70.00 MG, OCULAR (MYASTHENIA GRAVIS) (H): ICD-10-CM

## 2021-01-22 RX ORDER — MYCOPHENOLATE MOFETIL 500 MG/1
TABLET ORAL
Qty: 60 TABLET | Refills: 1 | Status: SHIPPED | OUTPATIENT
Start: 2021-01-22 | End: 2021-03-17

## 2021-02-10 ENCOUNTER — VIRTUAL VISIT (OUTPATIENT)
Dept: NEUROLOGY | Facility: CLINIC | Age: 81
End: 2021-02-10
Payer: MEDICARE

## 2021-02-10 DIAGNOSIS — Z79.60 LONG-TERM USE OF IMMUNOSUPPRESSANT MEDICATION: Primary | ICD-10-CM

## 2021-02-10 DIAGNOSIS — G70.00 OCULAR MYASTHENIA (H): ICD-10-CM

## 2021-02-10 PROCEDURE — 99213 OFFICE O/P EST LOW 20 MIN: CPT | Mod: 95 | Performed by: PSYCHIATRY & NEUROLOGY

## 2021-02-10 NOTE — PATIENT INSTRUCTIONS
Follow up 6 months  Cut down your Cellcept dose to 500 mg daily now, and continue this for 4 months (until 6/10/2021). At that point, if you are doing well you can stop the Cellcept  Labs (CBC, AST, ALT)

## 2021-02-10 NOTE — PROGRESS NOTES
Zahida is a 81 year old who is being evaluated via a billable video visit.      How would you like to obtain your AVS? MyChart  If the video visit is dropped, the invitation should be resent by: Send to e-mail at: [email or web address]  Will anyone else be joining your video visit? No      Video Start Time: 10.43 am  Video-Visit Details    Type of service:  Video Visit    Video End Time:10.50 am    Originating Location (pt. Location): Home    Distant Location (provider location):  [facility] NEUROLOGY [facility]     Platform used for Video Visit: Day [employer]an, EMT

## 2021-02-10 NOTE — LETTER
2/10/2021       RE: Zahida Cespedes  [address] [address]     Dear Colleague,    Thank you for referring your patient, Zahida Cespedes, to the [facility] NEUROLOGY CLINIC [place] at [facility]. Please see a copy of my visit note below.    Zahida is a 81 year old who is being evaluated via a billable video visit.      How would you like to obtain your AVS? MyChart  If the video visit is dropped, the invitation should be resent by: Send to e-mail at: [email or web address]  Will anyone else be joining your video visit? No      Video Start Time: 10.43 am  Video-Visit Details    Type of service:  Video Visit    Video End Time:10.50 am    Originating Location (pt. Location): Home    Distant Location (provider location):  [facility] NEUROLOGY [facility]     Platform used for Video Visit: ELI Ha        Service Date: 02/10/2021      [place] [address]      RE:    Zahida Cespedes   MRN:  [id number]   :  1940      Dear Dr. Lopez:        I had the pleasure to evaluate Zahida via billable video visit today.  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  She is on CellCept treatment. I last saw her, by virtual visit on 2020.  At that time she was doing well and I asked her to reduce her CellCept dose to 500 mg daily with plans to discontinue it around 2021 to 2021.  She called me in 10/2020 reporting reemergence of her diplopia in late 2020 in the setting of considerable stress with her family (her  was ill and she is his primary caregiver).  I also learned that her  had a fall and fractured his pelvis last week, which means that the stress continues.      When Zahida told me about this, I asked her to increase her CellCept to 500 mg b.i.d. again, but this did not  help and then she had to be treated with IVIG again.  She got 0.4 g/kg daily for 4 doses and this helped immensely even after the second dose.  Her double vision never returned and she is very satisfied with her course.        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  She has no other systemic symptoms such as weakness of arms or legs, shortness of breath on exertion, orthopnea, changes in her voice or speech, dysphagia, difficulty chewing, sialorrhea, head drop, etc.      The last labs on record were from 01/2020 when CBC, AST and ALT were within normal limits.      IMPRESSION:   In summary, Zahida seems to be doing very well now with her ocular myasthenia. Ater she got 4 doses of IVIG in 10/2020, there has been no recurrence of her symptoms.  I think that the reason for the one relatively minor exacerbation was severe stress and this can occur even in people with well-controlled autoimmune diseases.  Therefore, I think we will stick with the original plan about tapering of the CellCept.  I asked her to reduce the dose again to 500 mg daily now for the next 4 months and then she can stop it around 06/2021.        I will recheck CBC, AST and ALT now.  I will see her in 08/2021 for followup, which would be 2 months after she has stopped the CellCept.  Of course, if she has any new problems, she should call us.  IVIG has been historically very efficient for her symptoms and it works quickly, so we could reuse it if necessary.      Thank you for allowing me to participate in the care of Zahida.      Billing:       MDM level 4 (moderate) based on A) problems assessed: 1 stable chronic illness.   B) Risk:  Moderate because we are doing prescription drug management (adjusting doses of CellCept and monitoring with labs) and C) Amount of complexity: moderate, based on ordering 2 unique tests (CBC, AST, ALT) and review of the results from 2 unique tests, again CBC, AST and  ALT from last year.      Sincerely,                 JUAN DAVID IVY MD             D: 02/10/2021   T: 02/10/2021   MT: BOBY      Name:     TACOS LARES   MRN:      [id number]        Account:      [id number]   :      1940           Service Date: 02/10/2021      Document: [id number]

## 2021-02-10 NOTE — PROGRESS NOTES
Service Date: 02/10/2021      Micheline Lopez MD   [place] [id number]      RE:    Zahida Cespedes   MRN:  [id number]   :  1940      Dear Dr. Lopez:        I had the pleasure to evaluate Zahida via billable video visit today.  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  She is on CellCept treatment. I last saw her, by virtual visit on 2020.  At that time she was doing well and I asked her to reduce her CellCept dose to 500 mg daily with plans to discontinue it around 2021 to 2021.  She called me in 10/2020 reporting reemergence of her diplopia in late 2020 in the setting of considerable stress with her family (her  was ill and she is his primary caregiver).  I also learned that her  had a fall and fractured his pelvis last week, which means that the stress continues.      When Zahida told me about this, I asked her to increase her CellCept to 500 mg b.i.d. again, but this did not help and then she had to be treated with IVIG again.  She got 0.4 g/kg daily for 4 doses and this helped immensely even after the second dose.  Her double vision never returned and she is very satisfied with her course.        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  She has no other systemic symptoms such as weakness of arms or legs, shortness of breath on exertion, orthopnea, changes in her voice or speech, dysphagia, difficulty chewing, sialorrhea, head drop, etc.      The last labs on record were from 2020 when CBC, AST and ALT were within normal limits.      IMPRESSION:   In summary, Zahida seems to be doing very well now with her ocular myasthenia. Ater she got 4 doses of IVIG in 10/2020, there has been no recurrence of her symptoms.  I think that the reason for the one relatively minor exacerbation was severe stress and this can occur even in  people with well-controlled autoimmune diseases.  Therefore, I think we will stick with the original plan about tapering of the CellCept.  I asked her to reduce the dose again to 500 mg daily now for the next 4 months and then she can stop it around 2021.        I will recheck CBC, AST and ALT now.  I will see her in 2021 for followup, which would be 2 months after she has stopped the CellCept.  Of course, if she has any new problems, she should call us.  IVIG has been historically very efficient for her symptoms and it works quickly, so we could reuse it if necessary.      Thank you for allowing me to participate in the care of Tacos.      Billing:       MDM level 4 (moderate) based on A) problems assessed: 1 stable chronic illness.   B) Risk:  Moderate because we are doing prescription drug management (adjusting doses of CellCept and monitoring with labs) and C) Amount of complexity: moderate, based on ordering 2 unique tests (CBC, AST, ALT) and review of the results from 2 unique tests, again CBC, AST and ALT from last year.      Sincerely,         MD JUAN DAVID Faith MD             D: 02/10/2021   T: 02/10/2021   MT: BOBY      Name:     TACOS LARES   MRN:      [id number]        Account:      [id number]   :      1940           Service Date: 02/10/2021      Document: [id number]

## 2021-03-16 DIAGNOSIS — G70.00 MG, OCULAR (MYASTHENIA GRAVIS) (H): ICD-10-CM

## 2021-03-17 DIAGNOSIS — G70.00 MG, OCULAR (MYASTHENIA GRAVIS) (H): ICD-10-CM

## 2021-03-18 RX ORDER — MYCOPHENOLATE MOFETIL 500 MG/1
TABLET ORAL
Qty: 30 TABLET | Refills: 3 | Status: SHIPPED | OUTPATIENT
Start: 2021-03-18 | End: 2024-07-22

## 2021-03-19 RX ORDER — MYCOPHENOLATE MOFETIL 500 MG/1
TABLET ORAL
Qty: 60 TABLET | Refills: 1 | OUTPATIENT
Start: 2021-03-19

## 2021-04-11 ENCOUNTER — HEALTH MAINTENANCE LETTER (OUTPATIENT)
Age: 81
End: 2021-04-11

## 2021-08-27 ENCOUNTER — TRANSFERRED RECORDS (OUTPATIENT)
Dept: HEALTH INFORMATION MANAGEMENT | Facility: CLINIC | Age: 81
End: 2021-08-27

## 2021-09-25 ENCOUNTER — HEALTH MAINTENANCE LETTER (OUTPATIENT)
Age: 81
End: 2021-09-25

## 2021-10-22 ENCOUNTER — TRANSFERRED RECORDS (OUTPATIENT)
Dept: HEALTH INFORMATION MANAGEMENT | Facility: CLINIC | Age: 81
End: 2021-10-22
Payer: COMMERCIAL

## 2021-11-11 ENCOUNTER — VIRTUAL VISIT (OUTPATIENT)
Dept: NEUROLOGY | Facility: CLINIC | Age: 81
End: 2021-11-11
Payer: MEDICARE

## 2021-11-11 DIAGNOSIS — G70.00 MG, OCULAR (MYASTHENIA GRAVIS) (H): Primary | ICD-10-CM

## 2021-11-11 PROCEDURE — 99214 OFFICE O/P EST MOD 30 MIN: CPT | Mod: 95 | Performed by: PSYCHIATRY & NEUROLOGY

## 2021-11-11 RX ORDER — FLUOROURACIL 50 MG/G
1 CREAM TOPICAL SEE ADMIN INSTRUCTIONS
COMMUNITY
Start: 2021-09-01 | End: 2024-07-22

## 2021-11-11 RX ORDER — IPRATROPIUM BROMIDE 21 UG/1
2 SPRAY, METERED NASAL 3 TIMES DAILY PRN
COMMUNITY
Start: 2021-07-31

## 2021-11-11 NOTE — LETTER
2021       RE: Zahida Cespedes  [address] [address]     Dear Colleague,    Thank you for referring your patient, Zahida Cespedes, to the [facility] NEUROLOGY CLINIC [place] at [facility]. Please see a copy of my visit note below.    Zahida is a 81 year old who is being evaluated via a billable video visit.      How would you like to obtain your AVS? Mychart  If the video visit is dropped, the invitation should be resent by: [phone]      Video Start Time: 10.26 am  Video-Visit Details    Type of service:  Video Visit    Video End Time:10.35 am    Originating Location (pt. Location): Home    Distant Location (provider location):  [facility] NEUROLOGY CLINIC [place]     Platform used for Video Visit: [employer]    Service Date: 2021      Micheline Lopez MD   [place] [id number]      RE:      Zahida Cespedes   MRN:   [id number]   :   1940      Dear Dr. Lopez:        I had the pleasure to evaluate Zahida via billable video visit today.  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  In the past she responded well to IVIG but not to steroids or pyridostigmine. She used to be on Cellcept treatment; because of clinical remission we gradually tapered it in  and , and ultimately stopped it in 2021. However, in 2021 Zahida contacted us and reported that her diplopia had reemerged, when gazing downwards, and so did a mild right eyelid ptosis. She attributed it to the stress of taking care of her . Putting an ice pack a couple of times per day was not enough to treat the ptosis, so I decided to put her back on treatment. She was re-loaded with IVIG 2 g/kg over 5 days in late 2021 and it definitely helped. I decided to continue her on monthly treatment until her follow up appointment today. I also  "restarted Cellcept at 500 mg bid in 9/2021. She had labs in 10/22/2021 (AST, ALT, CBC) which were normal.   She now reports no ptosis. She continues with intermittent diplopia upon downgaze, occurring only after exerting herself and doing a lot of work. The diplopia usually emerges in the afternoon and it is absent in the morning when she wakes up. She has no other symptoms to suggest generalized MG. She thinks this degree of diplopia is tolerable and does not limit her ADLs- she can \"live\" with it.     IMPRESSION:   In summary, Zahida probably requires some long term immunotherapy for her MG because her ocular symptoms appear to re-emerge frequently under stress. However, given the fact that she is only experiencing mild diplopia now that does not limit her activities, there is a fine balance between the need to continue those immunotherapies and the costs and risks associated with them. I advised her to get IVIG infusions again in 11/2021 and 12/2021, but I would not continue the IVIG next year unless her ocular symptoms are markedly worse. As for the Cellcept, it is my opinion it should be continued long-term at the lowest dose possible. For now will continue 500 mg bid and I asked patient to get CBC again in 1/2022 and 4/2022; I would appreciate if her PCP could order those tests. I will see her again at 6 months and if she continues to be doing well, will reduce Cellcept to the lowest dose of 500 mg daily.     Thank you for allowing me to participate in the care of Zahida.      Billing: MDM level 4 (moderate) based on A) problems assessed: 1 stable chronic illness.   B) Risk:  Moderate because we are doing prescription drug management (CellCept, and monitoring with labs) and C) Amount of complexity: moderate, based on review of the results from 3 unique tests on 10/22/2021 (CBC, AST, ALT).     Sincerely,         MD JUAN DAVID Faith MD        Again, thank you for allowing me to " participate in the care of your patient.      Sincerely,    Erick Fernandez MD

## 2021-11-11 NOTE — LETTER
2021      RE: Zah[place] Cespedes  [address] [address] [address]       Zahida is a 81 year old who is being evaluated via a billable video visit.      How would you like to obtain your AVS? Mychart  If the video visit is dropped, the invitation should be resent by: [phone]      Video Start Time: 10.26 am  Video-Visit Details    Type of service:  Video Visit    Video End Time:10.35 am    Originating Location (pt. Location): Home    Distant Location (provider location):  [facility] NEUROLOGY CLINIC [place]     Platform used for Video Visit: [employer]    Service Date: 2021      Micheline Lopez MD   [place] [id number]      RE:      Zahida Cespedes   MRN:   [id number]   :   1940      Dear Dr. Lopez:        I had the pleasure to evaluate Zahida via billable video visit today.  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  In the past she responded well to IVIG but not to steroids or pyridostigmine. She used to be on Cellcept treatment; because of clinical remission we gradually tapered it in  and , and ultimately stopped it in 2021. However, in 2021 Zahida contacted us and reported that her diplopia had reemerged, when gazing downwards, and so did a mild right eyelid ptosis. She attributed it to the stress of taking care of her . Putting an ice pack a couple of times per day was not enough to treat the ptosis, so I decided to put her back on treatment. She was re-loaded with IVIG 2 g/kg over 5 days in late 2021 and it definitely helped. I decided to continue her on monthly treatment until her follow up appointment today. I also restarted Cellcept at 500 mg bid in 2021. She had labs in 10/22/2021 (AST, ALT, CBC) which were normal.   She now reports no ptosis. She continues with intermittent diplopia upon downgaze, occurring only after exerting herself and doing  "a lot of work. The diplopia usually emerges in the afternoon and it is absent in the morning when she wakes up. She has no other symptoms to suggest generalized MG. She thinks this degree of diplopia is tolerable and does not limit her ADLs- she can \"live\" with it.     IMPRESSION:   In summary, Zahida probably requires some long term immunotherapy for her MG because her ocular symptoms appear to re-emerge frequently under stress. However, given the fact that she is only experiencing mild diplopia now that does not limit her activities, there is a fine balance between the need to continue those immunotherapies and the costs and risks associated with them. I advised her to get IVIG infusions again in 11/2021 and 12/2021, but I would not continue the IVIG next year unless her ocular symptoms are markedly worse. As for the Cellcept, it is my opinion it should be continued long-term at the lowest dose possible. For now will continue 500 mg bid and I asked patient to get CBC again in 1/2022 and 4/2022; I would appreciate if her PCP could order those tests. I will see her again at 6 months and if she continues to be doing well, will reduce Cellcept to the lowest dose of 500 mg daily.     Thank you for allowing me to participate in the care of Zahida.      Billing: MDM level 4 (moderate) based on A) problems assessed: 1 stable chronic illness.   B) Risk:  Moderate because we are doing prescription drug management (CellCept, and monitoring with labs) and C) Amount of complexity: moderate, based on review of the results from 3 unique tests on 10/22/2021 (CBC, AST, ALT).     Sincerely,         MD Erick Faith MD    "

## 2021-11-11 NOTE — PATIENT INSTRUCTIONS
Continue IVIG on November and December-I would stop it after that  Continue Cellcept 500 mg twice a day. Please ask your primary care doctor to order a CBC for 1/2022 and 4/2022    Follow up 6 months

## 2021-11-11 NOTE — PROGRESS NOTES
Zahida is a 81 year old who is being evaluated via a billable video visit.      How would you like to obtain your AVS? Mychart  If the video visit is dropped, the invitation should be resent by: [phone]      Video Start Time: 10.26 am  Video-Visit Details    Type of service:  Video Visit    Video End Time:10.35 am    Originating Location (pt. Location): Home    Distant Location (provider location):  [facility] NEUROLOGY [facility]     Platform used for Video Visit: [employer]    Service Date: 2021      [place] [address]      RE:      Zahida Cespedes   MRN:   [id number]   :   1940      Dear Dr. Lopez:        I had the pleasure to evaluate Zahida via billable video visit today.  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  In the past she responded well to IVIG but not to steroids or pyridostigmine. She used to be on Cellcept treatment; because of clinical remission we gradually tapered it in  and , and ultimately stopped it in 2021. However, in 2021 Zahida contacted us and reported that her diplopia had reemerged, when gazing downwards, and so did a mild right eyelid ptosis. She attributed it to the stress of taking care of her . Putting an ice pack a couple of times per day was not enough to treat the ptosis, so I decided to put her back on treatment. She was re-loaded with IVIG 2 g/kg over 5 days in late 2021 and it definitely helped. I decided to continue her on monthly treatment until her follow up appointment today. I also restarted Cellcept at 500 mg bid in 2021. She had labs in 10/22/2021 (AST, ALT, CBC) which were normal.   She now reports no ptosis. She continues with intermittent diplopia upon downgaze, occurring only after exerting herself and doing a lot of work. The diplopia usually emerges in the afternoon and it is absent in the  "morning when she wakes up. She has no other symptoms to suggest generalized MG. She thinks this degree of diplopia is tolerable and does not limit her ADLs- she can \"live\" with it.     IMPRESSION:   In summary, Zahida probably requires some long term immunotherapy for her MG because her ocular symptoms appear to re-emerge frequently under stress. However, given the fact that she is only experiencing mild diplopia now that does not limit her activities, there is a fine balance between the need to continue those immunotherapies and the costs and risks associated with them. I advised her to get IVIG infusions again in 11/2021 and 12/2021, but I would not continue the IVIG next year unless her ocular symptoms are markedly worse. As for the Cellcept, it is my opinion it should be continued long-term at the lowest dose possible. For now will continue 500 mg bid and I asked patient to get CBC again in 1/2022 and 4/2022; I would appreciate if her PCP could order those tests. I will see her again at 6 months and if she continues to be doing well, will reduce Cellcept to the lowest dose of 500 mg daily.     Thank you for allowing me to participate in the care of Zahida.      Billing: MDM level 4 (moderate) based on A) problems assessed: 1 stable chronic illness.   B) Risk:  Moderate because we are doing prescription drug management (CellCept, and monitoring with labs) and C) Amount of complexity: moderate, based on review of the results from 3 unique tests on 10/22/2021 (CBC, AST, ALT).     Sincerely,         MD JUAN DAVID Faith MD    "

## 2021-11-30 DIAGNOSIS — G70.00 OCULAR MYASTHENIA (H): Primary | ICD-10-CM

## 2021-11-30 RX ORDER — MYCOPHENOLATE MOFETIL 500 MG/1
TABLET ORAL
Qty: 60 TABLET | Refills: 2 | Status: SHIPPED | OUTPATIENT
Start: 2021-11-30 | End: 2022-02-14

## 2022-01-15 ENCOUNTER — HEALTH MAINTENANCE LETTER (OUTPATIENT)
Age: 82
End: 2022-01-15

## 2022-02-14 DIAGNOSIS — G70.00 OCULAR MYASTHENIA (H): ICD-10-CM

## 2022-02-14 RX ORDER — MYCOPHENOLATE MOFETIL 500 MG/1
TABLET ORAL
Qty: 60 TABLET | Refills: 2 | Status: SHIPPED | OUTPATIENT
Start: 2022-02-14 | End: 2022-05-06

## 2022-05-05 DIAGNOSIS — G70.00 OCULAR MYASTHENIA (H): ICD-10-CM

## 2022-05-06 RX ORDER — MYCOPHENOLATE MOFETIL 500 MG/1
TABLET ORAL
Qty: 60 TABLET | Refills: 2 | Status: SHIPPED | OUTPATIENT
Start: 2022-05-06 | End: 2022-08-01

## 2022-05-07 ENCOUNTER — HEALTH MAINTENANCE LETTER (OUTPATIENT)
Age: 82
End: 2022-05-07

## 2022-05-18 ENCOUNTER — VIRTUAL VISIT (OUTPATIENT)
Dept: NEUROLOGY | Facility: CLINIC | Age: 82
End: 2022-05-18
Payer: MEDICARE

## 2022-05-18 DIAGNOSIS — Z79.60 LONG-TERM USE OF IMMUNOSUPPRESSANT MEDICATION: Primary | ICD-10-CM

## 2022-05-18 DIAGNOSIS — G70.00 OCULAR MYASTHENIA (H): ICD-10-CM

## 2022-05-18 PROCEDURE — 99213 OFFICE O/P EST LOW 20 MIN: CPT | Mod: 95 | Performed by: PSYCHIATRY & NEUROLOGY

## 2022-05-18 NOTE — LETTER
2022       RE: Zahida Cespedes  [address] [facility] [address]     Dear Colleague,    Thank you for referring your patient, Zahida Cespedes, to the [facility] NEUROLOGY CLINIC [place] at [facility]. Please see a copy of my visit note below.      Service Date: 2022     Micheline Lopez MD   [address] [address]      RE:      Zahida Cespedes   MRN:   [id number]   :   1940      Dear Dr. Lopez:        I had the pleasure to evaluate Zahida via billable video visit today.  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  In the past she responded well to IVIG but not to steroids or pyridostigmine. She is now well controlled on Cellcept only 500 mg bid. When we stopped Cellcept completely in 2021 she had reemergence of her diplopia requiring re-treatment with IVIG for a few months. Last IVIG dose was in 2021. She is now doing well. She has no ptosis and infrequent diplopia when gazing downwards in the evening or when she is tired. No generalized MG symptoms like dysphagia, dysarthria, dysphonia, difficulty chewing, SOB on exertion, weakness of arms or legs were reported. She gets skin exams q6 months by a dermatologist due to history of melanoma. Last CBC, AST and ALT were done 10/21/2021 (all normal).     IMPRESSION:   In summary, Zahida is doing well with her ocular MG. I think she should continue a very low dose Cellcept long-term. We agreed to continue 500 mg daily until next appointment. I ordered a repeat CBC and this should be done if she has not had any lab draws in the last 3 months. She should continue with her regular skin exams and annual cancer screen per primary care guidelines, because long term MMF use can increase the risk of malignancy and she understands this concept well.     Thank you for allowing me to participate in  the care of Zahida.      Billing: MDM level 3 (low) based on A) problems assessed: 1 stable chronic illness.   B) Risk:  Moderate because we are doing prescription drug management (CellCept, and monitoring with labs).       Sincerely,      Erick Fernandez MD

## 2022-05-18 NOTE — PROGRESS NOTES
Zahida is a 82 year old who is being evaluated via a billable video visit.      How would you like to obtain your AVS? MyChart  If the video visit is dropped, the invitation should be resent by: Send to e-mail at: [email or web address]  Will anyone else be joining your video visit? No      Video Start Time: 10.24 am  Video-Visit Details    Type of service:  Video Visit    Video End Time:10.28 am    Originating Location (pt. Location): Home    Distant Location (provider location):  [facility] NEUROLOGY [facility]     Platform used for Video Visit: Coupz      Service Date: 2022     [place] [address]      RE:      Zahida Cespedes   MRN:   [id number]   :   1940      Dear Dr. Lopez:        I had the pleasure to evaluate Zahida via billable video visit today.  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  In the past she responded well to IVIG but not to steroids or pyridostigmine. She is now well controlled on Cellcept only 500 mg bid. When we stopped Cellcept completely in 2021 she had reemergence of her diplopia requiring re-treatment with IVIG for a few months. Last IVIG dose was in 2021. She is now doing well. She has no ptosis and infrequent diplopia when gazing downwards in the evening or when she is tired. No generalized MG symptoms like dysphagia, dysarthria, dysphonia, difficulty chewing, SOB on exertion, weakness of arms or legs were reported. She gets skin exams q6 months by a dermatologist due to history of melanoma. Last CBC, AST and ALT were done 10/21/2021 (all normal).     IMPRESSION:   In summary, Zahida is doing well with her ocular MG. I think she should continue a very low dose Cellcept long-term. We agreed to continue 500 mg daily until next appointment. I ordered a repeat CBC and this should be done if she has not had any lab draws in the last 3 months.  She should continue with her regular skin exams and annual cancer screen per primary care guidelines, because long term MMF use can increase the risk of malignancy and she understands this concept well.     Thank you for allowing me to participate in the care of Zahida.      Billing: MDM level 3 (low) based on A) problems assessed: 1 stable chronic illness.   B) Risk:  Moderate because we are doing prescription drug management (CellCept, and monitoring with labs).     Sincerely,         MD JUAN DAVID Faith MD

## 2022-05-18 NOTE — PATIENT INSTRUCTIONS
Check a blood count again if you haven't had any in the past 3 months  Follow up 6 months  Continue 1 pill a day of Cellcept  Follow up with the skin doctors every 6 months

## 2022-08-01 DIAGNOSIS — G70.00 OCULAR MYASTHENIA (H): ICD-10-CM

## 2022-08-01 RX ORDER — MYCOPHENOLATE MOFETIL 500 MG/1
TABLET ORAL
Qty: 60 TABLET | Refills: 0 | Status: SHIPPED | OUTPATIENT
Start: 2022-08-01 | End: 2022-09-19

## 2022-09-19 DIAGNOSIS — G70.00 OCULAR MYASTHENIA (H): ICD-10-CM

## 2022-09-19 RX ORDER — MYCOPHENOLATE MOFETIL 500 MG/1
TABLET ORAL
Qty: 60 TABLET | Refills: 0 | Status: SHIPPED | OUTPATIENT
Start: 2022-09-19 | End: 2022-10-17

## 2022-10-15 DIAGNOSIS — G70.00 OCULAR MYASTHENIA (H): ICD-10-CM

## 2022-10-17 RX ORDER — MYCOPHENOLATE MOFETIL 500 MG/1
TABLET ORAL
Qty: 60 TABLET | Refills: 0 | Status: SHIPPED | OUTPATIENT
Start: 2022-10-17 | End: 2022-11-14

## 2022-11-12 DIAGNOSIS — G70.00 OCULAR MYASTHENIA (H): ICD-10-CM

## 2022-11-14 ENCOUNTER — VIRTUAL VISIT (OUTPATIENT)
Dept: NEUROLOGY | Facility: CLINIC | Age: 82
End: 2022-11-14
Payer: MEDICARE

## 2022-11-14 DIAGNOSIS — G70.00 MG, OCULAR (MYASTHENIA GRAVIS) (H): Primary | ICD-10-CM

## 2022-11-14 PROCEDURE — 99213 OFFICE O/P EST LOW 20 MIN: CPT | Mod: 95 | Performed by: PSYCHIATRY & NEUROLOGY

## 2022-11-14 RX ORDER — MYCOPHENOLATE MOFETIL 500 MG/1
TABLET ORAL
Qty: 60 TABLET | Refills: 0 | Status: SHIPPED | OUTPATIENT
Start: 2022-11-14 | End: 2022-12-12

## 2022-11-14 NOTE — LETTER
2022       RE: Zahida Cespedes  [address] [facility] [address]     Dear Colleague,    Thank you for referring your patient, Zahida Cespedes, to the [facility] NEUROLOGY CLINIC [place] at [facility]. Please see a copy of my visit note below.    Service Date: 2022     Micheline Lopez MD   [address] [address]      RE:      Zahida eCspedes   MRN:   [id number]   :   1940      Dear Dr. Lopez:        I had the pleasure to evaluate Zahida via billable video visit today.  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  In the past she responded well to IVIG but not to steroids or pyridostigmine. She is now well controlled on Cellcept only 500 mg daily. When we stopped Cellcept completely in 2021 she had reemergence of her diplopia requiring re-treatment with IVIG for a few months. Last IVIG dose was in 2021. She is now doing well. She has no ptosis or diplopia. She has somewhat blurry vision from the right eye which she attributes to recently diagnosed cataracts; this is followed by Ophthalmology. No generalized MG symptoms like dysphagia, dysarthria, dysphonia, difficulty chewing, SOB on exertion, weakness of arms or legs were reported. She gets skin exams q6 months by a dermatologist due to history of melanoma. Last CBC was done 2022; I reviewed it and it is normal.      IMPRESSION:   In summary, Zahida is doing well with her ocular MG. I think she should continue a very low dose Cellcept of 500 mg daily long-term. She should continue with her regular skin exams and annual cancer screen per primary care guidelines, because long term MMF use can increase the risk of malignancy and she understands this concept well. I think that given several normal CBCs and very low dose Cellcept, we can do lab tests (CBC) every 6 months now as opposed  to every 3; she will ask her primary care doctor to order one around 5/2022     Thank you for allowing me to participate in the care of Zahida.      Billing: MDM level 3 (low) based on A) problems assessed: 1 stable chronic illness.   B) Risk:  Moderate because we are doing prescription drug management (CellCept, and monitoring with labs).            Again, thank you for allowing me to participate in the care of your patient.      Sincerely,    Erick Fernandez MD

## 2022-11-14 NOTE — PROGRESS NOTES
Service Date: 2022     Micheline Lopez MD   [place] [id number]      RE:      Zahida Cespedes   MRN:   [id number]   :   1940      Dear Dr. Lopez:        I had the pleasure to evaluate Zahida via billable video visit today.  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  In the past she responded well to IVIG but not to steroids or pyridostigmine. She is now well controlled on Cellcept only 500 mg daily. When we stopped Cellcept completely in 2021 she had reemergence of her diplopia requiring re-treatment with IVIG for a few months. Last IVIG dose was in 2021. She is now doing well. She has no ptosis or diplopia. She has somewhat blurry vision from the right eye which she attributes to recently diagnosed cataracts; this is followed by Ophthalmology. No generalized MG symptoms like dysphagia, dysarthria, dysphonia, difficulty chewing, SOB on exertion, weakness of arms or legs were reported. She gets skin exams q6 months by a dermatologist due to history of melanoma. Last CBC was done 2022; I reviewed it and it is normal.      IMPRESSION:   In summary, Zahida is doing well with her ocular MG. I think she should continue a very low dose Cellcept of 500 mg daily long-term. She should continue with her regular skin exams and annual cancer screen per primary care guidelines, because long term MMF use can increase the risk of malignancy and she understands this concept well. I think that given several normal CBCs and very low dose Cellcept, we can do lab tests (CBC) every 6 months now as opposed to every 3; she will ask her primary care doctor to order one around 2022     Thank you for allowing me to participate in the care of Zahida.      Billing: MDM level 3 (low) based on A) problems assessed: 1 stable chronic illness.   B) Risk:  Moderate because we are doing prescription drug management (CellCept, and  monitoring with labs).     Sincerely,         MD JUAN DAVID Faith MD

## 2022-12-12 DIAGNOSIS — G70.00 OCULAR MYASTHENIA (H): ICD-10-CM

## 2022-12-12 RX ORDER — MYCOPHENOLATE MOFETIL 500 MG/1
TABLET ORAL
Qty: 60 TABLET | Refills: 0 | Status: SHIPPED | OUTPATIENT
Start: 2022-12-12 | End: 2024-07-22

## 2023-01-07 DIAGNOSIS — G70.00 OCULAR MYASTHENIA (H): ICD-10-CM

## 2023-01-09 RX ORDER — MYCOPHENOLATE MOFETIL 500 MG/1
TABLET ORAL
Qty: 60 TABLET | Refills: 0 | OUTPATIENT
Start: 2023-01-09

## 2023-01-12 RX ORDER — MYCOPHENOLATE MOFETIL 500 MG/1
500 TABLET ORAL 2 TIMES DAILY
Qty: 180 TABLET | Refills: 1 | Status: SHIPPED | OUTPATIENT
Start: 2023-01-12 | End: 2024-03-14

## 2023-01-12 NOTE — TELEPHONE ENCOUNTER
[facility] Call Center    Phone Message    May a detailed message be left on voicemail: yes     Reason for Call: Medication Refill Request    Has the patient contacted the pharmacy for the refill? Yes   Name of medication being requested: mycophenolate (GENERIC EQUIVALENT) 500 MG tablet  Provider who prescribed the medication: Dr. Fernandez  Pharmacy: [facility] #[id number] - [facility] [place] - [address]  Date medication is needed: ASAP as was ordered on 1/7/23 and denied. Last fill on 12/12/22 was only for 30 days.         Action Taken: Message routed to:  [facility] Neurology    Travel Screening: Not Applicable

## 2023-03-09 ENCOUNTER — TELEPHONE (OUTPATIENT)
Dept: NEUROLOGY | Facility: CLINIC | Age: 83
End: 2023-03-09
Payer: MEDICARE

## 2023-03-09 NOTE — TELEPHONE ENCOUNTER
Health Call Center    Phone Message    May a detailed message be left on voicemail: no     Reason for Call: Other: Writer scheduled Zahida for 11/13/23 as an in clinic visit due to not receiving a template for video visit using RETURN MYASTHENIA GRAVIS visit type.  However, in Zahida's MyChart request, she was wanting to do video visit. Please call Zahida at your earliest convenience to discuss if this can be changed to a video visit.    Action Taken: Message routed to:  [facility]    Travel Screening: Not Applicable

## 2023-11-13 ENCOUNTER — VIRTUAL VISIT (OUTPATIENT)
Dept: NEUROLOGY | Facility: CLINIC | Age: 83
End: 2023-11-13
Payer: MEDICARE

## 2023-11-13 VITALS — WEIGHT: 116 LBS | BODY MASS INDEX: 18.64 KG/M2 | HEIGHT: 66 IN

## 2023-11-13 DIAGNOSIS — G70.00 MYASTHENIA GRAVIS IN REMISSION (H): Primary | ICD-10-CM

## 2023-11-13 PROCEDURE — 99213 OFFICE O/P EST LOW 20 MIN: CPT | Mod: 95 | Performed by: PSYCHIATRY & NEUROLOGY

## 2023-11-13 RX ORDER — FINASTERIDE 5 MG/1
5 TABLET, FILM COATED ORAL EVERY MORNING
COMMUNITY
Start: 2022-04-04

## 2023-11-13 RX ORDER — ESTRADIOL 0.1 MG/G
CREAM VAGINAL
COMMUNITY
Start: 2023-10-09

## 2023-11-13 ASSESSMENT — PAIN SCALES - GENERAL: PAINLEVEL: NO PAIN (0)

## 2023-11-13 NOTE — PROGRESS NOTES
Virtual Visit Details    Type of service:  Video Visit   Video Start Time:  12.25 pm  Video End Time: 12.30 pm    Originating Location (pt. Location): Home    Distant Location (provider location):  On-site  Platform used for Video Visit: Betty Canela P.A.-C.   [address] [facility] [place] [address]     [place], November 13, 2023    Dear Ms Neff,    I had the pleasure to evaluate Zahida via billable video visit today.  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  In the past she responded well to IVIG but not to steroids or pyridostigmine. She is now well controlled on Cellcept only 500 mg daily. When we stopped Cellcept completely in 6/2021 she had reemergence of her diplopia requiring re-treatment with IVIG for a few months. Last IVIG dose was in 12/2021. She is now doing well. She has no ptosis or diplopia No generalized MG symptoms like dysphagia, dysarthria, dysphonia, difficulty chewing, SOB on exertion, weakness of arms or legs were reported. She gets skin exams q6 months by a dermatologist due to history of melanoma. Last CBC was done 11/2023, 3 days ago; I reviewed it and it is normal.      IMPRESSION:   In summary, Zahida is doing well with her ocular MG. I think she should continue a very low dose Cellcept of 500 mg daily long-term. She should continue with her regular skin exams and annual cancer screen per primary care guidelines, because long term MMF use can increase the risk of malignancy and she understands this concept well. I recommend checking CBC every 6 months from here on, which can be ordered by her PCP.     Follow up in 1 year, sooner if needed.     Billing: MDM level 3 (low) based on A) problems assessed: 1 stable chronic illness.   B) Risk: Moderate because we are doing prescription drug management (CellCept, and monitoring with labs).     Sincerely,         Erick Fernandez MD

## 2023-11-13 NOTE — LETTER
11/13/2023       RE: Zahida Cespedes  [address] [facility]  [place] [address]       Dear Colleague,    Thank you for referring your patient, Zahida Cespedes, to the [facility] NEUROLOGY CLINIC [place] at [facility]. Please see a copy of my visit note below.    Betty Neff P.A.-C.   [address] [place] [address]     [place], November 13, 2023    Dear Ms Neff,    I had the pleasure to evaluate Zahida via billable video visit today.  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  In the past she responded well to IVIG but not to steroids or pyridostigmine. She is now well controlled on Cellcept only 500 mg daily. When we stopped Cellcept completely in 6/2021 she had reemergence of her diplopia requiring re-treatment with IVIG for a few months. Last IVIG dose was in 12/2021. She is now doing well. She has no ptosis or diplopia No generalized MG symptoms like dysphagia, dysarthria, dysphonia, difficulty chewing, SOB on exertion, weakness of arms or legs were reported. She gets skin exams q6 months by a dermatologist due to history of melanoma. Last CBC was done 11/2023, 3 days ago; I reviewed it and it is normal.      IMPRESSION:   In summary, Zahida is doing well with her ocular MG. I think she should continue a very low dose Cellcept of 500 mg daily long-term. She should continue with her regular skin exams and annual cancer screen per primary care guidelines, because long term MMF use can increase the risk of malignancy and she understands this concept well. I recommend checking CBC every 6 months from here on, which can be ordered by her PCP.     Follow up in 1 year, sooner if needed.     Billing: MDM level 3 (low) based on A) problems assessed: 1 stable chronic illness.   B) Risk: Moderate because we are doing prescription drug management (CellCept, and  monitoring with labs).           Again, thank you for allowing me to participate in the care of your patient.      Sincerely,    Erick Fernandez MD

## 2023-11-13 NOTE — NURSING NOTE
Is the patient currently in the state of [place]? YES    Visit mode:VIDEO    If the visit is dropped, the patient can be reconnected by: VIDEO VISIT: Send to e-mail at: [email or web address]    Will anyone else be joining the visit? NO  (If patient encounters technical issues they should call [phone])    How would you like to obtain your AVS? MyChart    Are changes needed to the allergy or medication list? No    Reason for visit: Follow Up    Ana María SILVER

## 2023-12-02 ENCOUNTER — HEALTH MAINTENANCE LETTER (OUTPATIENT)
Age: 83
End: 2023-12-02

## 2024-03-14 ENCOUNTER — MYC MEDICAL ADVICE (OUTPATIENT)
Dept: NEUROLOGY | Facility: CLINIC | Age: 84
End: 2024-03-14
Payer: MEDICARE

## 2024-03-14 DIAGNOSIS — G70.00 OCULAR MYASTHENIA (H): ICD-10-CM

## 2024-03-14 RX ORDER — MYCOPHENOLATE MOFETIL 500 MG/1
500 TABLET ORAL DAILY
Qty: 90 TABLET | Refills: 3 | Status: SHIPPED | OUTPATIENT
Start: 2024-03-14

## 2024-06-25 ENCOUNTER — PATIENT OUTREACH (OUTPATIENT)
Dept: ONCOLOGY | Facility: CLINIC | Age: 84
End: 2024-06-25
Payer: MEDICARE

## 2024-06-25 ENCOUNTER — PRE VISIT (OUTPATIENT)
Dept: ONCOLOGY | Facility: CLINIC | Age: 84
End: 2024-06-25
Payer: MEDICARE

## 2024-06-25 DIAGNOSIS — D49.89 ANTERIOR MEDIASTINAL TUMOR: Primary | ICD-10-CM

## 2024-06-25 NOTE — PROGRESS NOTES
New Patient Oncology Nurse Navigator Note     Referring provider: Dr. 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 Consider additional CT follow-up to evaluate for additional regression over stability.    06/21/2024 MR Breast bilateral (care everywhere) showed:   Impression    1.  No MRI findings of breast malignancy.  2.  Enhancing 31 mm anterior mediastinal mass. Differential considerations would include a vascular lesion such as an aneurysm, a thymoma, or other soft tissue malignancy. Recommend CT chest for further evaluation.    06/24/2024 My Chart messages discussed referral to Thoracic Surgery.    Clinical Assessment / Barriers to Care (Per Nurse):  Never smoker  Records Location: [facility] Everywhere   Records Needed: Outside MR images from 06/21/24 from [employer], outside CT chest images from 11/01/2017 and 02/28/2017 from [employer]  Additional testing needed prior to consult: CT Chest, PFTs  Referral updates and Plan:     6/26: Writer called Zahida and discussed the referral with her. She confirmed she is aware of the referral and is ready to schedule. Writer discussed PFTs that are needed prior to consult. Zahida requested [place] as her preferred location. All questions were answered. Her call was transferred to NPS to schedule.     JOSE AsencioN, RN, OCN  [facility] Oncology Nurse Navigator  [phone] / [phone]

## 2024-06-25 NOTE — TELEPHONE ENCOUNTER
RECORDS STATUS - ALL OTHER DIAGNOSIS      RECORDS RECEIVED FROM: [facility]    Appt Date: TBD NN WQ     Anterior mediastinal tumor     Records Requested     June 25, 2024 2:42 PM  [id number]   Facility  [facility]    Outcome I emailed a request for imaging to [facility]       NOTES STATUS DETAILS   OFFICE NOTE from referring provider Vickey Fernandez: 11/13/23   OFFICE NOTE from medical oncologist Vickey Ferguson: 5/18/09   OPERATIVE REPORT CE - [facility] 11/15/17, 7/13/17: Colonoscopy   MEDICATION LIST Epic/CE    LABS     ANYTHING RELATED TO DIAGNOSIS Epic/CE 6/7/24   GENONOMIC TESTING     TYPE: CE - [facility] 10/7/23: [facility]   IMAGING (NEED IMAGES & REPORT)     CT SCANS PACS 11/1/17, 2/28/17: [facility]   MRI PACS 6/21/24, 12/17/21: [facility]

## 2024-07-03 ENCOUNTER — HOSPITAL ENCOUNTER (OUTPATIENT)
Dept: CT IMAGING | Facility: CLINIC | Age: 84
Discharge: HOME | End: 2024-07-03
Attending: PSYCHIATRY & NEUROLOGY | Admitting: PSYCHIATRY & NEUROLOGY
Payer: MEDICARE

## 2024-07-03 DIAGNOSIS — D49.89 ANTERIOR MEDIASTINAL TUMOR: ICD-10-CM

## 2024-07-03 LAB
CREAT BLD-MCNC: 0.9 MG/DL (ref 0.5–1)
EGFRCR SERPLBLD CKD-EPI 2021: >60 ML/MIN/1.73M2

## 2024-07-03 PROCEDURE — 82565 ASSAY OF CREATININE: CPT

## 2024-07-03 PROCEDURE — 71260 CT THORAX DX C+: CPT | Mod: MG

## 2024-07-03 PROCEDURE — 250N000011 HC RX IP 250 OP 636: Performed by: PSYCHIATRY & NEUROLOGY

## 2024-07-03 RX ORDER — IOPAMIDOL 755 MG/ML
90 INJECTION, SOLUTION INTRAVASCULAR ONCE
Status: COMPLETED | OUTPATIENT
Start: 2024-07-03 | End: 2024-07-03

## 2024-07-03 RX ADMIN — IOPAMIDOL 90 ML: 755 INJECTION, SOLUTION INTRAVENOUS at 14:47

## 2024-07-22 ENCOUNTER — PREP FOR PROCEDURE (OUTPATIENT)
Dept: SURGERY | Facility: CLINIC | Age: 84
End: 2024-07-22
Payer: MEDICARE

## 2024-07-22 ENCOUNTER — ONCOLOGY VISIT (OUTPATIENT)
Dept: ONCOLOGY | Facility: CLINIC | Age: 84
End: 2024-07-22
Attending: PSYCHIATRY & NEUROLOGY
Payer: MEDICARE

## 2024-07-22 VITALS
DIASTOLIC BLOOD PRESSURE: 66 MMHG | HEIGHT: 65 IN | RESPIRATION RATE: 16 BRPM | BODY MASS INDEX: 19.81 KG/M2 | HEART RATE: 67 BPM | OXYGEN SATURATION: 98 % | SYSTOLIC BLOOD PRESSURE: 113 MMHG | WEIGHT: 118.9 LBS | TEMPERATURE: 97.7 F

## 2024-07-22 DIAGNOSIS — D49.89 ANTERIOR MEDIASTINAL TUMOR: ICD-10-CM

## 2024-07-22 DIAGNOSIS — D49.89 THYMOMA: Primary | ICD-10-CM

## 2024-07-22 PROCEDURE — 99203 OFFICE O/P NEW LOW 30 MIN: CPT | Performed by: THORACIC SURGERY (CARDIOTHORACIC VASCULAR SURGERY)

## 2024-07-22 PROCEDURE — G0463 HOSPITAL OUTPT CLINIC VISIT: HCPCS | Performed by: THORACIC SURGERY (CARDIOTHORACIC VASCULAR SURGERY)

## 2024-07-22 RX ORDER — ATORVASTATIN CALCIUM 20 MG/1
20 TABLET, FILM COATED ORAL EVERY MORNING
COMMUNITY
Start: 2024-01-01

## 2024-07-22 RX ORDER — ENOXAPARIN SODIUM 100 MG/ML
40 INJECTION SUBCUTANEOUS
OUTPATIENT
Start: 2024-07-22

## 2024-07-22 ASSESSMENT — PAIN SCALES - GENERAL: PAINLEVEL: NO PAIN (0)

## 2024-07-22 NOTE — NURSING NOTE
"Oncology Rooming Note    July 22, 2024 11:38 AM   Zahida Cespedes is a 84 year old female who presents for:    Chief Complaint   Patient presents with    Oncology Clinic Visit     New patient     Initial Vitals: /66   Pulse 67   Temp 97.7  F (36.5  C) (Temporal)   Resp 16   Ht 1.645 m (5' 4.75\")   Wt 53.9 kg (118 lb 14.4 oz)   SpO2 98%   BMI 19.94 kg/m   Estimated body mass index is 19.94 kg/m  as calculated from the following:    Height as of this encounter: 1.645 m (5' 4.75\").    Weight as of this encounter: 53.9 kg (118 lb 14.4 oz). Body surface area is 1.57 meters squared.  No Pain (0) Comment: Data Unavailable   No LMP recorded. Patient is postmenopausal.  Allergies reviewed: Yes  Medications reviewed: Yes    Medications: Medication refills not needed today.  Pharmacy name entered into lifeIO:    [facility] #[id number] - [place], [place] - [address] PHARMACY # [id number] - [place], [place] - [address] [facility]   [facility] [place] [id number] - [place], [place] [address] [place] [address] [address]  [facility][id number] - [facility] [address]  [facility] - [place], [place] - [address]   [facility] - [place], [place] - [address]     Frailty Screening:   Is the patient here for a new oncology consult visit in cancer care? 2. No      Clinical concerns: New patient       Nataliia Bray MA              "

## 2024-07-22 NOTE — LETTER
7/22/2024      Zahida Cespedes  [address]  [place] [address]      Dear Colleague,    Thank you for referring your patient, Zahida Cespedes, to the [facility]. Please see a copy of my visit note below.    THORACIC SURGERY - NEW PATIENT OFFICE VISIT      Dear Dr. Neff,    I saw Zahida Cespedes at Dr. Fernandez s request in consultation for the evaluation and treatment of an anterior mediastinal mass.     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 She is controlled on Cellcept 500 mg daily and responds to IVIG as needed. She was first diagnosed in 2017. She has chronic right eyelid droop. She has trouble walking due to scoliosis. She can walk a couple of miles without stopping. She uses the elliptical every morning and walks every afternoon and averages over 10,000 steps daily. Her weight is stable.    Previsit Tests   CT chest 7/3/2024: 3.5 x 2.4 cm anterior mediastinal mass, solid      MR Breast 6/21/2024: enhancing 3.1 cm anterior mediastinal mass  CT chest 11/1/2017: moderate interval decrease anterior mediastinal mass, possible hyperdense cyst      PMH  Reviewed, as below    Melanoma - one in situ, other was resected and she has follow up every  6 months  Adult-onset scoliosis    PSH  Reviewed, as below    Tubal ligation  Skin cancer removal       Current Outpatient Medications:      atorvastatin (LIPITOR) 20 MG tablet, Take 20 mg by mouth daily, Disp: , Rfl:      B Complex Vitamins (VITAMIN B COMPLEX PO), Take 1 capsule by mouth daily, Disp: , Rfl:      calcium carbonate (OS-MARY) 500 MG tablet, Take 1 tablet by mouth 2 times daily , Disp: , Rfl:      Cholecalciferol (VITAMIN D3) 2000 UNITS CAPS, Take 2,000 Units by mouth daily, Disp: , Rfl:      estradiol (ESTRACE) 0.1 MG/GM vaginal cream, , Disp: , Rfl:      finasteride (PROSCAR) 5 MG tablet, Take 5 mg by mouth daily, Disp:  ", Rfl:      ipratropium (ATROVENT) 0.03 % nasal spray, Spray 2 sprays in nostril 3 times daily as needed, Disp: , Rfl:      Lutein 6 MG CAPS, Take 1 capsule by mouth daily, Disp: , Rfl:      Multiple Vitamin (MULTI-VITAMINS) TABS, Take 1 tablet by mouth daily, Disp: , Rfl:      mycophenolate (GENERIC EQUIVALENT) 500 MG tablet, Take 1 tablet (500 mg) by mouth daily, Disp: 90 tablet, Rfl: 3     Omega-3 Fatty Acids (FISH OIL CONCENTRATE) 300 MG CAPS, Take 1,000 mg by mouth daily, Disp: , Rfl:       ETOH: tiny glass of wine often  TOB: Never smoker    Physical examination  /66   Pulse 67   Temp 97.7  F (36.5  C) (Temporal)   Resp 16   Ht 1.645 m (5' 4.75\")   Wt 53.9 kg (118 lb 14.4 oz)   SpO2 98%   BMI 19.94 kg/m     Physical Exam  Constitutional:       Appearance: Normal appearance.   Eyes:      Conjunctiva/sclera: Conjunctivae normal.      Comments: Right eyelid droop   Cardiovascular:      Rate and Rhythm: Normal rate.   Pulmonary:      Effort: Pulmonary effort is normal.   Musculoskeletal:      Comments: Resting tremor   Skin:     General: Skin is warm and dry.   Neurological:      Mental Status: She is oriented to person, place, and time.   Psychiatric:         Mood and Affect: Mood normal.         Behavior: Behavior normal.         Thought Content: Thought content normal.         Judgment: Judgment normal.          From a personal perspective, she is here with her son. She lives alone and her  passed last year. She has her son and two daughters. She has 16 grandchildren and 12 greatgrandchildren (blended family). 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      Code Status: Full Code    Health Care Proxy:  Son is her health care proxy    IMPRESSION (D49.89) Anterior mediastinal tumor     This person is a 84 year old female with a new anterior mediastinal mass, likely a " thymoma. She is a good surgical candidate.      PLAN  I spent 30 min on the date of the encounter in chart review, patient visit, review of tests, documentation and/or discussion with other providers about the issues documented above. I reviewed the plan as follows:    Procedure planned: Robotic-assisted bilateral thoracoscopic thymectomy    I discussed the risks and benefits of the procedure.  The risks include bleeding requiring transfusion and/or sternotomy, injury to the phrenic nerve, need for diaphragm plication, injury to intraabdominal organs, prolonged pain, prolonged intubation and death.  The benefits could include improved control of the myasthenia, though this is not guaranteed.       Necessary Preop Tests & Appointments: Preoperative assessment clinic, Pulmonary function testing, and CT Chest    Regional Anesthesia Plan: Intraoperative intercostal nerve block    Anticoagulation Plan: Prophylactic Lovenox      I appreciate the opportunity to participate in the care of your patient and will keep you updated.      Sincerely,    José Miguel Nguyen MD        Again, thank you for allowing me to participate in the care of your patient.        Sincerely,        José Miguel Nguyen MD

## 2024-07-22 NOTE — PROGRESS NOTES
THORACIC SURGERY - NEW PATIENT OFFICE VISIT      Dear Dr. Neff,    I saw Zahida Cespedes at Dr. Fernandez s request in consultation for the evaluation and treatment of an anterior mediastinal mass.     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 She is controlled on Cellcept 500 mg daily and responds to IVIG as needed. She was first diagnosed in 2017. She has chronic right eyelid droop. She has trouble walking due to scoliosis. She can walk a couple of miles without stopping. She uses the elliptical every morning and walks every afternoon and averages over 10,000 steps daily. Her weight is stable.    Previsit Tests   CT chest 7/3/2024: 3.5 x 2.4 cm anterior mediastinal mass, solid      MR Breast 6/21/2024: enhancing 3.1 cm anterior mediastinal mass  CT chest 11/1/2017: moderate interval decrease anterior mediastinal mass, possible hyperdense cyst      PMH  Reviewed, as below    Melanoma - one in situ, other was resected and she has follow up every  6 months  Adult-onset scoliosis    PSH  Reviewed, as below    Tubal ligation  Skin cancer removal       Current Outpatient Medications:     atorvastatin (LIPITOR) 20 MG tablet, Take 20 mg by mouth daily, Disp: , Rfl:     B Complex Vitamins (VITAMIN B COMPLEX PO), Take 1 capsule by mouth daily, Disp: , Rfl:     calcium carbonate (OS-MARY) 500 MG tablet, Take 1 tablet by mouth 2 times daily , Disp: , Rfl:     Cholecalciferol (VITAMIN D3) 2000 UNITS CAPS, Take 2,000 Units by mouth daily, Disp: , Rfl:     estradiol (ESTRACE) 0.1 MG/GM vaginal cream, , Disp: , Rfl:     finasteride (PROSCAR) 5 MG tablet, Take 5 mg by mouth daily, Disp: , Rfl:     ipratropium (ATROVENT) 0.03 % nasal spray, Spray 2 sprays in nostril 3 times daily as needed, Disp: , Rfl:     Lutein 6 MG CAPS, Take 1 capsule by mouth daily, Disp: , Rfl:     Multiple Vitamin (MULTI-VITAMINS) TABS, Take 1 tablet by mouth daily,  "Disp: , Rfl:     mycophenolate (GENERIC EQUIVALENT) 500 MG tablet, Take 1 tablet (500 mg) by mouth daily, Disp: 90 tablet, Rfl: 3    Omega-3 Fatty Acids (FISH OIL CONCENTRATE) 300 MG CAPS, Take 1,000 mg by mouth daily, Disp: , Rfl:       ETOH: tiny glass of wine often  TOB: Never smoker    Physical examination  /66   Pulse 67   Temp 97.7  F (36.5  C) (Temporal)   Resp 16   Ht 1.645 m (5' 4.75\")   Wt 53.9 kg (118 lb 14.4 oz)   SpO2 98%   BMI 19.94 kg/m     Physical Exam  Constitutional:       Appearance: Normal appearance.   Eyes:      Conjunctiva/sclera: Conjunctivae normal.      Comments: Right eyelid droop   Cardiovascular:      Rate and Rhythm: Normal rate.   Pulmonary:      Effort: Pulmonary effort is normal.   Musculoskeletal:      Comments: Resting tremor   Skin:     General: Skin is warm and dry.   Neurological:      Mental Status: She is oriented to person, place, and time.   Psychiatric:         Mood and Affect: Mood normal.         Behavior: Behavior normal.         Thought Content: Thought content normal.         Judgment: Judgment normal.          From a personal perspective, she is here with her son. She lives alone and her  passed last year. She has her son and two daughters. She has 16 grandchildren and 12 greatgrandchildren (blended family). 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      Code Status: Full Code    Health Care Proxy:  Son is her health care proxy    IMPRESSION (D49.89) Anterior mediastinal tumor     This person is a 84 year old female with a new anterior mediastinal mass, likely a thymoma. She is a good surgical candidate.      PLAN  I spent 30 min on the date of the encounter in chart review, patient visit, review of tests, documentation and/or discussion with other providers about the issues documented above. I reviewed the plan as " follows:    Procedure planned: Robotic-assisted bilateral thoracoscopic thymectomy    I discussed the risks and benefits of the procedure.  The risks include bleeding requiring transfusion and/or sternotomy, injury to the phrenic nerve, need for diaphragm plication, injury to intraabdominal organs, prolonged pain, prolonged intubation and death.  The benefits could include improved control of the myasthenia, though this is not guaranteed.       Necessary Preop Tests & Appointments: Preoperative assessment clinic, Pulmonary function testing, and CT Chest    Regional Anesthesia Plan: Intraoperative intercostal nerve block    Anticoagulation Plan: Prophylactic Lovenox      I appreciate the opportunity to participate in the care of your patient and will keep you updated.      Sincerely,    José Miguel Nguyen MD

## 2024-07-23 ENCOUNTER — TELEPHONE (OUTPATIENT)
Dept: ONCOLOGY | Facility: CLINIC | Age: 84
End: 2024-07-23
Payer: MEDICARE

## 2024-07-23 NOTE — TELEPHONE ENCOUNTER
Spoke with patient to schedule procedure with Dr. Nguyen   Procedure was scheduled on 10/4 at [facility] OR  Patient will have H&P with PAC    Informed pt of surgery details:  Date:    Friday, October 04, 2024  Arrival Time:  11:00 am    Pt is aware surgery start time may change, and someone will reach out with the new arrival time, if so.    Patient is aware a COVID-19 test is needed before their procedure ONLY IF symptomatic.   (Patient is aware Thoracic is no longer requiring COVID-19 test)       Patient is aware a / is needed day of surgery.   Surgery Letter was sent via [employer],     Patient has my direct contact information for any further questions.      Called and offered pt 09/13 surgery date and she declined requesting something a week later. Writer informed pt surgeon is out the following week, but offered 10/4. Pt understood and agreed.    Angelita Conley on 7/23/2024 at 1:36 PM

## 2024-07-25 NOTE — TELEPHONE ENCOUNTER
FUTURE VISIT INFORMATION      SURGERY INFORMATION:  Date: 10/4/24  Location: [facility] or  Surgeon:  José Miguel Nguyen MD   Anesthesia Type:  general  Procedure: Robot-assisted bilateral thoracoscopic thymectomy   Consult: ov 7/22/24    RECORDS REQUESTED FROM:       Primary Care Provider: Betty Neff P.A.-C. - [place]    Pertinent Medical History: Anterior mediastinal tumor

## 2024-07-29 DIAGNOSIS — D49.89 ANTERIOR MEDIASTINAL TUMOR: Primary | ICD-10-CM

## 2024-09-03 NOTE — TELEPHONE ENCOUNTER
Called and spoke with pt to let her know the surgeon needs to move her surgery date back 1 week. Pt understood and agreed.     Angelita Conley on 9/3/2024 at 11:20 AM

## 2024-09-05 ENCOUNTER — DOCUMENTATION ONLY (OUTPATIENT)
Dept: OTHER | Facility: CLINIC | Age: 84
End: 2024-09-05
Payer: MEDICARE

## 2024-09-09 ENCOUNTER — OFFICE VISIT (OUTPATIENT)
Dept: NEUROLOGY | Facility: CLINIC | Age: 84
End: 2024-09-09
Payer: MEDICARE

## 2024-09-09 VITALS
HEIGHT: 66 IN | SYSTOLIC BLOOD PRESSURE: 137 MMHG | OXYGEN SATURATION: 99 % | HEART RATE: 73 BPM | BODY MASS INDEX: 19.27 KG/M2 | DIASTOLIC BLOOD PRESSURE: 74 MMHG | WEIGHT: 119.9 LBS

## 2024-09-09 DIAGNOSIS — G70.00 OCULAR MYASTHENIA GRAVIS (H): ICD-10-CM

## 2024-09-09 DIAGNOSIS — Z79.899 LONG TERM CURRENT USE OF IMMUNOSUPPRESSIVE DRUG: Primary | ICD-10-CM

## 2024-09-09 PROCEDURE — 99213 OFFICE O/P EST LOW 20 MIN: CPT | Performed by: PSYCHIATRY & NEUROLOGY

## 2024-09-09 PROCEDURE — G2211 COMPLEX E/M VISIT ADD ON: HCPCS | Performed by: PSYCHIATRY & NEUROLOGY

## 2024-09-09 RX ORDER — CHOLECALCIFEROL (VITAMIN D3) 125 MCG
1 CAPSULE ORAL EVERY MORNING
COMMUNITY
Start: 2023-01-16

## 2024-09-09 RX ORDER — CLOBETASOL PROPIONATE 0.5 MG/G
OINTMENT TOPICAL
COMMUNITY
Start: 2024-02-15

## 2024-09-09 ASSESSMENT — PAIN SCALES - GENERAL: PAINLEVEL: NO PAIN (0)

## 2024-09-09 NOTE — PROGRESS NOTES
"Betty Neff, PNeetuANeetu-C.   [address] [place] [address]      [place], September 9,2024     Dear Ms Aishwarya,    I had the pleasure to evaluate Zahida via billable video visit today.  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  In the past she responded well to IVIG but not to steroids or pyridostigmine. She is now very well controlled on Cellcept only 500 mg daily. When we stopped Cellcept completely in 6/2021 she had reemergence of her diplopia requiring re-treatment with IVIG for a few months. Last IVIG dose was in 12/2021. She is now doing well. She has no ptosis or diplopia, or generalized MG symptoms like dysphagia, dysarthria, dysphonia, difficulty chewing, SOB on exertion, weakness of arms or legs were reported. MG ADL score is ZERO.    She gets skin exams q6 months by a dermatologist due to history of melanoma. Last CBC was done 5/2024; it was normal except for minimally elevated MCV at 98.    The new development in the last 3 to 4 months was the incidental discovery of an anterior mediastinal mass after she had a breast MRI.  This was followed by chest CT showing a 3.5 cm solid mass.  Thymoma could not be ruled out.  She has already seen thoracic surgery (Dr Nguyen, 7/2024) and there are plans for robotic assisted bilateral thoracoscopic thymectomy on October 11, 2024.       /74 (BP Location: Left arm, Patient Position: Sitting, Cuff Size: Adult Small)   Pulse 73   Ht 1.676 m (5' 6\")   Wt 54.4 kg (119 lb 14.4 oz)   SpO2 99%   BMI 19.35 kg/m      Current Outpatient Medications   Medication Sig Dispense Refill    atorvastatin (LIPITOR) 20 MG tablet Take 20 mg by mouth daily      B Complex Vitamins (VITAMIN B COMPLEX PO) Take 1 capsule by mouth daily      calcium carbonate (OS-MARY) 500 MG tablet Take 1 tablet by mouth 2 times daily       Cholecalciferol (VITAMIN D3) 2000 UNITS CAPS Take 2,000 Units by mouth daily      " clobetasol (TEMOVATE) 0.05 % external ointment Apply to vulva DAILY for 6 - 12 weeks. Then, apply 2 -3 x weekly.*      Echinacea 650 MG CAPS       estradiol (ESTRACE) 0.1 MG/GM vaginal cream       finasteride (PROSCAR) 5 MG tablet Take 5 mg by mouth daily      ipratropium (ATROVENT) 0.03 % nasal spray Spray 2 sprays in nostril 3 times daily as needed      Lutein 6 MG CAPS Take 1 capsule by mouth daily      Magnesium 400 MG CAPS       Multiple Vitamin (MULTI-VITAMINS) TABS Take 1 tablet by mouth daily      mycophenolate (GENERIC EQUIVALENT) 500 MG tablet Take 1 tablet (500 mg) by mouth daily 90 tablet 3    Omega-3 Fatty Acids (FISH OIL CONCENTRATE) 300 MG CAPS Take 1,000 mg by mouth daily       No current facility-administered medications for this visit.     EXAM shows no ptosis after 30 seconds of sustained upward gaze.  There is no diplopia in any cardinal gaze position, and eye ductions and versions are normal.  There is no weakness of orbicularis oculi.  She has no dysphonia or dysarthria.  Her strength of neck flexion and extension is full.  Strength in the upper and lower limbs is 5/5 proximally.    IMPRESSION:   In summary, Zahida's ocular MG is still in remission, on a very low daily dose of CellCept 500 mg, which should be continued indefinitely. She should continue with her regular skin exams and annual cancer screen per primary care guidelines, because long term MMF use can increase the risk of malignancy and she understands this concept well. I recommend checking another CBC next month, prior to her thoracic surgery.    I explained to her that there is a small risk of myasthenia exacerbation after thoracic surgery for thymoma, but I do not recommend prophylactic IVIG use, especially if the patient is in remission prior to surgery, and her symptoms are purely ocular.  I only prescribe IVIG before surgery if the patient has generalized MG, and especially if they are not optimally controlled before surgery;  none of the two is the case for Zahida right now.      Follow up in 6 months, sooner if needed.     Billing: MDM level 3 (low) based on A) problems assessed: 1 stable chronic illness.   B) Risk: Moderate because we are doing prescription drug management (CellCept, and monitoring with labs).     Sincerely,         Erick Fernandez MD , FAAN    The longitudinal plan of care for the diagnosis(es)/condition(s) as documented were addressed during this visit. Due to the added complexity in care, I will continue to support Zahida in the subsequent management and with ongoing continuity of care.

## 2024-09-09 NOTE — LETTER
"9/9/2024       RE: Zahida Cespedes  [address]  [place] [address]     Dear Colleague,    Thank you for referring your patient, Zahida Cespedes, to the [facility]UROLOG[facility]IC [place] at [facility]. Please see a copy of my visit note below.    Betty Neff P.A.-C.   [address] [place] [address]      [place], September 9,2024     Dear Ms Aishwarya,    I had the pleasure to evaluate Zahida via billable video visit today.  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  In the past she responded well to IVIG but not to steroids or pyridostigmine. She is now very well controlled on Cellcept only 500 mg daily. When we stopped Cellcept completely in 6/2021 she had reemergence of her diplopia requiring re-treatment with IVIG for a few months. Last IVIG dose was in 12/2021. She is now doing well. She has no ptosis or diplopia, or generalized MG symptoms like dysphagia, dysarthria, dysphonia, difficulty chewing, SOB on exertion, weakness of arms or legs were reported. MG ADL score is ZERO.    She gets skin exams q6 months by a dermatologist due to history of melanoma. Last CBC was done 5/2024; it was normal except for minimally elevated MCV at 98.    The new development in the last 3 to 4 months was the incidental discovery of an anterior mediastinal mass after she had a breast MRI.  This was followed by chest CT showing a 3.5 cm solid mass.  Thymoma could not be ruled out.  She has already seen thoracic surgery (Dr Nguyen, 7/2024) and there are plans for robotic assisted bilateral thoracoscopic thymectomy on October 11, 2024.       /74 (BP Location: Left arm, Patient Position: Sitting, Cuff Size: Adult Small)   Pulse 73   Ht 1.676 m (5' 6\")   Wt 54.4 kg (119 lb 14.4 oz)   SpO2 99%   BMI 19.35 kg/m      Current Outpatient Medications   Medication Sig Dispense " Refill     atorvastatin (LIPITOR) 20 MG tablet Take 20 mg by mouth daily       B Complex Vitamins (VITAMIN B COMPLEX PO) Take 1 capsule by mouth daily       calcium carbonate (OS-MARY) 500 MG tablet Take 1 tablet by mouth 2 times daily        Cholecalciferol (VITAMIN D3) 2000 UNITS CAPS Take 2,000 Units by mouth daily       clobetasol (TEMOVATE) 0.05 % external ointment Apply to vulva DAILY for 6 - 12 weeks. Then, apply 2 -3 x weekly.*       Echinacea 650 MG CAPS        estradiol (ESTRACE) 0.1 MG/GM vaginal cream        finasteride (PROSCAR) 5 MG tablet Take 5 mg by mouth daily       ipratropium (ATROVENT) 0.03 % nasal spray Spray 2 sprays in nostril 3 times daily as needed       Lutein 6 MG CAPS Take 1 capsule by mouth daily       Magnesium 400 MG CAPS        Multiple Vitamin (MULTI-VITAMINS) TABS Take 1 tablet by mouth daily       mycophenolate (GENERIC EQUIVALENT) 500 MG tablet Take 1 tablet (500 mg) by mouth daily 90 tablet 3     Omega-3 Fatty Acids (FISH OIL CONCENTRATE) 300 MG CAPS Take 1,000 mg by mouth daily       No current facility-administered medications for this visit.     EXAM shows no ptosis after 30 seconds of sustained upward gaze.  There is no diplopia in any cardinal gaze position, and eye ductions and versions are normal.  There is no weakness of orbicularis oculi.  She has no dysphonia or dysarthria.  Her strength of neck flexion and extension is full.  Strength in the upper and lower limbs is 5/5 proximally.    IMPRESSION:   In summary, Zahida's ocular MG is still in remission, on a very low daily dose of CellCept 500 mg, which should be continued indefinitely. She should continue with her regular skin exams and annual cancer screen per primary care guidelines, because long term MMF use can increase the risk of malignancy and she understands this concept well. I recommend checking another CBC next month, prior to her thoracic surgery.    I explained to her that there is a small risk of myasthenia  exacerbation after thoracic surgery for thymoma, but I do not recommend prophylactic IVIG use, especially if the patient is in remission prior to surgery, and her symptoms are purely ocular.  I only prescribe IVIG before surgery if the patient has generalized MG, and especially if they are not optimally controlled before surgery; none of the two is the case for Zahida right now.      Follow up in 6 months, sooner if needed.     Billing: MDM level 3 (low) based on A) problems assessed: 1 stable chronic illness.   B) Risk: Moderate because we are doing prescription drug management (CellCept, and monitoring with labs).     Sincerely,         Erick Fernandez MD , FAAN    The longitudinal plan of care for the diagnosis(es)/condition(s) as documented were addressed during this visit. Due to the added complexity in care, I will continue to support Zahida in the subsequent management and with ongoing continuity of care.      Again, thank you for allowing me to participate in the care of your patient.      Sincerely,    Erick Fernandez MD

## 2024-09-09 NOTE — PATIENT INSTRUCTIONS
Follow-up in 6 months, virtual is okay    Please get a repeat blood count drawn about 10 days prior to the surgery

## 2024-09-09 NOTE — NURSING NOTE
Chief Complaint   Patient presents with    Myasthenia Gravis     Vitals were taken and medications were reconciled.   Nino Jauregui, EMT  12:52 PM

## 2024-09-15 LAB
ABO/RH(D): NORMAL
ANTIBODY SCREEN: NEGATIVE
SPECIMEN EXPIRATION DATE: NORMAL

## 2024-09-16 ENCOUNTER — ANCILLARY PROCEDURE (OUTPATIENT)
Dept: CT IMAGING | Facility: CLINIC | Age: 84
End: 2024-09-16
Attending: THORACIC SURGERY (CARDIOTHORACIC VASCULAR SURGERY)
Payer: MEDICARE

## 2024-09-16 ENCOUNTER — LAB (OUTPATIENT)
Dept: LAB | Facility: CLINIC | Age: 84
End: 2024-09-16
Attending: INTERNAL MEDICINE
Payer: MEDICARE

## 2024-09-16 ENCOUNTER — OFFICE VISIT (OUTPATIENT)
Dept: SURGERY | Facility: CLINIC | Age: 84
End: 2024-09-16
Payer: MEDICARE

## 2024-09-16 ENCOUNTER — ANESTHESIA EVENT (OUTPATIENT)
Dept: SURGERY | Facility: CLINIC | Age: 84
DRG: 804 | End: 2024-09-16
Payer: MEDICARE

## 2024-09-16 ENCOUNTER — APPOINTMENT (OUTPATIENT)
Dept: LAB | Facility: CLINIC | Age: 84
End: 2024-09-16
Payer: MEDICARE

## 2024-09-16 ENCOUNTER — OFFICE VISIT (OUTPATIENT)
Dept: PULMONOLOGY | Facility: CLINIC | Age: 84
End: 2024-09-16
Attending: CLINICAL NURSE SPECIALIST
Payer: MEDICARE

## 2024-09-16 ENCOUNTER — PRE VISIT (OUTPATIENT)
Dept: SURGERY | Facility: CLINIC | Age: 84
End: 2024-09-16

## 2024-09-16 VITALS
HEIGHT: 66 IN | OXYGEN SATURATION: 99 % | BODY MASS INDEX: 18.96 KG/M2 | TEMPERATURE: 98 F | DIASTOLIC BLOOD PRESSURE: 73 MMHG | WEIGHT: 118 LBS | RESPIRATION RATE: 16 BRPM | HEART RATE: 70 BPM | SYSTOLIC BLOOD PRESSURE: 131 MMHG

## 2024-09-16 DIAGNOSIS — D49.89 ANTERIOR MEDIASTINAL TUMOR: ICD-10-CM

## 2024-09-16 DIAGNOSIS — Z01.818 PREOP EXAMINATION: ICD-10-CM

## 2024-09-16 DIAGNOSIS — D49.89 THYMOMA: ICD-10-CM

## 2024-09-16 DIAGNOSIS — G70.00 OCULAR MYASTHENIA GRAVIS (H): ICD-10-CM

## 2024-09-16 DIAGNOSIS — Z79.899 LONG TERM CURRENT USE OF IMMUNOSUPPRESSIVE DRUG: ICD-10-CM

## 2024-09-16 DIAGNOSIS — I35.0 NONRHEUMATIC AORTIC VALVE STENOSIS: Primary | ICD-10-CM

## 2024-09-16 LAB
ANION GAP SERPL CALCULATED.3IONS-SCNC: 8 MMOL/L (ref 7–15)
BASOPHILS # BLD AUTO: 0 10E3/UL (ref 0–0.2)
BASOPHILS NFR BLD AUTO: 0 %
BUN SERPL-MCNC: 17.2 MG/DL (ref 8–23)
CALCIUM SERPL-MCNC: 9.2 MG/DL (ref 8.8–10.4)
CHLORIDE SERPL-SCNC: 98 MMOL/L (ref 98–107)
CREAT BLD-MCNC: 0.9 MG/DL (ref 0.5–1)
CREAT SERPL-MCNC: 0.77 MG/DL (ref 0.51–0.95)
EGFRCR SERPLBLD CKD-EPI 2021: 75 ML/MIN/1.73M2
EGFRCR SERPLBLD CKD-EPI 2021: >60 ML/MIN/1.73M2
EOSINOPHIL # BLD AUTO: 0 10E3/UL (ref 0–0.7)
EOSINOPHIL NFR BLD AUTO: 0 %
ERYTHROCYTE [DISTWIDTH] IN BLOOD BY AUTOMATED COUNT: 12.6 % (ref 10–15)
GLUCOSE SERPL-MCNC: 93 MG/DL (ref 70–99)
HCO3 SERPL-SCNC: 29 MMOL/L (ref 22–29)
HCT VFR BLD AUTO: 42 % (ref 35–47)
HGB BLD-MCNC: 14.4 G/DL (ref 11.7–15.7)
IMM GRANULOCYTES # BLD: 0 10E3/UL
IMM GRANULOCYTES NFR BLD: 0 %
LYMPHOCYTES # BLD AUTO: 1.6 10E3/UL (ref 0.8–5.3)
LYMPHOCYTES NFR BLD AUTO: 27 %
MCH RBC QN AUTO: 33.6 PG (ref 26.5–33)
MCHC RBC AUTO-ENTMCNC: 34.3 G/DL (ref 31.5–36.5)
MCV RBC AUTO: 98 FL (ref 78–100)
MONOCYTES # BLD AUTO: 0.5 10E3/UL (ref 0–1.3)
MONOCYTES NFR BLD AUTO: 8 %
NEUTROPHILS # BLD AUTO: 3.9 10E3/UL (ref 1.6–8.3)
NEUTROPHILS NFR BLD AUTO: 65 %
NRBC # BLD AUTO: 0 10E3/UL
NRBC BLD AUTO-RTO: 0 /100
PLATELET # BLD AUTO: 185 10E3/UL (ref 150–450)
POTASSIUM SERPL-SCNC: 4.2 MMOL/L (ref 3.4–5.3)
RBC # BLD AUTO: 4.29 10E6/UL (ref 3.8–5.2)
SODIUM SERPL-SCNC: 135 MMOL/L (ref 135–145)
WBC # BLD AUTO: 6.1 10E3/UL (ref 4–11)

## 2024-09-16 PROCEDURE — 94729 DIFFUSING CAPACITY: CPT | Performed by: INTERNAL MEDICINE

## 2024-09-16 PROCEDURE — 80048 BASIC METABOLIC PNL TOTAL CA: CPT | Performed by: PATHOLOGY

## 2024-09-16 PROCEDURE — 86900 BLOOD TYPING SEROLOGIC ABO: CPT

## 2024-09-16 PROCEDURE — 82565 ASSAY OF CREATININE: CPT | Performed by: PATHOLOGY

## 2024-09-16 PROCEDURE — 36415 COLL VENOUS BLD VENIPUNCTURE: CPT | Performed by: PATHOLOGY

## 2024-09-16 PROCEDURE — 94726 PLETHYSMOGRAPHY LUNG VOLUMES: CPT | Performed by: INTERNAL MEDICINE

## 2024-09-16 PROCEDURE — 94375 RESPIRATORY FLOW VOLUME LOOP: CPT | Performed by: INTERNAL MEDICINE

## 2024-09-16 PROCEDURE — G1010 CDSM STANSON: HCPCS | Performed by: RADIOLOGY

## 2024-09-16 PROCEDURE — 71260 CT THORAX DX C+: CPT | Mod: MF | Performed by: RADIOLOGY

## 2024-09-16 PROCEDURE — 85025 COMPLETE CBC W/AUTO DIFF WBC: CPT | Performed by: PATHOLOGY

## 2024-09-16 PROCEDURE — 99203 OFFICE O/P NEW LOW 30 MIN: CPT | Performed by: PHYSICIAN ASSISTANT

## 2024-09-16 PROCEDURE — 94150 VITAL CAPACITY TEST: CPT | Performed by: INTERNAL MEDICINE

## 2024-09-16 RX ORDER — IOPAMIDOL 755 MG/ML
58 INJECTION, SOLUTION INTRAVASCULAR ONCE
Status: COMPLETED | OUTPATIENT
Start: 2024-09-16 | End: 2024-09-16

## 2024-09-16 RX ADMIN — IOPAMIDOL 58 ML: 755 INJECTION, SOLUTION INTRAVASCULAR at 13:01

## 2024-09-16 ASSESSMENT — PAIN SCALES - GENERAL: PAINLEVEL: NO PAIN (0)

## 2024-09-16 ASSESSMENT — LIFESTYLE VARIABLES: TOBACCO_USE: 0

## 2024-09-16 ASSESSMENT — ENCOUNTER SYMPTOMS: SEIZURES: 0

## 2024-09-16 NOTE — H&P
Pre-Operative H & P     CC:  Preoperative exam to assess for increased cardiopulmonary risk while undergoing surgery and anesthesia.    Date of Encounter: 9/16/2024  Primary Care Physician:  Betty Neff     Reason for visit:   Encounter Diagnoses   Name Primary?    Preop examination     Thymoma     Nonrheumatic aortic valve stenosis Yes       HPI  Zahida Cespedes is a 84 year old female who presents for pre-operative H & P in preparation for  Procedure Information       Case: [id number] Date/Time: 10/11/24 1305    Procedure: Robot-assisted bilateral thoracoscopic thymectomy (Bilateral: Abdomen)    Anesthesia type: General    Diagnosis: Thymoma [D49.89]    Pre-op diagnosis: Thymoma [D49.89]    Location:  OR  /  OR    Providers: José Miguel Nguyen MD            Ms. Cespedes has a PMH significant for dyslipidemia, aortic stenosis, myasthenia gravis with chronic eyelid droop, scoliosis, and thymoma.  She met with Dr. Nguyen and the above surgery is now planned.    History is obtained from the patient and chart review    Hx of abnormal bleeding or anti-platelet use: Denies    Menstrual history: No LMP recorded. Patient is postmenopausal.:      Past Medical History  Past Medical History:   Diagnosis Date    Aortic stenosis     Dyslipidemia     Myasthenia gravis without exacerbation (H)        Past Surgical History  Past Surgical History:   Procedure Laterality Date    COLONOSCOPY      skin cancer removal      TUBAL LIGATION         Prior to Admission Medications  Current Outpatient Medications   Medication Sig Dispense Refill    atorvastatin (LIPITOR) 20 MG tablet Take 20 mg by mouth every morning.      B Complex Vitamins (VITAMIN B COMPLEX PO) Take 1 capsule by mouth every morning.      calcium carbonate (OS-MARY) 500 MG tablet Take 1 tablet by mouth every morning.      Cholecalciferol (VITAMIN D3) 2000 UNITS CAPS Take 2,000 Units by mouth every morning.      clobetasol (TEMOVATE) 0.05 % external ointment Apply  COVID Treatment (Moderate Pathway):  Continue supplement O2 as needed to maintain O2 saturations >92%  Vitamin C/Zinc/Statin x 10 days  Steroids:  Dexamethasone 6 mg q day  Convalescent Plasma: candidate for   Remdesivir: x 5 days  Day 1/5  Actemra:  Check inflammatory markers  Continue with systemic steroids times 24 hours  If patient fails to show improvement may be candidate for Actemra  Anticoagulation:  Chronically anticoagulated  Hold Eliquis  Heparin GTT    Encourage good spirometry/pulmonary toileting topically Every Mon, Wed, Fri Morning.      Echinacea 650 MG CAPS Take 1 mg by mouth every morning.      estradiol (ESTRACE) 0.1 MG/GM vaginal cream Place vaginally Every Mon, Wed, Fri Morning.      finasteride (PROSCAR) 5 MG tablet Take 5 mg by mouth every morning.      ipratropium (ATROVENT) 0.03 % nasal spray Spray 2 sprays in nostril 3 times daily as needed      Lutein 6 MG CAPS Take 1 capsule by mouth every morning.      Magnesium 400 MG CAPS Take 1 capsule by mouth every morning.      Multiple Vitamin (MULTI-VITAMINS) TABS Take 1 tablet by mouth every morning.      mycophenolate (GENERIC EQUIVALENT) 500 MG tablet Take 1 tablet (500 mg) by mouth daily (Patient taking differently: Take 500 mg by mouth every morning.) 90 tablet 3    Omega-3 Fatty Acids (FISH OIL CONCENTRATE) 300 MG CAPS Take 1,000 mg by mouth every morning.         Allergies  Allergies   Allergen Reactions    Bee Venom      Other reaction(s): Edema  Swelling @ sting site  Other reaction(s): Other (see comments)  No reaction listed in [employer]    Ciprofloxacin Nausea and Vomiting     Myasthenia gravis       Social History  Social History     Socioeconomic History    Marital status:      Spouse name: Not on file    Number of children: Not on file    Years of education: Not on file    Highest education level: Not on file   Occupational History    Not on file   Tobacco Use    Smoking status: Never    Smokeless tobacco: Never   Substance and Sexual Activity    Alcohol use: Yes     Alcohol/week: 1.0 standard drink of alcohol     Types: 1 Glasses of wine per week     Comment: 1/4 glass wine 3-4x week    Drug use: Never    Sexual activity: Not Currently     Partners: Male   Other Topics Concern    Not on file   Social History Narrative    Not on file     Social Determinants of Health     Financial Resource Strain: Low Risk  (6/8/2023)    [facility]    Overall Financial Resource Strain (CARDIA)     Difficulty of Paying  Living Expenses: Not hard at all   Food Insecurity: No Food Insecurity (6/2/2024)    [facility]    Hunger Vital Sign     Worried About Running Out of Food in the Last Year: Never true     Ran Out of Food in the Last Year: Never true   Transportation Needs: No Transportation Needs (6/2/2024)    [facility]    PRAPARE - Transportation     Lack of Transportation (Medical): No     Lack of Transportation (Non-Medical): No   Physical Activity: Sufficiently Active (6/2/2024)    [facility]    Exercise Vital Sign     Days of Exercise per Week: 7 days     Minutes of Exercise per Session: 40 min   Stress: No Stress Concern Present (3/30/2023)    [facility]    [employer] [place] [employer] - Occupational Stress Questionnaire     Feeling of Stress : Not at all   Social Connections: Moderately Isolated (3/30/2023)    [facility]    Social Connection and Isolation Panel [NHANES]     Frequency of Communication with Friends and Family: More than three times a week     Frequency of Social Gatherings with Friends and Family: Once a week     Attends Scientologist Services: Never     Active Member of Clubs or Organizations: No     Attends Club or Organization Meetings: Never     Marital Status:    Interpersonal Safety: Not At Risk (6/8/2023)    [facility]    Humiliation, Afraid, Rape, and Kick questionnaire     Fear of Current or Ex-Partner: No     Emotionally Abused: No     Physically Abused: No     Sexually Abused: No   Housing Stability: Low Risk  (6/2/2024)    [facility]    Housing Stability     What is your living situation today?: I have a steady place to live       Family History  Family History   Problem Relation Age of Onset    Anesthesia Reaction No family hx of     Venous thrombosis No family hx of        Review of Systems  The complete review of systems is negative other  than noted in the HPI or here.     Anesthesia Evaluation   Pt has had prior anesthetic.     No history of anesthetic complications       ROS/MED HX  ENT/Pulmonary:    (-) tobacco use, asthma and sleep apnea   Neurologic:  - neg neurologic ROS  (-) no seizures and no CVA   Cardiovascular:     (+) Dyslipidemia - -   -  - -                           valvular problems/murmurs type: AS     Previous cardiac testing   Echo: Date: 11/16/2022 Results:  Final Impressions   1. Transthoracic outreach echo interpretation.   2. Moderate calcific aortic valve stenosis, systolic mean Doppler gradient 19 mmHg, valve area by Doppler 1.2 cm2.   3. Normal left ventricular chamber size, no regional wall motion abnormalities, calculated 2-D biplane volumetric ejection fraction of 64%.   4. Abnormal left ventricular geometry with  concentric left ventricular hypertrophy, grade 1/3 diastolic dysfunction, consistent with low to normal filling pressure.   5. Normal right ventricular chamber size, normal systolic function, estimated right ventricular systolic pressure 24 mmHg (right atrial pressure of 5 mmHg).   6. Tiny anterior pericardial effusion.   7. Compared to the report of 10/13/2011 the following changes have occurred: aortic stenosis is now present; decreased left ventricular filling pressure.  Side by side comparison of images performed.   8. Repeat transthoracic echocardiogram is recommended in 1-2 years, according to practice guidelines.     Echo 10/2/24:  Interpretation Summary     Moderate valvular aortic stenosis.  The mean AoV pressure gradient is 29mmHg.  The calculated aortic valve area is 1.0cm2.  The visual ejection fraction is 60-65%.      Stress Test:  Date: Results:    ECG Reviewed:  Date: Results:    Cath:  Date: Results:      METS/Exercise Tolerance: >4 METS Comment: Good exercise tolerance, exercises regularly   Hematologic:  - neg hematologic  ROS  (-) history of blood clots and history of blood transfusion  "  Musculoskeletal:  - neg musculoskeletal ROS     GI/Hepatic:  - neg GI/hepatic ROS  (-) GERD   Renal/Genitourinary:  - neg Renal ROS     Endo:  - neg endo ROS     Psychiatric/Substance Use:  - neg psychiatric ROS     Infectious Disease:  - neg infectious disease ROS     Malignancy:   (+) Malignancy, History of Other and Skin.Skin CA Remission status post Surgery.  Other CA Thymoma Active status post.    Other: Comment: Myasthenia gravis with chronic eyelid droop in remission - neg other ROS          /73 (BP Location: Right arm, Patient Position: Sitting, Cuff Size: Adult Regular)   Pulse 70   Temp 98  F (36.7  C) (Oral)   Resp 16   Ht 1.664 m (5' 5.5\")   Wt 53.5 kg (118 lb)   SpO2 99%   BMI 19.34 kg/m      Physical Exam   Constitutional: Awake, alert, cooperative, no apparent distress, and appears stated age.  Eyes: Pupils equal, round and reactive to light, extra ocular muscles intact, sclera clear, conjunctiva normal.  HENT: Normocephalic, oral pharynx with moist mucus membranes, good dentition. No goiter appreciated.   Respiratory: Clear to auscultation bilaterally, no crackles or wheezing.  Cardiovascular: Regular rate and rhythm, normal S1 and S2, and systolic murmur noted. No edema. Palpable pulses to radial and PT arteries.   GI:  soft, non-distended, non-tender, no masses palpated  Lymph/Hematologic: No cervical lymphadenopathy and no supraclavicular lymphadenopathy.  Genitourinary:  deferred  Skin: Warm and dry.  .   Musculoskeletal: Full ROM of neck. There is no redness, warmth, or swelling of the joints. Gross motor strength is normal.    Neurologic: Awake, alert, oriented to name, place and time. Cranial nerves II-XII are grossly intact. Gait is normal.   Neuropsychiatric: Calm, cooperative. Normal affect.     Prior Labs/Diagnostic Studies   All labs and imaging personally reviewed     Component      Latest Ref Rng 9/16/2024  12:41 PM   WBC      4.0 - 11.0 10e3/uL 6.1    RBC Count      3.80 " - 5.20 10e6/uL 4.29    Hemoglobin      11.7 - 15.7 g/dL 14.4    Hematocrit      35.0 - 47.0 % 42.0    MCV      78 - 100 fL 98    MCH      26.5 - 33.0 pg 33.6 (H)    MCHC      31.5 - 36.5 g/dL 34.3    RDW      10.0 - 15.0 % 12.6    Platelet Count      150 - 450 10e3/uL 185    % Neutrophils      % 65    % Lymphocytes      % 27    % Monocytes      % 8    % Eosinophils      % 0    % Basophils      % 0    % Immature Granulocytes      % 0    NRBCs per 100 WBC      <1 /100 0    Absolute Neutrophils      1.6 - 8.3 10e3/uL 3.9    Absolute Lymphocytes      0.8 - 5.3 10e3/uL 1.6    Absolute Monocytes      0.0 - 1.3 10e3/uL 0.5    Absolute Eosinophils      0.0 - 0.7 10e3/uL 0.0    Absolute Basophils      0.0 - 0.2 10e3/uL 0.0    Absolute Immature Granulocytes      <=0.4 10e3/uL 0.0    Absolute NRBCs      10e3/uL 0.0       Legend:  (H) High    Component      Latest Ref Rng 9/16/2024  12:41 PM   Sodium      135 - 145 mmol/L 135    Potassium      3.4 - 5.3 mmol/L 4.2    Chloride      98 - 107 mmol/L 98    Carbon Dioxide (CO2)      22 - 29 mmol/L 29    Anion Gap      7 - 15 mmol/L 8    Urea Nitrogen      8.0 - 23.0 mg/dL 17.2    Creatinine      0.51 - 0.95 mg/dL 0.77    GFR Estimate      >60 mL/min/1.73m2 75    Calcium      8.8 - 10.4 mg/dL 9.2    Glucose      70 - 99 mg/dL 93          EKG/echo /stress test - if available please see in ROS above       The patient's records and results personally reviewed by this provider.     Outside records reviewed from: Care Everywhere        Assessment    Zahida Cespedes is a 84 year old female seen as a PAC referral for risk assessment and optimization for anesthesia.    Plan/Recommendations  Pt will be optimized for the proposed procedure.  See below for details on the assessment, risk, and preoperative recommendations    NEUROLOGY  - No history of TIA, CVA or seizure    -Post Op delirium risk factors:  Age    - acetylcholine receptor antibody positive, predominantly ocular myasthenia gravis  "in remission. very well controlled on Cellcept only 500 mg daily.   - last visit with neuro 9/9/24. Did not recommend IVIG before upcoming surgery as MG is in remission    ENT  - No current airway concerns.  Will need to be reassessed day of surgery.  Mallampati: I  TM: > 3    CARDIAC  - Hyperlipidemia  - aortic stenosis. Per echo 2022: Moderate calcific aortic valve stenosis, systolic mean Doppler gradient 19 mmHg, valve area by Doppler 1.2 cm2.   - recommend updated echo prior to above surgery    ADDENDUM: Echocardiogram 10/2/24:  Interpretation Summary     Moderate valvular aortic stenosis.  The mean AoV pressure gradient is 29mmHg.  The calculated aortic valve area is 1.0cm2.  The visual ejection fraction is 60-65%.    - METS (Metabolic Equivalents)  Patient performs 4 or more METS exercise without symptoms             Total Score: 0      RCRI-Low risk: Class 2 0.9% complication rate             Total Score: 1    RCRI: High Risk Surgery        PULMONARY    AZIZA Low Risk             Total Score: 1    AZIZA: Over 50 ys old      - Denies asthma or inhaler use  - Tobacco History    History   Smoking Status    Never   Smokeless Tobacco    Never       GI  - denies GERD  PONV High Risk  Total Score: 3           1 AN PONV: Pt is Female    1 AN PONV: Patient is not a current smoker    1 AN PONV: Intended Post Op Opioids        /RENAL  - Baseline Creatinine  0.77    ENDOCRINE    - BMI: Estimated body mass index is 19.34 kg/m  as calculated from the following:    Height as of this encounter: 1.664 m (5' 5.5\").    Weight as of this encounter: 53.5 kg (118 lb).  Healthy Weight (BMI 18.5-24.9)  - No history of Diabetes Mellitus    HEME  VTE Medium Risk 1.8%             Total Score: 6    VTE: Greater than 59 yrs old    VTE: Current cancer      - No history of abnormal bleeding or antiplatelet use.      MSK    - scoliosis    Patient is NOT Frail             Total Score: 0        Different anesthesia methods/types have been " discussed with the patient, but they are aware that the final plan will be decided by the assigned anesthesia provider on the date of service.      The patient is optimized for their procedure pending echocardiogram.     AVS with information on surgery time/arrival time, meds and NPO status given by nursing staff. No further diagnostic testing indicated.      On the day of service:     Prep time: 13 minutes  Visit time: 15 minutes  Documentation time: 12 minutes  ------------------------------------------  Total time: 40 minutes      Ana Rosa Donahue PA-C  Preoperative Assessment Center  [facility]  Clinic and Surgery Center  Phone: [phone]  Fax: [phone]

## 2024-09-16 NOTE — PATIENT INSTRUCTIONS
Preparing for Your Surgery      Name:  Zahida Cespedes   MRN:  [id number]   :  1940   Today's Date:  2024       Arriving for surgery:  Surgery date:  10-11-24  Arrival time:  11 am    Please come to:     Please come to:   [facility] [place] [facility]    [address] [place]  [address]     The [facility] [place] Patient/Visitor Ramp is at [address]. Patients and visitors who self-park will receive the reduced hospital parking rate. If the Patient /Visitor Ramp is full, please follow the signs to the [employer] car park located at the main hospital entrance.       parking is available (24 hours/ 7 days a week)      Discounted parking pass options are available for patients and visitors. They can be purchased at the [employer] desk at the main hospital entrance.     -  Stop at the security desk and they will direct surgery patients to Registration and the [facility] [phone]        - If you need directions, a wheelchair or an escort please stop at the Information/security desk in the lobby.     What can I eat or drink?  -  You may eat and drink normally up to 8 hours prior to arrival time. (Until 3 am)  -  You may have clear liquids until 2 hours prior to arrival time. (Until 9 am)    Examples of clear liquids:  Water  Clear broth  Juices (apple, white grape, white cranberry  and cider) without pulp  Noncarbonated, powder based beverages  (lemonade and Samuel-Aid)  Sodas (Sprite, 7-Up, ginger ale and seltzer)  Coffee or tea (without milk or cream)  Gatorade    -  No Alcohol or cannabis products for at least 24 hours before surgery.     Which medicines can I take?  Hold Aspirin for 7 days before surgery.   Hold Multivitamins for 7 days before surgery. Take the last Multivitamin on 10-3-24, and then hold until surgery.  Hold Supplements for 7 days before surgery. Take the  last Echinacea, Omega-3 Fatty Acids (Fish Oil), and Lutein on 10-3-24, and then hold until surgery.  Hold Ibuprofen (Advil, Motrin) for 1 day before surgery--unless otherwise directed by surgeon.  Hold Naproxen (Aleve) for 4 days before surgery.  Acetaminophen (Tylenol is okay to take if needed.    -  DO NOT take these medications the day of surgery:  B Complex Vitamins  Calcium Carbonate (Os-Gabe)  Vitamin D3 (Cholecalciferol)  Magnesium    -  PLEASE TAKE these medications the day of surgery:  Atorvastatin (Lipitor)  Finasteride (Proscar)  Mycophenolate  Ipratropium (Atrovent) nasal spray if needed  Acetaminophen (Tylenol) if needed    How do I prepare myself?  - Please take 2 showers (one the night prior to surgery and one the morning of surgery) using Scrubcare or Hibiclens soap.   You may use your own shampoo and conditioner. No other hair products.     Use this soap only from the neck to your toes.     Leave the soap on your skin for one minute--then rinse thoroughly.     - Please remove all jewelry and body piercings.  - No lotions, deodorants or fragrance.  - No makeup or fingernail polish.   - Bring your ID and insurance card.    -If you use a CPAP machine, please bring the CPAP machine, tubing, and mask to hospital.    -If you have a Deep Brain Stimulator, Spinal Cord Stimulator, or any Neuro Stimulator device---you must bring the remote control to the hospital.      ALL PATIENTS GOING HOME THE SAME DAY OF SURGERY ARE REQUIRED TO HAVE A RESPONSIBLE ADULT TO DRIVE AND BE IN ATTENDANCE WITH THEM FOR 24 HOURS FOLLOWING SURGERY.    Covid testing policy as of 12/06/2022  Your surgeon will notify and schedule you for a COVID test if one is needed before surgery--please direct any questions or COVID symptoms to your surgeon      Questions or Concerns:    - For any questions regarding the day of surgery or your hospital stay, please contact the Pre Admission Nursing Office at [phone].       - If you have health  changes between today and your surgery, please call your surgeon.       - For questions after surgery, please call your surgeons office.           Current Visitor Guidelines    You may have 2 visitors in the pre op area.    Visiting hours: 8 a.m. to 8:30 p.m.    Patients confirmed or suspected to have symptoms of COVID 19 or flu:     No visitors allowed for adult patients.   Children (under age 18) can have 1 named visitor.     People who are sick or showing symptoms of COVID 19 or flu:    Are not allowed to visit patients--we can only make exceptions in special situations.       Please follow these guidelines for your visit:          Please maintain social distance          Masking is optional--however at times you may be asked to wear a mask for the safety of yourself and others     Clean your hands with alcohol hand . Do this when you arrive at and leave the building and patient room,    And again after you touch your mask or anything in the room.     Go directly to and from the room you are visiting.     Stay in the patient s room during your visit. Limit going to other places in the hospital as much as possible     Leave bags and jackets at home or in the car.     For everyone s health, please don t come and go during your visit. That includes for smoking   during your visit.

## 2024-09-16 NOTE — DISCHARGE INSTRUCTIONS

## 2024-09-16 NOTE — PROGRESS NOTES
Zahida Cespedes comes into clinic today at the request of S[facility] Ordering Provider for PFT      Conor Sloitario, RRT

## 2024-09-18 LAB
DLCOUNC-%PRED-PRE: 99 %
DLCOUNC-PRE: 18.63 ML/MIN/MMHG
DLCOUNC-PRED: 18.71 ML/MIN/MMHG
ERV-%PRED-PRE: 60 %
ERV-PRE: 0.51 L
ERV-PRED: 0.84 L
EXPTIME-PRE: 4.75 SEC
FEF2575-%PRED-PRE: 160 %
FEF2575-PRE: 2.21 L/SEC
FEF2575-PRED: 1.38 L/SEC
FEFMAX-%PRED-PRE: 137 %
FEFMAX-PRE: 6.14 L/SEC
FEFMAX-PRED: 4.46 L/SEC
FEV1-%PRED-PRE: 128 %
FEV1-PRE: 2.27 L
FEV1FEV6-PRE: 80 %
FEV1FEV6-PRED: 77 %
FEV1FVC-PRE: 77 %
FEV1FVC-PRED: 77 %
FEV1SVC-PRE: 82 %
FEV1SVC-PRED: 58 %
FIFMAX-PRE: 4.47 L/SEC
FRCPLETH-%PRED-PRE: 99 %
FRCPLETH-PRE: 2.79 L
FRCPLETH-PRED: 2.82 L
FVC-%PRED-PRE: 126 %
FVC-PRE: 2.94 L
FVC-PRED: 2.33 L
IC-%PRED-PRE: 133 %
IC-PRE: 2.25 L
IC-PRED: 1.69 L
RVPLETH-%PRED-PRE: 95 %
RVPLETH-PRE: 2.28 L
RVPLETH-PRED: 2.39 L
TLCPLETH-%PRED-PRE: 96 %
TLCPLETH-PRE: 5.04 L
TLCPLETH-PRED: 5.21 L
VA-%PRED-PRE: 94 %
VA-PRE: 4.37 L
VC-%PRED-PRE: 90 %
VC-PRE: 2.76 L
VC-PRED: 3.05 L

## 2024-09-19 NOTE — TELEPHONE ENCOUNTER
Called and spoke with pt to let her know surgery start/arrival time has changed and she should now arrive by 10/:55am. Pt understood and agreed.     Angelita Conley on 9/19/2024 at 2:13 PM

## 2024-10-02 ENCOUNTER — HOSPITAL ENCOUNTER (OUTPATIENT)
Dept: CARDIOLOGY | Facility: CLINIC | Age: 84
Discharge: HOME | End: 2024-10-02
Attending: PHYSICIAN ASSISTANT | Admitting: PHYSICIAN ASSISTANT
Payer: MEDICARE

## 2024-10-02 DIAGNOSIS — Z01.818 PREOP EXAMINATION: ICD-10-CM

## 2024-10-02 DIAGNOSIS — I35.0 NONRHEUMATIC AORTIC VALVE STENOSIS: ICD-10-CM

## 2024-10-02 LAB — LVEF ECHO: NORMAL

## 2024-10-02 PROCEDURE — 93306 TTE W/DOPPLER COMPLETE: CPT

## 2024-10-02 PROCEDURE — 93306 TTE W/DOPPLER COMPLETE: CPT | Mod: 26 | Performed by: INTERNAL MEDICINE

## 2024-10-07 NOTE — TELEPHONE ENCOUNTER
Called and spoke with pt to let her know we need to postpone her surgery due to IV fluid shortage. Pt stated she has family coming in from out of town. Writer informed her a follow up call would be made when more info becomes available.     Angelita Conley on 10/7/2024 at 4:17 PM

## 2024-10-08 NOTE — TELEPHONE ENCOUNTER
Called back and spoke with the pt to confirm that per the nurse it is ok to have Covid booster as long as it is 2 week before surgery. Pt understood and agreed. Writer informed pt we would follow up once we get the ok to move fwd with rescheduling.     Angelita Conley on 10/8/2024 at 3:38 PM

## 2024-10-08 NOTE — TELEPHONE ENCOUNTER
Called and spoke with pt to confirm surgery is cancelled due to IV shortage. Pt understood and agreed but wanted to know if she can now get he Covid booster since surgery is postponed. Informed pt a msg would be sent to the surgeon and writer will follow up with an answer.     Angelita Conley on 10/8/2024 at 3:26 PM

## 2024-10-11 ENCOUNTER — ANESTHESIA (OUTPATIENT)
Dept: SURGERY | Facility: CLINIC | Age: 84
DRG: 804 | End: 2024-10-11
Payer: MEDICARE

## 2024-10-15 NOTE — TELEPHONE ENCOUNTER
FUTURE VISIT INFORMATION      SURGERY INFORMATION:  Date: 10/24/24  Location: [facility] OR  Surgeon:  José Miguel Nguyen MD   Anesthesia Type:  general  Procedure: Robot-assisted bilateral thoracoscopic thymectomy   Consult: ov 7/22/24     RECORDS REQUESTED FROM:         Primary Care Provider: Betty Neff P.A.-C. - [place]     Pertinent Medical History: Anterior mediastinal tumor

## 2024-10-17 ENCOUNTER — VIRTUAL VISIT (OUTPATIENT)
Dept: SURGERY | Facility: CLINIC | Age: 84
End: 2024-10-17
Payer: MEDICARE

## 2024-10-17 ENCOUNTER — PRE VISIT (OUTPATIENT)
Dept: SURGERY | Facility: CLINIC | Age: 84
End: 2024-10-17

## 2024-10-17 VITALS — HEIGHT: 66 IN | BODY MASS INDEX: 18.8 KG/M2 | WEIGHT: 117 LBS

## 2024-10-17 DIAGNOSIS — Z01.818 PREOP EXAMINATION: Primary | ICD-10-CM

## 2024-10-17 DIAGNOSIS — D49.89 THYMOMA: ICD-10-CM

## 2024-10-17 PROCEDURE — 99214 OFFICE O/P EST MOD 30 MIN: CPT | Mod: 95

## 2024-10-17 ASSESSMENT — PAIN SCALES - GENERAL: PAINLEVEL: NO PAIN (0)

## 2024-10-17 ASSESSMENT — ENCOUNTER SYMPTOMS
ORTHOPNEA: 0
SEIZURES: 0

## 2024-10-17 ASSESSMENT — LIFESTYLE VARIABLES: TOBACCO_USE: 0

## 2024-10-17 NOTE — PROGRESS NOTES
Zahida is a 84 year old who is being evaluated via a billable video visit.    How would you like to obtain your AVS? MyChart  If the video visit is dropped, the invitation should be resent by: Text to cell phone: [phone]        Subjective   Zahida is a 84 year old, presenting for the following health issues:  Pre-Op Exam      HPI         Physical Exam

## 2024-10-17 NOTE — PATIENT INSTRUCTIONS
Preparing for Your Surgery      Name:  Zahida Cespedes   MRN:  [id number]   :  1940   Today's Date:  10/17/2024       Arriving for surgery:  Surgery date:  10/24/24  Arrival time:  12:00 pm  Surgery time: 2:00 pm    Please come to:     Please come to:       [facility] [place] [facility]    [address] [place]  [address]     The [facility] ([facility] [place] Patient/Visitor Ramp is at [address]. Patients and visitors who self-park will receive the reduced hospital parking rate. If the Patient /Visitor Ramp is full, please follow the signs to the [facility] car park located at the main hospital entrance.       parking is available (24 hours/ 7 days a week)      Discounted parking pass options are available for patients and visitors. They can be purchased at the [place] desk at the main hospital entrance.     -    Stop at the security desk and they will direct surgery patients to the Surgery Check in and [facility]. [phone]        - If you need directions, a wheelchair or an escort please stop at the Information/security desk in the lobby.     What can I eat or drink?  -  You may eat and drink normally up to 8 hours prior to arrival time. (Until 4:00 am)  -  You may have clear liquids until 2 hours prior to arrival time. (Until 10:00 am)    Examples of clear liquids:  Water  Clear broth  Juices (apple, white grape, white cranberry  and cider) without pulp  Noncarbonated, powder based beverages  (lemonade and Samuel-Aid)  Sodas (Sprite, 7-Up, ginger ale and seltzer)  Coffee or tea (without milk or cream)  Gatorade    -  No Alcohol or cannabis products for at least 24 hours before surgery.     Which medicines can I take?    Hold Aspirin for 7 days before surgery.   Hold Multivitamins for 7 days before surgery.  Hold Supplements for 7 days before surgery.  Hold Ibuprofen (Advil, Motrin) for 1 day(s) before  surgery--unless otherwise directed by surgeon.  Hold Naproxen (Aleve) for 4 days before surgery.    -  DO NOT take these medications the day of surgery:  Vitamin B  Oscal  Vitamin D3  Magnesium  Topical medication on your skin (lotion, cream, gel)    -  PLEASE TAKE these medications per your usual routine:  Atorvastatin (Lipitor)  Finasteride (Proscar)  Atrovent nasal spray if needed  Mycophenolate    How do I prepare myself?  - Please take 2 showers (one the night prior to surgery and one the morning of surgery) using Scrubcare or Hibiclens soap.    Use this soap only from the neck to your toes. Do not use in genital area.     Leave the soap on your skin for one minute--then rinse thoroughly.      You may use your own shampoo and conditioner. No other hair products.     Sleep in clean sheets and wear clean clothes.   - Please remove all jewelry and body piercings.  - No lotions, deodorants or fragrance.  - No makeup or fingernail polish.   - Bring your ID and insurance card.    -If you use a CPAP machine, please bring the CPAP machine, tubing, and mask to hospital.    -If you have a Deep Brain Stimulator, Spinal Cord Stimulator, or any Neuro Stimulator device---you must bring the remote control to the hospital.      ALL PATIENTS GOING HOME THE SAME DAY OF SURGERY ARE REQUIRED TO HAVE A RESPONSIBLE ADULT TO DRIVE AND BE IN ATTENDANCE WITH THEM FOR 24 HOURS FOLLOWING SURGERY.    Covid testing policy as of 12/06/2022  Your surgeon will notify and schedule you for a COVID test if one is needed before surgery--please direct any questions or COVID symptoms to your surgeon      Questions or Concerns:    - For any questions regarding the day of surgery or your hospital stay, please contact the Pre Admission Nursing Office at [phone].       - If you have health changes between today and your surgery, please call your surgeon.       - For questions after surgery, please call your surgeons office.           Current  Visitor Guidelines    You may have 2 visitors in the pre op area.    Visiting hours: 8 a.m. to 8:30 p.m.    Patients confirmed or suspected to have symptoms of COVID 19 or flu:     No visitors allowed for adult patients.   Children (under age 18) can have 1 named visitor.     People who are sick or showing symptoms of COVID 19 or flu:    Are not allowed to visit patients--we can only make exceptions in special situations.       Please follow these guidelines for your visit:          Please maintain social distance          Masking is optional--however at times you may be asked to wear a mask for the safety of yourself and others     Clean your hands with alcohol hand . Do this when you arrive at and leave the building and patient room,    And again after you touch your mask or anything in the room.     Go directly to and from the room you are visiting.     Stay in the patient s room during your visit. Limit going to other places in the hospital as much as possible     Leave bags and jackets at home or in the car.     For everyone s health, please don t come and go during your visit. That includes for smoking   during your visit.

## 2024-10-17 NOTE — H&P
Pre-Operative H & P     CC:  Preoperative exam to assess for increased cardiopulmonary risk while undergoing surgery and anesthesia.    Date of Encounter: 10/17/2024  Primary Care Physician:  Betty Neff     Reason for visit:   Encounter Diagnoses   Name Primary?    Preop examination Yes    Thymoma        HPI  Zahida Cespedes is a 84 year old female who presents for pre-operative H & P in preparation for  Procedure Information       Case: [id number] Date/Time: 10/24/24 1400    Procedure: Robot-assisted bilateral thoracoscopic thymectomy (Bilateral: Abdomen)    Anesthesia type: General    Diagnosis: Thymoma [D49.89]    Pre-op diagnosis: Thymoma [D49.89]    Location:  OR  /  OR    Providers: José Miguel Nguyen MD            Ms. Cespedes has a PMH significant for dyslipidemia, moderate aortic stenosis, myasthenia gravis with chronic eyelid droop, scoliosis, and thymoma.  She met with Dr. Nguyen and the above surgery is now planned.     History is obtained from the patient and chart review    Hx of abnormal bleeding or anti-platelet use: Denies.    Menstrual history: No LMP recorded. Patient is postmenopausal.:      Past Medical History  Past Medical History:   Diagnosis Date    Aortic stenosis     Dyslipidemia     Myasthenia gravis without exacerbation (H)        Past Surgical History  Past Surgical History:   Procedure Laterality Date    COLONOSCOPY      skin cancer removal      TUBAL LIGATION         Prior to Admission Medications  Current Outpatient Medications   Medication Sig Dispense Refill    atorvastatin (LIPITOR) 20 MG tablet Take 20 mg by mouth every morning.      B Complex Vitamins (VITAMIN B COMPLEX PO) Take 1 capsule by mouth every morning.      calcium carbonate (OS-MARY) 500 MG tablet Take 1 tablet by mouth every morning.      Cholecalciferol (VITAMIN D3) 2000 UNITS CAPS Take 2,000 Units by mouth every morning.      clobetasol (TEMOVATE) 0.05 % external ointment Apply topically Every Mon, Wed,  Fri Morning.      Echinacea 650 MG CAPS Take 1 mg by mouth every morning.      estradiol (ESTRACE) 0.1 MG/GM vaginal cream Place vaginally Every Mon, Wed, Fri Morning.      finasteride (PROSCAR) 5 MG tablet Take 5 mg by mouth every morning.      ipratropium (ATROVENT) 0.03 % nasal spray Spray 2 sprays in nostril 3 times daily as needed      Lutein 6 MG CAPS Take 1 capsule by mouth every morning.      Magnesium 400 MG CAPS Take 1 capsule by mouth every morning.      Multiple Vitamin (MULTI-VITAMINS) TABS Take 1 tablet by mouth every morning.      mycophenolate (GENERIC EQUIVALENT) 500 MG tablet Take 1 tablet (500 mg) by mouth daily (Patient taking differently: Take 500 mg by mouth every morning.) 90 tablet 3    Omega-3 Fatty Acids (FISH OIL CONCENTRATE) 300 MG CAPS Take 1,000 mg by mouth every morning.         Allergies  Allergies   Allergen Reactions    Bee Venom      Other reaction(s): Edema  Swelling @ sting site  Other reaction(s): Other (see comments)  No reaction listed in [employer]    Ciprofloxacin Nausea and Vomiting     Myasthenia gravis       Social History  Social History     Socioeconomic History    Marital status:      Spouse name: Not on file    Number of children: Not on file    Years of education: Not on file    Highest education level: Not on file   Occupational History    Not on file   Tobacco Use    Smoking status: Never    Smokeless tobacco: Never   Substance and Sexual Activity    Alcohol use: Yes     Alcohol/week: 1.0 standard drink of alcohol     Types: 1 Glasses of wine per week     Comment: 1/4 glass wine 3-4x week    Drug use: Never    Sexual activity: Not Currently     Partners: Male   Other Topics Concern    Not on file   Social History Narrative    Not on file     Social Determinants of Health     Financial Resource Strain: Low Risk  (6/8/2023)    [facility]    Overall Financial Resource Strain (CARDIA)     Difficulty of Paying Living Expenses: Not hard at  all   Food Insecurity: No Food Insecurity (6/2/2024)    Received from [facility]    Hunger Vital Sign     Worried About Running Out of Food in the Last Year: Never true     Ran Out of Food in the Last Year: Never true   Transportation Needs: No Transportation Needs (6/2/2024)    Received from [facility]    PRAPARE - Transportation     Lack of Transportation (Medical): No     Lack of Transportation (Non-Medical): No   Physical Activity: Sufficiently Active (6/2/2024)    Received from [facility]    Exercise Vital Sign     Days of Exercise per Week: 7 days     Minutes of Exercise per Session: 40 min   Stress: No Stress Concern Present (3/30/2023)    [facility]    [employer] - Occupational Stress Questionnaire     Feeling of Stress : Not at all   Social Connections: Moderately Isolated (3/30/2023)    [facility] from [facility], [facility]    Social Connection and Isolation Panel [NHANES]     Frequency of Communication with Friends and Family: More than three times a week     Frequency of Social Gatherings with Friends and Family: Once a week     Attends Gnosticist Services: Never     Active Member of Clubs or Organizations: No     Attends Club or Organization Meetings: Never     Marital Status:    Interpersonal Safety: Not At Risk (6/8/2023)    [facility]    Humiliation, Afraid, Rape, and Kick questionnaire     Fear of Current or Ex-Partner: No     Emotionally Abused: No     Physically Abused: No     Sexually Abused: No   Housing Stability: Low Risk  (6/2/2024)    Received from [facility]    Housing Stability     What is your living situation today?: I have a steady place to live       Family History  Family History   Problem Relation Age of Onset    Anesthesia Reaction No family hx of     Venous thrombosis No family hx of        Review of Systems  The complete review of systems is negative other than noted in the HPI or  here.   Anesthesia Evaluation   Pt has had prior anesthetic.     No history of anesthetic complications       ROS/MED HX  ENT/Pulmonary:  - neg pulmonary ROS  (-) tobacco use, asthma and sleep apnea   Neurologic:  - neg neurologic ROS  (-) no seizures and no CVA   Cardiovascular:     (+) Dyslipidemia - -   -  - -                           valvular problems/murmurs type: AS modearte.    Previous cardiac testing   Echo: Date: 11/16/2022 Results:  Final Impressions   1. Transthoracic outreach echo interpretation.   2. Moderate calcific aortic valve stenosis, systolic mean Doppler gradient 19 mmHg, valve area by Doppler 1.2 cm2.   3. Normal left ventricular chamber size, no regional wall motion abnormalities, calculated 2-D biplane volumetric ejection fraction of 64%.   4. Abnormal left ventricular geometry with  concentric left ventricular hypertrophy, grade 1/3 diastolic dysfunction, consistent with low to normal filling pressure.   5. Normal right ventricular chamber size, normal systolic function, estimated right ventricular systolic pressure 24 mmHg (right atrial pressure of 5 mmHg).   6. Tiny anterior pericardial effusion.   7. Compared to the report of 10/13/2011 the following changes have occurred: aortic stenosis is now present; decreased left ventricular filling pressure.  Side by side comparison of images performed.   8. Repeat transthoracic echocardiogram is recommended in 1-2 years, according to practice guidelines.     Echo 10/2/24:  Interpretation Summary     Moderate valvular aortic stenosis.  The mean AoV pressure gradient is 29mmHg.  The calculated aortic valve area is 1.0cm2.  The visual ejection fraction is 60-65%.      Stress Test:  Date: Results:    ECG Reviewed:  Date: Results:    Cath:  Date: Results:   (-) hypertension, DEUTSCH and orthopnea/PND   METS/Exercise Tolerance: >4 METS Comment: Good exercise tolerance, exercises regularly with yoga, 30 mins on seated elliptical, and weight-bearing  exercises without any exertional symptoms.    Hematologic:  - neg hematologic  ROS  (-) history of blood clots and history of blood transfusion   Musculoskeletal: Comment: - Scoliosis. Denies chronic pain.       GI/Hepatic:  - neg GI/hepatic ROS  (-) GERD   Renal/Genitourinary: Comment: - Reports nocturnal enuresis. She is currently completing pelvic floor PT. Wearing depends.  - neg Renal ROS     Endo:  - neg endo ROS     Psychiatric/Substance Use:  - neg psychiatric ROS     Infectious Disease:  - neg infectious disease ROS     Malignancy:   (+) Malignancy, History of Other and Skin.Skin CA Remission status post Surgery.  Other CA Thymoma Active status post.    Other: Comment: Myasthenia gravis with chronic eyelid droop in remission - neg other ROS          Virtual visit -  No vitals were obtained    Physical Exam  Constitutional: Awake, alert, cooperative, no apparent distress, and appears stated age.  Eyes: Pupils equal  HENT: Normocephalic  Respiratory: non labored breathing   Neurologic: Awake, alert, oriented to name, place and time.   Neuropsychiatric: Calm, cooperative. Normal affect.      Prior Labs/Diagnostic Studies   All labs and imaging personally reviewed     Component      Latest Ref Rng 9/16/2024  12:41 PM   WBC      4.0 - 11.0 10e3/uL 6.1    RBC Count      3.80 - 5.20 10e6/uL 4.29    Hemoglobin      11.7 - 15.7 g/dL 14.4    Hematocrit      35.0 - 47.0 % 42.0    MCV      78 - 100 fL 98    MCH      26.5 - 33.0 pg 33.6 (H)    MCHC      31.5 - 36.5 g/dL 34.3    RDW      10.0 - 15.0 % 12.6    Platelet Count      150 - 450 10e3/uL 185    % Neutrophils      % 65    % Lymphocytes      % 27    % Monocytes      % 8    % Eosinophils      % 0    % Basophils      % 0    % Immature Granulocytes      % 0    NRBCs per 100 WBC      <1 /100 0    Absolute Neutrophils      1.6 - 8.3 10e3/uL 3.9    Absolute Lymphocytes      0.8 - 5.3 10e3/uL 1.6    Absolute Monocytes      0.0 - 1.3 10e3/uL 0.5    Absolute Eosinophils       0.0 - 0.7 10e3/uL 0.0    Absolute Basophils      0.0 - 0.2 10e3/uL 0.0    Absolute Immature Granulocytes      <=0.4 10e3/uL 0.0    Absolute NRBCs      10e3/uL 0.0    Sodium      135 - 145 mmol/L 135    Potassium      3.4 - 5.3 mmol/L 4.2    Chloride      98 - 107 mmol/L 98    Carbon Dioxide (CO2)      22 - 29 mmol/L 29    Anion Gap      7 - 15 mmol/L 8    Urea Nitrogen      8.0 - 23.0 mg/dL 17.2    Creatinine      0.51 - 0.95 mg/dL 0.77    GFR Estimate      >60 mL/min/1.73m2 75    Calcium      8.8 - 10.4 mg/dL 9.2    Glucose      70 - 99 mg/dL 93       Legend:  (H) High    EKG/ stress test - if available please see in ROS above   Echo result w/o MOPS: Interpretation Summary Moderate valvular aortic stenosis.The mean AoV pressure gradient is 29mmHg.The calculated aortic valve area is 1.0cm2.The visual ejection fraction is 60-65%.          Latest Ref Rng & Units 9/16/2024    10:35 AM   PFT   FVC L 2.94    FEV1 L 2.27    FVC% % 126    FEV1% % 128          The patient's records and results personally reviewed by this provider.     Outside records reviewed from: Care Everywhere      Assessment    Zahida Cespedes is a 84 year old female seen as a PAC referral for risk assessment and optimization for anesthesia.    Plan/Recommendations  Pt will be optimized for the proposed procedure.  See below for details on the assessment, risk, and preoperative recommendations    NEUROLOGY  - No history of TIA, CVA or seizure    -Post Op delirium risk factors:  Age    - acetylcholine receptor antibody positive, predominantly ocular myasthenia gravis in remission. very well controlled on Cellcept only 500 mg daily. Last visit with neuro 9/9/24. Did not recommend IVIG before upcoming surgery as MG is in remission    ENT  - No current airway concerns.  Will need to be reassessed day of surgery.  Mallampati: Unable to assess  TM: Unable to assess    CARDIAC  - Hyperlipidemia  - Moderate aortic stenosis. Per recent echo 10/2/24: Moderate  "valvular aortic stenosis. The mean AoV pressure gradient is 29mmHg. The calculated aortic valve area is 1.0cm2. The visual ejection fraction is 60-65%. Today, she reports remaining very physically active without any CP, chest tightness, or SOB. She also denies any syncope, orthopnea, PND, or LE edema.     - METS (Metabolic Equivalents)  Patient performs 4 or more METS exercise without symptoms             Total Score: 0      RCRI-Low risk: Class 2 0.9% complication rate             Total Score: 1    RCRI: High Risk Surgery        PULMONARY  AZIZA Low Risk             Total Score: 1    AZIZA: Over 50 ys old      - Denies asthma or inhaler use  - Tobacco History    History   Smoking Status    Never   Smokeless Tobacco    Never       GI  PONV High Risk  Total Score: 3           1 AN PONV: Pt is Female    1 AN PONV: Patient is not a current smoker    1 AN PONV: Intended Post Op Opioids        /RENAL  - Baseline Creatinine  0.77    - Reports nocturnal enuresis. She is currently completing pelvic floor PT. Wearing depends.     ENDOCRINE    - BMI: Estimated body mass index is 19.34 kg/m  as calculated from the following:    Height as of 9/16/24: 1.664 m (5' 5.5\").    Weight as of 9/16/24: 53.5 kg (118 lb).  Healthy Weight (BMI 18.5-24.9)  - No history of Diabetes Mellitus    HEME  VTE Medium Risk 1.8%             Total Score: 6    VTE: Greater than 59 yrs old    VTE: Current cancer      - No history of abnormal bleeding or antiplatelet use.  - Will update T&S prior to surgery.     MSK  Patient is NOT Frail             Total Score: 0      - Scoliosis. Denies chronic back pain.       Different anesthesia methods/types have been discussed with the patient, but they are aware that the final plan will be decided by the assigned anesthesia provider on the date of service.    The patient is optimized for their procedure. AVS with information on surgery time/arrival time, meds and NPO status given by nursing staff. No further " diagnostic testing indicated.    Please refer to the physical examination documented by the anesthesiologist in the anesthesia record on the day of surgery.    Video-Visit Details    Type of service:  Video Visit    Provider received verbal consent for a Video Visit from the patient? Yes     Originating Location (pt. Location): Home    Distant Location (provider location):  Off-site  Mode of Communication:  Video Conference via [employer]  On the day of service:     Prep time: 10 minutes  Visit time: 10 minutes  Documentation time: 10 minutes  ------------------------------------------  Total time: 30 minutes      SANTIAGO Xavier CNP  Preoperative Assessment Center  [facility]  Cl[facility] and [facility] Center  Phone: [phone]  Fax: [phone]

## 2024-10-20 LAB
ABO/RH(D): NORMAL
ANTIBODY SCREEN: NEGATIVE
SPECIMEN EXPIRATION DATE: NORMAL

## 2024-10-21 ENCOUNTER — LAB (OUTPATIENT)
Dept: LAB | Facility: CLINIC | Age: 84
End: 2024-10-21
Payer: MEDICARE

## 2024-10-21 DIAGNOSIS — D49.89 THYMOMA: ICD-10-CM

## 2024-10-21 DIAGNOSIS — Z01.818 PREOP EXAMINATION: ICD-10-CM

## 2024-10-21 PROCEDURE — 86900 BLOOD TYPING SEROLOGIC ABO: CPT

## 2024-10-21 PROCEDURE — 36415 COLL VENOUS BLD VENIPUNCTURE: CPT

## 2024-10-21 PROCEDURE — 86850 RBC ANTIBODY SCREEN: CPT

## 2024-10-21 PROCEDURE — 86901 BLOOD TYPING SEROLOGIC RH(D): CPT

## 2024-10-23 RX ORDER — OXYCODONE HYDROCHLORIDE 10 MG/1
10 TABLET ORAL
Status: CANCELLED | OUTPATIENT
Start: 2024-10-23

## 2024-10-23 RX ORDER — ONDANSETRON 2 MG/ML
4 INJECTION INTRAMUSCULAR; INTRAVENOUS EVERY 30 MIN PRN
Status: CANCELLED | OUTPATIENT
Start: 2024-10-23

## 2024-10-23 RX ORDER — ONDANSETRON 4 MG/1
4 TABLET, ORALLY DISINTEGRATING ORAL EVERY 30 MIN PRN
Status: CANCELLED | OUTPATIENT
Start: 2024-10-23

## 2024-10-23 RX ORDER — DEXAMETHASONE SODIUM PHOSPHATE 4 MG/ML
4 INJECTION, SOLUTION INTRA-ARTICULAR; INTRALESIONAL; INTRAMUSCULAR; INTRAVENOUS; SOFT TISSUE
Status: CANCELLED | OUTPATIENT
Start: 2024-10-23

## 2024-10-23 RX ORDER — OXYCODONE HYDROCHLORIDE 5 MG/1
5 TABLET ORAL
Status: CANCELLED | OUTPATIENT
Start: 2024-10-23

## 2024-10-23 RX ORDER — NALOXONE HYDROCHLORIDE 0.4 MG/ML
0.1 INJECTION, SOLUTION INTRAMUSCULAR; INTRAVENOUS; SUBCUTANEOUS
Status: CANCELLED | OUTPATIENT
Start: 2024-10-23

## 2024-10-23 ASSESSMENT — ENCOUNTER SYMPTOMS
ORTHOPNEA: 0
SEIZURES: 0

## 2024-10-23 ASSESSMENT — LIFESTYLE VARIABLES: TOBACCO_USE: 0

## 2024-10-23 NOTE — ANESTHESIA PREPROCEDURE EVALUATION
Anesthesia Pre-Procedure Evaluation    Patient: Zahida Cespedes   MRN: [id number] : 1940        Procedure : Procedure(s):  Robot-assisted bilateral thoracoscopic thymectomy          Past Medical History:   Diagnosis Date    Aortic stenosis     Dyslipidemia     Myasthenia gravis without exacerbation (H)       Past Surgical History:   Procedure Laterality Date    COLONOSCOPY      skin cancer removal      TUBAL LIGATION        Allergies   Allergen Reactions    Bee Venom      Other reaction(s): Edema  Swelling @ sting site  Other reaction(s): Other (see comments)  No reaction listed in [employer]    Ciprofloxacin Nausea and Vomiting     Myasthenia gravis      Social History     Tobacco Use    Smoking status: Never    Smokeless tobacco: Never   Substance Use Topics    Alcohol use: Yes     Alcohol/week: 1.0 standard drink of alcohol     Types: 1 Glasses of wine per week     Comment:  glass wine 1-2x week      Wt Readings from Last 1 Encounters:   10/17/24 53.1 kg (117 lb)        Anesthesia Evaluation   Pt has had prior anesthetic.     No history of anesthetic complications       ROS/MED HX  ENT/Pulmonary:  - neg pulmonary ROS  (-) tobacco use, asthma and sleep apnea   Neurologic:  - neg neurologic ROS  (-) no seizures and no CVA   Cardiovascular:     (+) Dyslipidemia - -   -  - -                           valvular problems/murmurs type: AS modearte.    Previous cardiac testing   Echo: Date: 2022 Results:  Final Impressions   1. Transthoracic outreach echo interpretation.   2. Moderate calcific aortic valve stenosis, systolic mean Doppler gradient 19 mmHg, valve area by Doppler 1.2 cm2.   3. Normal left ventricular chamber size, no regional wall motion abnormalities, calculated 2-D biplane volumetric ejection fraction of 64%.   4. Abnormal left ventricular geometry with  concentric left ventricular hypertrophy, grade 1/3 diastolic dysfunction, consistent with low to normal filling pressure.   5. Normal right  ventricular chamber size, normal systolic function, estimated right ventricular systolic pressure 24 mmHg (right atrial pressure of 5 mmHg).   6. Tiny anterior pericardial effusion.   7. Compared to the report of 10/13/2011 the following changes have occurred: aortic stenosis is now present; decreased left ventricular filling pressure.  Side by side comparison of images performed.   8. Repeat transthoracic echocardiogram is recommended in 1-2 years, according to practice guidelines.     Echo 10/2/24:  Interpretation Summary     Moderate valvular aortic stenosis.  The mean AoV pressure gradient is 29mmHg.  The calculated aortic valve area is 1.0cm2.  The visual ejection fraction is 60-65%.      Stress Test:  Date: Results:    ECG Reviewed:  Date: Results:    Cath:  Date: Results:   (-) hypertension, DEUTSCH and orthopnea/PND   METS/Exercise Tolerance: >4 METS Comment: Good exercise tolerance, exercises regularly with yoga, 30 mins on seated elliptical, and weight-bearing exercises without any exertional symptoms.    Hematologic:  - neg hematologic  ROS  (-) history of blood clots and history of blood transfusion   Musculoskeletal: Comment: - Scoliosis. Denies chronic pain.       GI/Hepatic:  - neg GI/hepatic ROS  (-) GERD   Renal/Genitourinary: Comment: - Reports nocturnal enuresis. She is currently completing pelvic floor PT. Wearing depends.  - neg Renal ROS     Endo:  - neg endo ROS     Psychiatric/Substance Use:  - neg psychiatric ROS     Infectious Disease:  - neg infectious disease ROS     Malignancy:   (+) Malignancy, History of Other and Skin.Skin CA Remission status post Surgery.  Other CA Thymoma Active status post.    Other: Comment: Myasthenia gravis with chronic eyelid droop in remission - neg other ROS          Physical Exam    Airway        Mallampati: II   TM distance: > 3 FB   Neck ROM: full   Mouth opening: > 3 cm    Respiratory Devices and Support         Dental       (+) Multiple crowns, permanant  "bridges      Cardiovascular   cardiovascular exam normal          Pulmonary   pulmonary exam normal                OUTSIDE LABS:  CBC:   Lab Results   Component Value Date    WBC 6.1 09/16/2024    WBC 5.3 01/20/2020    HGB 14.4 09/16/2024    HGB 14.5 01/20/2020    HCT 42.0 09/16/2024    HCT 44.3 01/20/2020     09/16/2024     01/20/2020     BMP:   Lab Results   Component Value Date     09/16/2024    POTASSIUM 4.2 09/16/2024    CHLORIDE 98 09/16/2024    CO2 29 09/16/2024    BUN 17.2 09/16/2024    CR 0.9 09/16/2024    CR 0.77 09/16/2024    GLC 93 09/16/2024     COAGS: No results found for: \"PTT\", \"INR\", \"FIBR\"  POC: No results found for: \"BGM\", \"HCG\", \"HCGS\"  HEPATIC:   Lab Results   Component Value Date    ALT 28 01/20/2020    AST 15 01/20/2020     OTHER:   Lab Results   Component Value Date    MARY 9.2 09/16/2024       Anesthesia Plan    ASA Status:  3    NPO Status:  NPO Appropriate    Anesthesia Type: General.     - Airway: ETT   Induction: Intravenous.   Maintenance: Balanced.   Techniques and Equipment:     - Lines/Monitors: 2nd IV, BIS, Arterial Line     Consents    Anesthesia Plan(s) and associated risks, benefits, and realistic alternatives discussed. Questions answered and patient/representative(s) expressed understanding.     - Discussed:     - Discussed with:  Patient      - Extended Intubation/Ventilatory Support Discussed: No.      - Patient is DNR/DNI Status: No     Use of blood products discussed: Yes.     - Discussed with: Patient.     Postoperative Care    Pain management: IV analgesics.   PONV prophylaxis: Ondansetron (or other 5HT-3), Dexamethasone or Solumedrol     Comments:               Xu Coe MD    I have reviewed the pertinent notes and labs in the chart from the past 30 days and (re)examined the patient.  Any updates or changes from those notes are reflected in this note.                                 "

## 2024-10-24 ENCOUNTER — APPOINTMENT (OUTPATIENT)
Dept: GENERAL RADIOLOGY | Facility: CLINIC | Age: 84
DRG: 804 | End: 2024-10-24
Payer: MEDICARE

## 2024-10-24 ENCOUNTER — HOSPITAL ENCOUNTER (INPATIENT)
Facility: CLINIC | Age: 84
LOS: 2 days | Discharge: HOME | DRG: 804 | End: 2024-10-26
Attending: THORACIC SURGERY (CARDIOTHORACIC VASCULAR SURGERY) | Admitting: THORACIC SURGERY (CARDIOTHORACIC VASCULAR SURGERY)
Payer: MEDICARE

## 2024-10-24 DIAGNOSIS — D49.89 THYMOMA: Primary | ICD-10-CM

## 2024-10-24 LAB
CREAT SERPL-MCNC: 0.78 MG/DL (ref 0.51–0.95)
EGFRCR SERPLBLD CKD-EPI 2021: 74 ML/MIN/1.73M2

## 2024-10-24 PROCEDURE — 88307 TISSUE EXAM BY PATHOLOGIST: CPT | Mod: TC | Performed by: THORACIC SURGERY (CARDIOTHORACIC VASCULAR SURGERY)

## 2024-10-24 PROCEDURE — 999N000065 XR CHEST PORT 1 VIEW

## 2024-10-24 PROCEDURE — 250N000009 HC RX 250: Performed by: NURSE ANESTHETIST, CERTIFIED REGISTERED

## 2024-10-24 PROCEDURE — 250N000011 HC RX IP 250 OP 636: Mod: JZ | Performed by: ANESTHESIOLOGY

## 2024-10-24 PROCEDURE — 99100 ANES PT EXTEME AGE<1 YR&>70: CPT | Performed by: ANESTHESIOLOGY

## 2024-10-24 PROCEDURE — 258N000003 HC RX IP 258 OP 636

## 2024-10-24 PROCEDURE — 250N000011 HC RX IP 250 OP 636: Performed by: THORACIC SURGERY (CARDIOTHORACIC VASCULAR SURGERY)

## 2024-10-24 PROCEDURE — 258N000003 HC RX IP 258 OP 636: Performed by: ANESTHESIOLOGY

## 2024-10-24 PROCEDURE — 250N000025 HC SEVOFLURANE, PER MIN: Performed by: THORACIC SURGERY (CARDIOTHORACIC VASCULAR SURGERY)

## 2024-10-24 PROCEDURE — 36415 COLL VENOUS BLD VENIPUNCTURE: CPT

## 2024-10-24 PROCEDURE — 250N000009 HC RX 250

## 2024-10-24 PROCEDURE — 999N000141 HC STATISTIC PRE-PROCEDURE NURSING ASSESSMENT: Performed by: THORACIC SURGERY (CARDIOTHORACIC VASCULAR SURGERY)

## 2024-10-24 PROCEDURE — 250N000011 HC RX IP 250 OP 636

## 2024-10-24 PROCEDURE — 32673 THORACOSCOPY W/THYMUS RESECT: CPT | Performed by: NURSE ANESTHETIST, CERTIFIED REGISTERED

## 2024-10-24 PROCEDURE — 32673 THORACOSCOPY W/THYMUS RESECT: CPT | Mod: GC | Performed by: THORACIC SURGERY (CARDIOTHORACIC VASCULAR SURGERY)

## 2024-10-24 PROCEDURE — 250N000011 HC RX IP 250 OP 636: Performed by: STUDENT IN AN ORGANIZED HEALTH CARE EDUCATION/TRAINING PROGRAM

## 2024-10-24 PROCEDURE — 88307 TISSUE EXAM BY PATHOLOGIST: CPT | Mod: 26 | Performed by: PATHOLOGY

## 2024-10-24 PROCEDURE — 250N000013 HC RX MED GY IP 250 OP 250 PS 637

## 2024-10-24 PROCEDURE — 272N000001 HC OR GENERAL SUPPLY STERILE: Performed by: THORACIC SURGERY (CARDIOTHORACIC VASCULAR SURGERY)

## 2024-10-24 PROCEDURE — 07TM4ZZ RESECTION OF THYMUS, PERCUTANEOUS ENDOSCOPIC APPROACH: ICD-10-PCS | Performed by: THORACIC SURGERY (CARDIOTHORACIC VASCULAR SURGERY)

## 2024-10-24 PROCEDURE — 272N000002 HC OR SUPPLY OTHER OPNP: Performed by: THORACIC SURGERY (CARDIOTHORACIC VASCULAR SURGERY)

## 2024-10-24 PROCEDURE — 120N000002 HC R&B MED SURG/OB UMMC

## 2024-10-24 PROCEDURE — 82565 ASSAY OF CREATININE: CPT

## 2024-10-24 PROCEDURE — 370N000017 HC ANESTHESIA TECHNICAL FEE, PER MIN: Performed by: THORACIC SURGERY (CARDIOTHORACIC VASCULAR SURGERY)

## 2024-10-24 PROCEDURE — 8E094CZ ROBOTIC ASSISTED PROCEDURE OF HEAD AND NECK REGION, PERCUTANEOUS ENDOSCOPIC APPROACH: ICD-10-PCS | Performed by: THORACIC SURGERY (CARDIOTHORACIC VASCULAR SURGERY)

## 2024-10-24 PROCEDURE — 71045 X-RAY EXAM CHEST 1 VIEW: CPT | Mod: 26 | Performed by: RADIOLOGY

## 2024-10-24 PROCEDURE — 250N000011 HC RX IP 250 OP 636: Performed by: NURSE ANESTHETIST, CERTIFIED REGISTERED

## 2024-10-24 PROCEDURE — 99100 ANES PT EXTEME AGE<1 YR&>70: CPT | Performed by: NURSE ANESTHETIST, CERTIFIED REGISTERED

## 2024-10-24 PROCEDURE — 32673 THORACOSCOPY W/THYMUS RESECT: CPT | Performed by: ANESTHESIOLOGY

## 2024-10-24 PROCEDURE — 250N000009 HC RX 250: Performed by: THORACIC SURGERY (CARDIOTHORACIC VASCULAR SURGERY)

## 2024-10-24 PROCEDURE — S2900 ROBOTIC SURGICAL SYSTEM: HCPCS | Performed by: THORACIC SURGERY (CARDIOTHORACIC VASCULAR SURGERY)

## 2024-10-24 PROCEDURE — 360N000080 HC SURGERY LEVEL 7, PER MIN: Performed by: THORACIC SURGERY (CARDIOTHORACIC VASCULAR SURGERY)

## 2024-10-24 PROCEDURE — 710N000010 HC RECOVERY PHASE 1, LEVEL 2, PER MIN: Performed by: THORACIC SURGERY (CARDIOTHORACIC VASCULAR SURGERY)

## 2024-10-24 RX ORDER — ENOXAPARIN SODIUM 100 MG/ML
40 INJECTION SUBCUTANEOUS EVERY 24 HOURS
Status: DISCONTINUED | OUTPATIENT
Start: 2024-10-25 | End: 2024-10-26 | Stop reason: HOSPADM

## 2024-10-24 RX ORDER — HYDROMORPHONE HCL IN WATER/PF 6 MG/30 ML
0.2 PATIENT CONTROLLED ANALGESIA SYRINGE INTRAVENOUS EVERY 4 HOURS PRN
Status: DISCONTINUED | OUTPATIENT
Start: 2024-10-24 | End: 2024-10-26

## 2024-10-24 RX ORDER — DEXAMETHASONE SODIUM PHOSPHATE 4 MG/ML
INJECTION, SOLUTION INTRA-ARTICULAR; INTRALESIONAL; INTRAMUSCULAR; INTRAVENOUS; SOFT TISSUE PRN
Status: DISCONTINUED | OUTPATIENT
Start: 2024-10-24 | End: 2024-10-24

## 2024-10-24 RX ORDER — BISACODYL 10 MG
10 SUPPOSITORY, RECTAL RECTAL DAILY PRN
Status: DISCONTINUED | OUTPATIENT
Start: 2024-10-27 | End: 2024-10-26 | Stop reason: HOSPADM

## 2024-10-24 RX ORDER — FENTANYL CITRATE 50 UG/ML
25 INJECTION, SOLUTION INTRAMUSCULAR; INTRAVENOUS EVERY 5 MIN PRN
Status: DISCONTINUED | OUTPATIENT
Start: 2024-10-24 | End: 2024-10-24 | Stop reason: HOSPADM

## 2024-10-24 RX ORDER — ONDANSETRON 4 MG/1
4 TABLET, ORALLY DISINTEGRATING ORAL EVERY 30 MIN PRN
Status: DISCONTINUED | OUTPATIENT
Start: 2024-10-24 | End: 2024-10-24 | Stop reason: HOSPADM

## 2024-10-24 RX ORDER — FENTANYL CITRATE 50 UG/ML
INJECTION, SOLUTION INTRAMUSCULAR; INTRAVENOUS PRN
Status: DISCONTINUED | OUTPATIENT
Start: 2024-10-24 | End: 2024-10-24

## 2024-10-24 RX ORDER — POLYETHYLENE GLYCOL 3350 17 G/17G
17 POWDER, FOR SOLUTION ORAL DAILY
Status: DISCONTINUED | OUTPATIENT
Start: 2024-10-25 | End: 2024-10-26 | Stop reason: HOSPADM

## 2024-10-24 RX ORDER — PROCHLORPERAZINE MALEATE 5 MG/1
5 TABLET ORAL EVERY 6 HOURS PRN
Status: DISCONTINUED | OUTPATIENT
Start: 2024-10-24 | End: 2024-10-26 | Stop reason: HOSPADM

## 2024-10-24 RX ORDER — GLYCOPYRROLATE 0.2 MG/ML
INJECTION, SOLUTION INTRAMUSCULAR; INTRAVENOUS PRN
Status: DISCONTINUED | OUTPATIENT
Start: 2024-10-24 | End: 2024-10-24

## 2024-10-24 RX ORDER — FENTANYL CITRATE 50 UG/ML
50 INJECTION, SOLUTION INTRAMUSCULAR; INTRAVENOUS EVERY 5 MIN PRN
Status: DISCONTINUED | OUTPATIENT
Start: 2024-10-24 | End: 2024-10-24 | Stop reason: HOSPADM

## 2024-10-24 RX ORDER — NALOXONE HYDROCHLORIDE 0.4 MG/ML
0.4 INJECTION, SOLUTION INTRAMUSCULAR; INTRAVENOUS; SUBCUTANEOUS
Status: DISCONTINUED | OUTPATIENT
Start: 2024-10-24 | End: 2024-10-26 | Stop reason: HOSPADM

## 2024-10-24 RX ORDER — ACETAMINOPHEN 325 MG/1
975 TABLET ORAL EVERY 8 HOURS
Status: DISCONTINUED | OUTPATIENT
Start: 2024-10-24 | End: 2024-10-25

## 2024-10-24 RX ORDER — ACETAMINOPHEN 325 MG/1
650 TABLET ORAL EVERY 4 HOURS PRN
Status: DISCONTINUED | OUTPATIENT
Start: 2024-10-27 | End: 2024-10-26 | Stop reason: HOSPADM

## 2024-10-24 RX ORDER — ONDANSETRON 4 MG/1
4 TABLET, ORALLY DISINTEGRATING ORAL EVERY 6 HOURS PRN
Status: DISCONTINUED | OUTPATIENT
Start: 2024-10-24 | End: 2024-10-26 | Stop reason: HOSPADM

## 2024-10-24 RX ORDER — DEXAMETHASONE SODIUM PHOSPHATE 4 MG/ML
4 INJECTION, SOLUTION INTRA-ARTICULAR; INTRALESIONAL; INTRAMUSCULAR; INTRAVENOUS; SOFT TISSUE
Status: DISCONTINUED | OUTPATIENT
Start: 2024-10-24 | End: 2024-10-24 | Stop reason: HOSPADM

## 2024-10-24 RX ORDER — NALOXONE HYDROCHLORIDE 0.4 MG/ML
0.2 INJECTION, SOLUTION INTRAMUSCULAR; INTRAVENOUS; SUBCUTANEOUS
Status: DISCONTINUED | OUTPATIENT
Start: 2024-10-24 | End: 2024-10-26 | Stop reason: HOSPADM

## 2024-10-24 RX ORDER — EPHEDRINE SULFATE 50 MG/ML
INJECTION, SOLUTION INTRAMUSCULAR; INTRAVENOUS; SUBCUTANEOUS PRN
Status: DISCONTINUED | OUTPATIENT
Start: 2024-10-24 | End: 2024-10-24

## 2024-10-24 RX ORDER — AMOXICILLIN 250 MG
1 CAPSULE ORAL 2 TIMES DAILY
Status: DISCONTINUED | OUTPATIENT
Start: 2024-10-24 | End: 2024-10-26 | Stop reason: HOSPADM

## 2024-10-24 RX ORDER — ONDANSETRON 2 MG/ML
4 INJECTION INTRAMUSCULAR; INTRAVENOUS EVERY 30 MIN PRN
Status: DISCONTINUED | OUTPATIENT
Start: 2024-10-24 | End: 2024-10-24 | Stop reason: HOSPADM

## 2024-10-24 RX ORDER — ENOXAPARIN SODIUM 100 MG/ML
40 INJECTION SUBCUTANEOUS
Status: COMPLETED | OUTPATIENT
Start: 2024-10-24 | End: 2024-10-24

## 2024-10-24 RX ORDER — ONDANSETRON 2 MG/ML
INJECTION INTRAMUSCULAR; INTRAVENOUS PRN
Status: DISCONTINUED | OUTPATIENT
Start: 2024-10-24 | End: 2024-10-24

## 2024-10-24 RX ORDER — CEFAZOLIN SODIUM/WATER 2 G/20 ML
2 SYRINGE (ML) INTRAVENOUS
Status: DISCONTINUED | OUTPATIENT
Start: 2024-10-24 | End: 2024-10-24 | Stop reason: HOSPADM

## 2024-10-24 RX ORDER — CEFAZOLIN SODIUM/WATER 2 G/20 ML
2 SYRINGE (ML) INTRAVENOUS SEE ADMIN INSTRUCTIONS
Status: DISCONTINUED | OUTPATIENT
Start: 2024-10-24 | End: 2024-10-24 | Stop reason: HOSPADM

## 2024-10-24 RX ORDER — NALOXONE HYDROCHLORIDE 0.4 MG/ML
0.1 INJECTION, SOLUTION INTRAMUSCULAR; INTRAVENOUS; SUBCUTANEOUS
Status: DISCONTINUED | OUTPATIENT
Start: 2024-10-24 | End: 2024-10-24 | Stop reason: HOSPADM

## 2024-10-24 RX ORDER — HYDRALAZINE HYDROCHLORIDE 20 MG/ML
10 INJECTION INTRAMUSCULAR; INTRAVENOUS EVERY 30 MIN PRN
Status: DISCONTINUED | OUTPATIENT
Start: 2024-10-24 | End: 2024-10-26 | Stop reason: HOSPADM

## 2024-10-24 RX ORDER — ONDANSETRON 2 MG/ML
4 INJECTION INTRAMUSCULAR; INTRAVENOUS EVERY 6 HOURS PRN
Status: DISCONTINUED | OUTPATIENT
Start: 2024-10-24 | End: 2024-10-26 | Stop reason: HOSPADM

## 2024-10-24 RX ORDER — LIDOCAINE HYDROCHLORIDE 20 MG/ML
INJECTION, SOLUTION INFILTRATION; PERINEURAL PRN
Status: DISCONTINUED | OUTPATIENT
Start: 2024-10-24 | End: 2024-10-24

## 2024-10-24 RX ORDER — HYDROMORPHONE HCL IN WATER/PF 6 MG/30 ML
0.2 PATIENT CONTROLLED ANALGESIA SYRINGE INTRAVENOUS EVERY 5 MIN PRN
Status: DISCONTINUED | OUTPATIENT
Start: 2024-10-24 | End: 2024-10-24 | Stop reason: HOSPADM

## 2024-10-24 RX ORDER — SODIUM CHLORIDE, SODIUM LACTATE, POTASSIUM CHLORIDE, CALCIUM CHLORIDE 600; 310; 30; 20 MG/100ML; MG/100ML; MG/100ML; MG/100ML
INJECTION, SOLUTION INTRAVENOUS CONTINUOUS PRN
Status: DISCONTINUED | OUTPATIENT
Start: 2024-10-24 | End: 2024-10-24

## 2024-10-24 RX ORDER — PROPOFOL 10 MG/ML
INJECTION, EMULSION INTRAVENOUS PRN
Status: DISCONTINUED | OUTPATIENT
Start: 2024-10-24 | End: 2024-10-24

## 2024-10-24 RX ORDER — BUPIVACAINE HYDROCHLORIDE AND EPINEPHRINE 2.5; 5 MG/ML; UG/ML
INJECTION, SOLUTION INFILTRATION; PERINEURAL PRN
Status: DISCONTINUED | OUTPATIENT
Start: 2024-10-24 | End: 2024-10-24 | Stop reason: HOSPADM

## 2024-10-24 RX ORDER — HYDROMORPHONE HCL IN WATER/PF 6 MG/30 ML
0.4 PATIENT CONTROLLED ANALGESIA SYRINGE INTRAVENOUS EVERY 5 MIN PRN
Status: DISCONTINUED | OUTPATIENT
Start: 2024-10-24 | End: 2024-10-24 | Stop reason: HOSPADM

## 2024-10-24 RX ADMIN — EPHEDRINE SULFATE 5 MG: 5 INJECTION INTRAVENOUS at 14:21

## 2024-10-24 RX ADMIN — HYDROMORPHONE HYDROCHLORIDE 0.2 MG: 0.2 INJECTION, SOLUTION INTRAMUSCULAR; INTRAVENOUS; SUBCUTANEOUS at 17:35

## 2024-10-24 RX ADMIN — PHENYLEPHRINE HYDROCHLORIDE 0.3 MCG/KG/MIN: 10 INJECTION INTRAVENOUS at 13:47

## 2024-10-24 RX ADMIN — Medication 20 MG: at 13:47

## 2024-10-24 RX ADMIN — DEXAMETHASONE SODIUM PHOSPHATE 4 MG: 4 INJECTION, SOLUTION INTRA-ARTICULAR; INTRALESIONAL; INTRAMUSCULAR; INTRAVENOUS; SOFT TISSUE at 14:13

## 2024-10-24 RX ADMIN — GLYCOPYRROLATE 0.1 MG: 0.2 INJECTION, SOLUTION INTRAMUSCULAR; INTRAVENOUS at 14:26

## 2024-10-24 RX ADMIN — MYCOPHENOLATE MOFETIL 500 MG: 500 INJECTION, POWDER, LYOPHILIZED, FOR SOLUTION INTRAVENOUS at 15:49

## 2024-10-24 RX ADMIN — ENOXAPARIN SODIUM 40 MG: 40 INJECTION SUBCUTANEOUS at 13:04

## 2024-10-24 RX ADMIN — FENTANYL CITRATE 50 MCG: 50 INJECTION, SOLUTION INTRAMUSCULAR; INTRAVENOUS at 17:12

## 2024-10-24 RX ADMIN — EPHEDRINE SULFATE 5 MG: 5 INJECTION INTRAVENOUS at 16:33

## 2024-10-24 RX ADMIN — Medication 2 G: at 13:22

## 2024-10-24 RX ADMIN — REMIFENTANIL HYDROCHLORIDE 27 MCG: 1 INJECTION, POWDER, LYOPHILIZED, FOR SOLUTION INTRAVENOUS at 14:41

## 2024-10-24 RX ADMIN — SODIUM CHLORIDE, POTASSIUM CHLORIDE, SODIUM LACTATE AND CALCIUM CHLORIDE: 600; 310; 30; 20 INJECTION, SOLUTION INTRAVENOUS at 13:10

## 2024-10-24 RX ADMIN — HYDROMORPHONE HYDROCHLORIDE 0.2 MG: 0.2 INJECTION, SOLUTION INTRAMUSCULAR; INTRAVENOUS; SUBCUTANEOUS at 17:53

## 2024-10-24 RX ADMIN — PHENYLEPHRINE HYDROCHLORIDE 100 MCG: 10 INJECTION INTRAVENOUS at 13:47

## 2024-10-24 RX ADMIN — PHENYLEPHRINE HYDROCHLORIDE 100 MCG: 10 INJECTION INTRAVENOUS at 14:09

## 2024-10-24 RX ADMIN — FENTANYL CITRATE 100 MCG: 50 INJECTION INTRAMUSCULAR; INTRAVENOUS at 13:42

## 2024-10-24 RX ADMIN — NOREPINEPHRINE BITARTRATE 6.4 MCG: 1 INJECTION, SOLUTION, CONCENTRATE INTRAVENOUS at 14:08

## 2024-10-24 RX ADMIN — Medication 100 MG: at 16:42

## 2024-10-24 RX ADMIN — FENTANYL CITRATE 50 MCG: 50 INJECTION, SOLUTION INTRAMUSCULAR; INTRAVENOUS at 17:21

## 2024-10-24 RX ADMIN — ACETAMINOPHEN 975 MG: 325 TABLET, FILM COATED ORAL at 22:54

## 2024-10-24 RX ADMIN — REMIFENTANIL HYDROCHLORIDE 0.1 MCG/KG/MIN: 1 INJECTION, POWDER, LYOPHILIZED, FOR SOLUTION INTRAVENOUS at 13:42

## 2024-10-24 RX ADMIN — SENNOSIDES AND DOCUSATE SODIUM 1 TABLET: 8.6; 5 TABLET ORAL at 22:29

## 2024-10-24 RX ADMIN — FENTANYL CITRATE 50 MCG: 50 INJECTION INTRAMUSCULAR; INTRAVENOUS at 13:52

## 2024-10-24 RX ADMIN — PROPOFOL 80 MG: 10 INJECTION, EMULSION INTRAVENOUS at 13:42

## 2024-10-24 RX ADMIN — LIDOCAINE HYDROCHLORIDE 80 MG: 20 INJECTION, SOLUTION INFILTRATION; PERINEURAL at 13:42

## 2024-10-24 RX ADMIN — NOREPINEPHRINE BITARTRATE 6.4 MCG: 1 INJECTION, SOLUTION, CONCENTRATE INTRAVENOUS at 14:21

## 2024-10-24 RX ADMIN — REMIFENTANIL HYDROCHLORIDE 27 MCG: 1 INJECTION, POWDER, LYOPHILIZED, FOR SOLUTION INTRAVENOUS at 13:52

## 2024-10-24 RX ADMIN — FENTANYL CITRATE 50 MCG: 50 INJECTION INTRAMUSCULAR; INTRAVENOUS at 17:00

## 2024-10-24 RX ADMIN — ONDANSETRON 4 MG: 2 INJECTION INTRAMUSCULAR; INTRAVENOUS at 16:20

## 2024-10-24 ASSESSMENT — ACTIVITIES OF DAILY LIVING (ADL)
ADLS_ACUITY_SCORE: 0

## 2024-10-24 NOTE — ANESTHESIA PROCEDURE NOTES
Airway       Patient location during procedure: OR       Procedure Start/Stop Times: 10/24/2024 1:47 PM  Staff -        Resident/Fellow: Kimberly Blum       Performed By: SRNA  Consent for Airway        Urgency: elective  Indications and Patient Condition       Indications for airway management: west-procedural       Induction type:intravenous       Mask difficulty assessment: 1 - vent by mask    Final Airway Details       Final airway type: endotracheal airway       Successful airway: ETT - single and Oral  Endotracheal Airway Details        ETT size (mm): 7.0       Cuffed: yes       Cuff volume (mL): 8       Successful intubation technique: direct laryngoscopy       DL Blade Type: Garcia 2       Grade View of Cords: 1       Adjucts: stylet       Position: Right       Measured from: gums/teeth       Secured at (cm): 21       Bite block used: None    Post intubation assessment        Placement verified by: capnometry, equal breath sounds and chest rise        Number of attempts at approach: 1       Ease of procedure: easy       Dentition: Intact and Unchanged    Medication(s) Administered   Medication Administration Time: 10/24/2024 1:47 PM

## 2024-10-24 NOTE — BRIEF OP NOTE
[facility]    Brief Operative Note    Pre-operative diagnosis: Thymoma [D49.89]  Post-operative diagnosis Same as pre-operative diagnosis    Procedure: Robot-assisted bilateral thoracoscopic thymectomy, Bilateral - Abdomen    Surgeon: Surgeons and Role:     * José Miguel Nguyen MD - Primary     * Faraz León PA-C - Assisting     * Danielle Jackson PA-C - Assisting       * Juan Antonio Nagel MD - Fellow - Assisting  Anesthesia: General   Estimated Blood Loss: 10 cc    Drains: Pleural wesley  Specimens:   ID Type Source Tests Collected by Time Destination   1 : Thymus Tissue Thymus SURGICAL PATHOLOGY EXAM José Miguel Nguyen MD 10/24/2024  4:13 PM      Findings:   Thymic mass, minimal surrounding thymic tissue. Both phrenic nerves preserved.  Complications: None.  Implants: * No implants in log *    Freddie May MD  General Surgery, PGY-1  4:29 PM 10/24/24    ** For on call schedules and contact information, please refer to [facility] **

## 2024-10-24 NOTE — ANESTHESIA CARE TRANSFER NOTE
Patient: Zahida Cespedes    Procedure: Procedure(s):  Robot-Assisted Thoracoscopic Thymectomy       Diagnosis: Thymoma [D49.89]  Diagnosis Additional Information: No value filed.    Anesthesia Type:   General     Note:    Oropharynx: oropharynx clear of all foreign objects and spontaneously breathing  Level of Consciousness: awake  Oxygen Supplementation: face mask  Level of Supplemental Oxygen (L/min / FiO2): 6  Independent Airway: airway patency satisfactory and stable  Dentition: dentition unchanged  Vital Signs Stable: post-procedure vital signs reviewed and stable  Report to RN Given: handoff report given  Patient transferred to: PACU    Handoff Report: Identifed the Patient, Identified the Reponsible Provider, Reviewed the pertinent medical history, Discussed the surgical course, Reviewed Intra-OP anesthesia mangement and issues during anesthesia, Set expectations for post-procedure period and Allowed opportunity for questions and acknowledgement of understanding  Vitals:  Vitals Value Taken Time   /139 10/24/24 1700   Temp     Pulse 77 10/24/24 1704   Resp 19 10/24/24 1704   SpO2 100 % 10/24/24 1704   Vitals shown include unfiled device data.    Electronically Signed By: SANTIAGO Kenney CRNA  October 24, 2024  5:04 PM

## 2024-10-24 NOTE — ANESTHESIA POSTPROCEDURE EVALUATION
Patient: Zahida Cespedes    Procedure: Procedure(s):  Robot-Assisted Thoracoscopic Thymectomy       Anesthesia Type:  General    Note:     Postop Pain Control: Uneventful            Sign Out: Well controlled pain   PONV: No   Neuro/Psych: Uneventful            Sign Out: Acceptable/Baseline neuro status   Airway/Respiratory: Uneventful            Sign Out: Acceptable/Baseline resp. status   CV/Hemodynamics: Uneventful            Sign Out: Acceptable CV status; No obvious hypovolemia; No obvious fluid overload   Other NRE: NONE   DID A NON-ROUTINE EVENT OCCUR?            Last vitals:  Vitals Value Taken Time   /83 10/24/24 1815   Temp     Pulse 79 10/24/24 1821   Resp 20 10/24/24 1821   SpO2 96 % 10/24/24 1821   Vitals shown include unfiled device data.    Electronically Signed By: Crista Estevez MD  October 24, 2024  6:23 PM

## 2024-10-25 ENCOUNTER — APPOINTMENT (OUTPATIENT)
Dept: OCCUPATIONAL THERAPY | Facility: CLINIC | Age: 84
DRG: 804 | End: 2024-10-25
Payer: MEDICARE

## 2024-10-25 ENCOUNTER — APPOINTMENT (OUTPATIENT)
Dept: GENERAL RADIOLOGY | Facility: CLINIC | Age: 84
DRG: 804 | End: 2024-10-25
Payer: MEDICARE

## 2024-10-25 LAB
ANION GAP SERPL CALCULATED.3IONS-SCNC: 12 MMOL/L (ref 7–15)
BUN SERPL-MCNC: 16.8 MG/DL (ref 8–23)
CALCIUM SERPL-MCNC: 9.1 MG/DL (ref 8.8–10.4)
CHLORIDE SERPL-SCNC: 96 MMOL/L (ref 98–107)
CREAT SERPL-MCNC: 0.81 MG/DL (ref 0.51–0.95)
EGFRCR SERPLBLD CKD-EPI 2021: 71 ML/MIN/1.73M2
ERYTHROCYTE [DISTWIDTH] IN BLOOD BY AUTOMATED COUNT: 12.9 % (ref 10–15)
GLUCOSE SERPL-MCNC: 162 MG/DL (ref 70–99)
HCO3 SERPL-SCNC: 23 MMOL/L (ref 22–29)
HCT VFR BLD AUTO: 41.6 % (ref 35–47)
HGB BLD-MCNC: 14.3 G/DL (ref 11.7–15.7)
MAGNESIUM SERPL-MCNC: 1.6 MG/DL (ref 1.7–2.3)
MCH RBC QN AUTO: 32.6 PG (ref 26.5–33)
MCHC RBC AUTO-ENTMCNC: 34.4 G/DL (ref 31.5–36.5)
MCV RBC AUTO: 95 FL (ref 78–100)
PLATELET # BLD AUTO: 162 10E3/UL (ref 150–450)
POTASSIUM SERPL-SCNC: 4.7 MMOL/L (ref 3.4–5.3)
RBC # BLD AUTO: 4.39 10E6/UL (ref 3.8–5.2)
SODIUM SERPL-SCNC: 131 MMOL/L (ref 135–145)
TROPONIN T SERPL HS-MCNC: 25 NG/L
TROPONIN T SERPL HS-MCNC: 27 NG/L
WBC # BLD AUTO: 14.3 10E3/UL (ref 4–11)

## 2024-10-25 PROCEDURE — 97165 OT EVAL LOW COMPLEX 30 MIN: CPT | Mod: GO

## 2024-10-25 PROCEDURE — 250N000012 HC RX MED GY IP 250 OP 636 PS 637

## 2024-10-25 PROCEDURE — 80048 BASIC METABOLIC PNL TOTAL CA: CPT

## 2024-10-25 PROCEDURE — 83735 ASSAY OF MAGNESIUM: CPT

## 2024-10-25 PROCEDURE — 93005 ELECTROCARDIOGRAM TRACING: CPT

## 2024-10-25 PROCEDURE — 120N000002 HC R&B MED SURG/OB UMMC

## 2024-10-25 PROCEDURE — 84484 ASSAY OF TROPONIN QUANT: CPT

## 2024-10-25 PROCEDURE — 71045 X-RAY EXAM CHEST 1 VIEW: CPT

## 2024-10-25 PROCEDURE — 36415 COLL VENOUS BLD VENIPUNCTURE: CPT

## 2024-10-25 PROCEDURE — 258N000003 HC RX IP 258 OP 636

## 2024-10-25 PROCEDURE — 999N000147 HC STATISTIC PT IP EVAL DEFER

## 2024-10-25 PROCEDURE — 97535 SELF CARE MNGMENT TRAINING: CPT | Mod: GO

## 2024-10-25 PROCEDURE — 97530 THERAPEUTIC ACTIVITIES: CPT | Mod: GO

## 2024-10-25 PROCEDURE — 85014 HEMATOCRIT: CPT

## 2024-10-25 PROCEDURE — 250N000013 HC RX MED GY IP 250 OP 250 PS 637: Performed by: SURGERY

## 2024-10-25 PROCEDURE — 250N000011 HC RX IP 250 OP 636

## 2024-10-25 PROCEDURE — 71045 X-RAY EXAM CHEST 1 VIEW: CPT | Mod: 26 | Performed by: RADIOLOGY

## 2024-10-25 PROCEDURE — 250N000013 HC RX MED GY IP 250 OP 250 PS 637

## 2024-10-25 RX ORDER — AMOXICILLIN 250 MG
1 CAPSULE ORAL 2 TIMES DAILY
Qty: 30 TABLET | Refills: 0 | Status: SHIPPED | OUTPATIENT
Start: 2024-10-25

## 2024-10-25 RX ORDER — ACETAMINOPHEN AND CODEINE PHOSPHATE 300; 30 MG/1; MG/1
1 TABLET ORAL EVERY 4 HOURS PRN
Status: DISCONTINUED | OUTPATIENT
Start: 2024-10-25 | End: 2024-10-26

## 2024-10-25 RX ORDER — ATORVASTATIN CALCIUM 20 MG/1
20 TABLET, FILM COATED ORAL EVERY MORNING
Status: DISCONTINUED | OUTPATIENT
Start: 2024-10-25 | End: 2024-10-26 | Stop reason: HOSPADM

## 2024-10-25 RX ORDER — MYCOPHENOLATE MOFETIL 500 MG/1
500 TABLET ORAL EVERY MORNING
Status: DISCONTINUED | OUTPATIENT
Start: 2024-10-25 | End: 2024-10-26 | Stop reason: HOSPADM

## 2024-10-25 RX ORDER — OXYCODONE HYDROCHLORIDE 5 MG/1
2.5 TABLET ORAL EVERY 4 HOURS PRN
Qty: 20 TABLET | Refills: 0 | Status: SHIPPED | OUTPATIENT
Start: 2024-10-25

## 2024-10-25 RX ORDER — ACETAMINOPHEN 325 MG/1
975 TABLET ORAL EVERY 6 HOURS PRN
COMMUNITY
Start: 2024-10-25

## 2024-10-25 RX ORDER — MAGNESIUM SULFATE HEPTAHYDRATE 40 MG/ML
2 INJECTION, SOLUTION INTRAVENOUS ONCE
Status: DISCONTINUED | OUTPATIENT
Start: 2024-10-25 | End: 2024-10-26 | Stop reason: HOSPADM

## 2024-10-25 RX ORDER — FINASTERIDE 5 MG/1
5 TABLET, FILM COATED ORAL EVERY MORNING
Status: DISCONTINUED | OUTPATIENT
Start: 2024-10-25 | End: 2024-10-26 | Stop reason: HOSPADM

## 2024-10-25 RX ADMIN — SODIUM CHLORIDE, POTASSIUM CHLORIDE, SODIUM LACTATE AND CALCIUM CHLORIDE 250 ML: 600; 310; 30; 20 INJECTION, SOLUTION INTRAVENOUS at 18:16

## 2024-10-25 RX ADMIN — ENOXAPARIN SODIUM 40 MG: 40 INJECTION SUBCUTANEOUS at 15:28

## 2024-10-25 RX ADMIN — SENNOSIDES AND DOCUSATE SODIUM 1 TABLET: 8.6; 5 TABLET ORAL at 19:52

## 2024-10-25 RX ADMIN — FINASTERIDE 5 MG: 5 TABLET, FILM COATED ORAL at 09:07

## 2024-10-25 RX ADMIN — SENNOSIDES AND DOCUSATE SODIUM 1 TABLET: 8.6; 5 TABLET ORAL at 09:07

## 2024-10-25 RX ADMIN — ATORVASTATIN CALCIUM 20 MG: 20 TABLET, FILM COATED ORAL at 09:07

## 2024-10-25 RX ADMIN — OXYCODONE HYDROCHLORIDE 2.5 MG: 5 TABLET ORAL at 15:51

## 2024-10-25 RX ADMIN — MYCOPHENOLATE MOFETIL 500 MG: 500 TABLET, FILM COATED ORAL at 09:08

## 2024-10-25 RX ADMIN — OXYCODONE HYDROCHLORIDE 2.5 MG: 5 TABLET ORAL at 19:52

## 2024-10-25 RX ADMIN — OXYCODONE HYDROCHLORIDE 2.5 MG: 5 TABLET ORAL at 11:17

## 2024-10-25 RX ADMIN — ACETAMINOPHEN AND CODEINE PHOSPHATE 1 TABLET: 300; 30 TABLET ORAL at 09:45

## 2024-10-25 ASSESSMENT — ACTIVITIES OF DAILY LIVING (ADL)
WALKING_OR_CLIMBING_STAIRS_DIFFICULTY: NO
DIFFICULTY_COMMUNICATING: NO
ADLS_ACUITY_SCORE: 0
VISION_MANAGEMENT: CONTACT LENSES
ADLS_ACUITY_SCORE: 0
CONCENTRATING,_REMEMBERING_OR_MAKING_DECISIONS_DIFFICULTY: NO
ADLS_ACUITY_SCORE: 0
DIFFICULTY_EATING/SWALLOWING: NO
ADLS_ACUITY_SCORE: 0
FALL_HISTORY_WITHIN_LAST_SIX_MONTHS: NO
ADLS_ACUITY_SCORE: 0
ADLS_ACUITY_SCORE: 0
HEARING_DIFFICULTY_OR_DEAF: NO
ADLS_ACUITY_SCORE: 0
DOING_ERRANDS_INDEPENDENTLY_DIFFICULTY: NO
TOILETING_ISSUES: NO
ADLS_ACUITY_SCORE: 0
WEAR_GLASSES_OR_BLIND: YES
CHANGE_IN_FUNCTIONAL_STATUS_SINCE_ONSET_OF_CURRENT_ILLNESS/INJURY: NO
ADLS_ACUITY_SCORE: 0
DRESSING/BATHING_DIFFICULTY: NO
ADLS_ACUITY_SCORE: 0

## 2024-10-25 NOTE — DISCHARGE SUMMARY
NAME: Zahida Cespedes   MRN: [id number]   : 1940     DATE OF ADMISSION: 10/24/2024     PRE/POSTOPERATIVE DIAGNOSES: Thymoma    PROCEDURES PERFORMED: Robot-assisted subxiphoid thoracoscopic thymectomy     PATHOLOGY RESULTS: Pending at time of discharge     CULTURE RESULTS: N/A    INTRAOPERATIVE COMPLICATIONS: None     POSTOPERATIVE MEDICAL ISSUES: None     DRAINS/TUBES PRESENT AT DISCHARGE: Closed incisions with dissolvable sutures and steri strips dressings. No tubes or drains.     DATE OF DISCHARGE:  10/26/2024    HOSPITAL COURSE: Zahida Cespedes is a 84 year old female who on 10/24/2024 underwent the above-named procedures. She tolerated the operation well and postoperatively was transferred to the general post-surgical unit.  The remainder of her course was essentially uncomplicated. Her chest tube was removed on 10/26/2024. Prior to discharge, her pain was controlled well, she was able to perform ADLs and ambulate independently without difficulty, and had full return of bowel and bladder function. On 10/26/2024, she was discharged to home in stable condition. She will follow-up in the Thoracic Surgery clinic on 2024.    DISCHARGE EXAM:   Aox4  NLB on RA, CT site with dressing in place  RRR on radial palpation  Neurologically intact upon gross examination  Abdomen flat and non distended  Skin warm and appearing well perfused  Incision sites are dry and clean      DISCHARGE INSTRUCTIONS:  Discharge Procedure Orders   Reason for your hospital stay   Order Comments: Surgery     Activity   Order Comments: As in discharge instruction section     Order Specific Question Answer Comments   Is discharge order? Yes      Adult [facility] Follow-up and recommended labs and tests   Order Comments: 1.) Follow up with primary care physician, Betty Neff, in 1-2 weeks.  2.) Thoracic surgery follow up;    1 month with CXR with Leonela Caldwell CNS    Appointments on [place] and/or [facility] (with [facility]  or [facility] provider or service). Call [phone] if you haven't heard regarding these appointments within 7 days of discharge.     Discharge Instructions   Order Comments: THORACIC SURGERY DISCHARGE INSTRUCTIONS    DIET: Regular diet - as prior to admission     If your plans upon discharge include prolonged periods of sitting (i.e a lengthy car or plane ride), it is highly beneficial to get up and walk at least once per hour to help prevent swelling and blood clots.     You may remove chest tube dressing 48 hours after tube removal and bandage the site at your own discretion thereafter.  Small amounts of leakage are normal for 2-3 days after removal.  Feel free to call with questions.    You may get incision wet 2 days after operation. Do not submerge, soak, or scrub incision or swim until seen in follow-up.    Take incentive spirometer home for continued frequent use    Activity as tolerated, no strenous activity until seen in follow-up, no lifting greater than 20 pounds for the next 1-2 weeks.    Stay hydrated. Take over the counter fiber (metamucil or benefiber) and stool softeners (Miralax, docusate or senna) if becoming constipated.     Call for fever greater than 101.5, chills, increased size of incision, red skin around incision, vision changes, muscle strength changes, sensation changes, shortness of breath, or other concerns.    No driving while taking narcotic pain medication.    Transition to ibuprofen or tylenol/acetaminophen for pain control. Do not take tylenol/acetaminophen and acetaminophen containing narcotic (e.g., percocet or vicodin) at the same time. If you have known ulcer problems, or kidney trouble (elevated creatinine) do not take the ibuprofen.    If you think you will need a refill on your pain medications, you should call the thoracic surgery team at the number below at least 24hrs prior to running out.  It is also more difficult to provide refills Friday afternoon and over the weekend, so  "call before those times if possible.     In emergencies, call [phone]    For other Questions or Concerns;   A.) During weekday working hours (Monday through Friday 8am to 4:30pm)   call [phone] and ask to speak to a thoracic surgery nurse (RN or LPN).     B.) At nights (after 4:30pm), on weekends, or if urgent call [phone] and   tell the  \"I would like to page job code [phone], the thoracic surgery   fellow on call, please.\"     Diet   Order Comments: As in discharge instruction section     Order Specific Question Answer Comments   Is discharge order? Yes        DISCHARGE MEDICATIONS:   Discharge Medication List as of 10/26/2024  4:10 PM        START taking these medications    Details   acetaminophen (TYLENOL) 325 MG tablet Take 3 tablets (975 mg) by mouth every 6 hours as needed for mild pain., OTC      ibuprofen (ADVIL/MOTRIN) 200 MG tablet Take 1 tablet (200 mg) by mouth every 6 hours., Disp-50 tablet, R-0, E-Prescribe      oxyCODONE (ROXICODONE) 5 MG tablet Take 0.5 tablets (2.5 mg) by mouth every 4 hours as needed for moderate pain., Disp-20 tablet, R-0, E-Prescribe      senna-docusate (SENOKOT-S/PERICOLACE) 8.6-50 MG tablet Take 1 tablet by mouth 2 times daily. Reduce dose or temporarily discontinue if having loose stools., Disp-30 tablet, R-0, E-Prescribe           CONTINUE these medications which have NOT CHANGED    Details   atorvastatin (LIPITOR) 20 MG tablet Take 20 mg by mouth every morning., Historical      B Complex Vitamins (VITAMIN B COMPLEX PO) Take 1 capsule by mouth every morning., Historical      calcium carbonate (OS-MARY) 500 MG tablet Take 2 tablets by mouth every morning., Historical      Cholecalciferol (VITAMIN D3) 2000 UNITS CAPS Take 2,000 Units by mouth every morning., Historical      clobetasol (TEMOVATE) 0.05 % external ointment Apply topically Every Mon, Wed, Fri Morning.Historical      Echinacea 650 MG CAPS Take 1 mg by mouth every morning., Historical    "   estradiol (ESTRACE) 0.1 MG/GM vaginal cream Place vaginally Every Mon, Wed, Fri Morning. At nightHistorical      finasteride (PROSCAR) 5 MG tablet Take 5 mg by mouth every morning., Historical      ipratropium (ATROVENT) 0.03 % nasal spray Spray 2 sprays in nostril 3 times daily as needed, Historical      Lutein 6 MG CAPS Take 1 capsule by mouth every morning., Historical      Magnesium 400 MG CAPS Take 2 capsules by mouth every morning., Historical      Multiple Vitamin (MULTI-VITAMINS) TABS Take 1 tablet by mouth every morning., Historical      mycophenolate (GENERIC EQUIVALENT) 500 MG tablet Take 1 tablet (500 mg) by mouth daily, Disp-90 tablet, R-3, E-Prescribe      Omega-3 Fatty Acids (FISH OIL CONCENTRATE) 300 MG CAPS Take 1,000 mg by mouth every morning., Historical

## 2024-10-25 NOTE — PROGRESS NOTES
Goal Outcome Evaluation: Reviewed with patient.     Reason for Admission: Thymoma.     Status: Improving.     RN assumed cares at 0700.    Vitals: Stable, X; low bp.   Pain: 6/10, managed with 2.5mg Oxy x2.   Neuro: WDL.   Cardiac: WDL, X; ST Elevation, Elevated Troponin.   Respiratory: WDL.   GI/: WDL, X; Voids ok. No BM this shift.   IV/Drains: PIV SL. Chest Tube.   Skin: WDL, X; Shin Bruising, Surgical Incision.   Activity: SBA x1.   Labs: WDL, X; Troponin 27. Mg 1.6.   Diet: Regular.   Procedures: Stat EKG.   Tele: Yes.     Continue with POC.

## 2024-10-25 NOTE — PHARMACY-ADMISSION MEDICATION HISTORY
Pharmacist Admission Medication History    Admission medication history is complete. The information provided in this note is only as accurate as the sources available at the time of the update.    Information Source(s): Patient and CareEverywhere/SureScripts via in-person    Pertinent Information: None    Changes made to PTA medication list:  Added: None  Deleted: None  Changed: None    Allergies reviewed with patient and updates made in EHR: no    Medication History Completed By: Ed Schmitt RPH 10/25/2024 4:00 PM    PTA Med List   Medication Sig Last Dose/Taking    acetaminophen (TYLENOL) 325 MG tablet Take 3 tablets (975 mg) by mouth every 6 hours as needed for mild pain. Taking As Needed    atorvastatin (LIPITOR) 20 MG tablet Take 20 mg by mouth every morning. 10/23/2024 Morning    Cholecalciferol (VITAMIN D3) 2000 UNITS CAPS Take 2,000 Units by mouth every morning. 10/23/2024 Morning    clobetasol (TEMOVATE) 0.05 % external ointment Apply topically Every Mon, Wed, Fri Morning. 10/23/2024    estradiol (ESTRACE) 0.1 MG/GM vaginal cream Place vaginally Every Mon, Wed, Fri Morning. At night 10/23/2024    finasteride (PROSCAR) 5 MG tablet Take 5 mg by mouth every morning. 10/23/2024 Morning    Magnesium 400 MG CAPS Take 2 capsules by mouth every morning. 10/23/2024 Morning    mycophenolate (GENERIC EQUIVALENT) 500 MG tablet Take 1 tablet (500 mg) by mouth daily (Patient taking differently: Take 500 mg by mouth every morning.) 10/23/2024 Morning    oxyCODONE (ROXICODONE) 5 MG tablet Take 0.5 tablets (2.5 mg) by mouth every 4 hours as needed for moderate pain. Taking As Needed    senna-docusate (SENOKOT-S/PERICOLACE) 8.6-50 MG tablet Take 1 tablet by mouth 2 times daily. Reduce dose or temporarily discontinue if having loose stools. Taking

## 2024-10-25 NOTE — PROGRESS NOTES
"  Thoracic Surgery Progress Note  Surgery Cross-Cover  Post Op Check    10/25/2024    Zahida Cespedes is a 84 year old female POD#0 s/p Procedure(s):  Robot-Assisted Thoracoscopic Thymectomy for Pre-Op Diagnosis Codes:      * Thymoma [D49.89]    Pt reports their pain is controlled with current regimen. Denies nausea, SOB, chest pain, or dizziness. Patient is not passing flatus or having bowel movements and Is voiding spontaneously.     /73 (BP Location: Right arm)   Pulse 75   Temp 98.3  F (36.8  C) (Oral)   Resp 18   Ht 1.651 m (5' 5\")   Wt 54.2 kg (119 lb 7.8 oz)   SpO2 97%   BMI 19.88 kg/m      Gen: A&O x4, NAD   Chest: breathing non-labored on room air, chest tube with serosanguinous output   Abdomen: soft, non-tender, non-distended  Incision: clean, dry, intact covered by dressings  Extremities: warm and well perfused    A/P: No acute post-op issues. Continue plan of care per primary team. Please call with any questions.  CXR -  Postsurgical changes of the chest with subcutaneous  emphysema of the chest wall. Chest tube in place. No appreciable  pneumothorax. No focal pulmonary opacities.    Suze Kerr MD  General Surgery PGY-1    **For on call schedules and contact information, please refer to [facility]**      "

## 2024-10-25 NOTE — PROGRESS NOTES
[facility]    Progress Note - Thoracic Surgery Service       Date of Admission:  10/24/2024    Assessment & Plan: Surgery   Ms. Cespedes is a 84 year old woman with a significant PMH of well controlled ocular myasthenia gravis who underwent a robotic assisted thymectomy through a subxiphoid approach on 10/25. Her surgery was uncomplicated and she is recovering well on POD#1. Her chest tube has drained approximately 350 of sanguinous fluid since surgery.     - Chest tube to WS, will consider pulling depending on continued drainage throughout  the day  - Daily CXR  - MMPC including tylenol, dilaudid, oxycodone available  - PTA atorvastatin  - PTA finasteride  - Diet regular  - Lovenox for DVT prophylaxis  - PTA MMF  - PT/OT  - Bowel regimen  - Daily CBC BMP Mg Phos    PPx: lovenox, SCDs  Dispo: home likely tomorrow vs     The patient's care was discussed with the Attending Physician, Dr. Nguyen .    Serafin Priest, MS3  _____________________________________________________________________    Interval History   Ms. Cespedes had a comfortable night and required only tylenol for adequate pain control. She is ambulating to the bathroom. She is yet to pass gas or have a bowel movement but is urinating. She looks forward to breakfast.      Vital Signs:  Temp: 98.3  F (36.8  C) Temp src: Oral BP: 119/73 Pulse: 75   Resp: 18 SpO2: 97 % O2 Device: None (Room air) Oxygen Delivery: 2 LPM    Exam  AOx4  Neurologically intact upon gross examination  Abdomen flat and non distended  Skin warm and appearing well perfused  Incision sites are dry and clean    LABS (10/25/24 AM)    Sodium: 131 (Low)  Potassium: 4.7  Chloride: 96 (Low)  CO2: 23  BUN: 23  Creatinine: 0.81  Ma.6 (Low)  Glucose: 162 (High)    WBC:14.3 (High)  Hgb: 14.3  Platelets: 162    CXR (10/24/24 PM)  No pneumothorax or focal abnormalities. Subcutaneous emphysema of right chest wall  present.    Resident/Fellow Attestation   I, Freddie May MD, was present with the medical/[employer] student who participated in the service and in the documentation of the note.  I have verified the history and personally performed the physical exam and medical decision making.  I agree with the assessment and plan of care as documented in the note.      Patient doing well this morning. ~400 ml serosang out from tube, will likely keep today. Today, out of bed, pain control, PT/OT. Dispo probably tomorrow.    Freddie May MD  PGY1  Date of Service (when I saw the patient): 10/25/24

## 2024-10-25 NOTE — PROGRESS NOTES
10/25/24 1427   Appointment Info   Signing Clinician's Name / Credentials (OT) Suzanne Starkey OTR/L   Rehab Comments (OT) no lifting>20 lbs; no strenuous activity   Living Environment   People in Home grandchild(gentry)  (19yo grandson who works full-time)   Current Living Arrangements house   Home Accessibility stairs within home   Number of Stairs, Within Home, Primary greater than 10 stairs  (full flight down to basement w/ rails on both sides)   Stair Railings, Within Home, Primary railings on both sides of stairs   Transportation Anticipated family or friend will provide   Living Environment Comments Pt is IND in ADLs/iADLs ( driving, med mgmt). Her son will be staying at least 1 week (can stay longer if needed) to provide assist prn. She is very active and enjoys gardening. HOME: ramp to enter from garage, only exercises / goes in basement for freezer otherwise no stairs. Bathroom: walk-in shower w/ grab bars and HHSH.   Self-Care   Usual Activity Tolerance excellent   Current Activity Tolerance good   Regular Exercise Yes   Activity/Exercise Type walking  (elliptical)   Exercise Amount/Frequency daily  (elliptical AM, walking laps in basement in PM)   Equipment Currently Used at Home grab bar, tub/shower   Fall history within last six months no   Activity/Exercise/Self-Care Comment Hx of pelvic floor PT   General Information   Onset of Illness/Injury or Date of Surgery 10/24/24   Referring Physician Freddie May MD   Patient/Family Therapy Goal Statement (OT) Return home   Additional Occupational Profile Info/Pertinent History of Current Problem Ms. Cespedes is a 84 year old woman with a significant PMH of well controlled ocular myasthenia gravis who underwent a robotic assisted thymectomy through a subxiphoid approach on 10/25.   Existing Precautions/Restrictions fall;cardiac;other (see comments)  (no lifting > 20 lbs x 2 weeks)   General Observations and Info Activity Ambulate with assist   Cognitive  Status Examination   Cognitive Status Comments WFL   Visual Perception   Visual Impairment/Limitations WNL   Sensory   Sensory Quick Adds sensation intact   Pain Assessment   Patient Currently in Pain Yes, see Vital Sign flowsheet   Posture   Posture scoliosis   Range of Motion Comprehensive   Comment, General Range of Motion WFL   Strength Comprehensive (MMT)   Comment, General Manual Muscle Testing (MMT) Assessment No formal MMT; WFL for basic self-cares/mobility   Bed Mobility   Bed Mobility supine-sit;sit-supine   Comment (Bed Mobility) SBA   Transfers   Transfers sit-stand transfer;toilet transfer   Transfer Comments SBA   Activities of Daily Living   BADL Assessment/Intervention bathing;lower body dressing;toileting   Bathing Assessment/Intervention   [place] Level (Bathing) contact guard assist   Comment, (Bathing) per clinical judgement   Lower Body Dressing Assessment/Training   Comment, (Lower Body Dressing) per clinical judgement   [place] Level (Lower Body Dressing) contact guard assist   Toileting   Comment, (Toileting) per clinical judgement   [place] Level (Toileting) contact guard assist   Clinical Impression   Criteria for Skilled Therapeutic Interventions Met (OT) Yes, treatment indicated   OT Diagnosis impaired ADLs, impaired functional mobility   OT Problem List-Impairments impacting ADL problems related to;activity tolerance impaired;balance;strength;pain;post-surgical precautions   Assessment of Occupational Performance 3-5 Performance Deficits   Identified Performance Deficits bathing, dressing, toileting, functional mobility, home mgmt   Planned Therapy Interventions (OT) ADL retraining;IADL retraining;bed mobility training;transfer training;home program guidelines;progressive activity/exercise   Clinical Decision Making Complexity (OT) problem focused assessment/low complexity   Risk & Benefits of therapy have been explained evaluation/treatment results reviewed;care  plan/treatment goals reviewed;risks/benefits reviewed;current/potential barriers reviewed;participants voiced agreement with care plan;participants included;patient;son   Clinical Impression Comments Pt remains below her baseline functional status and would benefit from ongoing skilled OT while she remains IP to progress IND/safety in daily routine.   OT Total Evaluation Time   OT Nisreen, Low Complexity Minutes (53286) 7   OT Goals   Therapy Frequency (OT) Daily   OT Predicted Duration/Target Date for Goal Attainment 11/24/24   OT Goals Lower Body Dressing;Bed Mobility;Toilet Transfer/Toileting;OT Goal 1   OT: Lower Body Dressing Modified independent;within precautions   OT: Bed Mobility Modified independent;within precautions   OT: Toilet Transfer/Toileting Modified independent;within precautions   OT: Goal 1 Pt will navigate 12 steps w/ bilateral rails with SUP in order to navigate home environment.   OT Discharge Planning   OT Plan bed mob flat bed; full body dressing; progress functional mobility; practice stairs if pt wants (does not need for safe d/c),   OT Discharge Recommendation (DC Rec) home with assist;other (see comments)  (return to previous OP PT)   OT Rationale for DC Rec Pt remains primarily limited by acute post-op pain and precautions. She is completing bADLs + functional mobility w/ largely SBA. Rec d/c home with assist from family (son, grandson) when medically ready. Would benefit from resuming previous OP PT for overall strengthening/balance.   OT Brief overview of current status SBA x 250'   Total Session Time   Total Session Time (sum of timed and untimed services) 7

## 2024-10-25 NOTE — PLAN OF CARE
"Goal Outcome Evaluation:    Plan of Care Reviewed With: patient    Admitted/transferred from:   2 RN full   skin assessment completed by Deandre Radford RN and ***.  Skin assessment finding: issues found - 3 lower left port sites and 1 Chest tube site. CT site dressing reinforced x1. Left shin wound with dressing, CDI.     Interventions/actions: skin interventions - Early ambulating encouraged. Education on maintaining skin integrity provided. Will continue to monitor for any changes.    Vitals: Blood pressure 119/73, pulse 75, temperature 98.3  F (36.8  C), temperature source Oral, resp. rate 18, height 1.651 m (5' 5\"), weight 54.2 kg (119 lb 7.8 oz), SpO2 97%, not currently breastfeeding.    Pt is A/O x 4, calls appropriately and makes needs known. Up SBA, O2 > 94% on RA. On cardiac monitor, sinus rhythm. Incisional pain managed with tylenol. Denies nausea at this time. Tolerating clear liquid diet. No BM this shift, voiding spontaneously, AUOP. No other significant changes this shift, continue POC.              "

## 2024-10-25 NOTE — PLAN OF CARE
Physical Therapy: Orders received. Chart reviewed and discussed with care team.? Physical Therapy not indicated due to no skilled physical therapy needs identified. Per chart review and discussion with OT, patient is currently ambulating with SBA. Anticipate OT to meet patient's mobility needs during admission.? Defer discharge recommendations to medical team.? Will complete orders.      Awilda Ro, PT, DPT

## 2024-10-25 NOTE — PLAN OF CARE
Goal Outcome Evaluation:      Plan of Care Reviewed With: patient    Overall Patient Progress: no changeOverall Patient Progress: no change       Neuro: A&Ox4, makes needs known  Cardiac: Denies chest pain, hypotensive - provider notified, 250 ml LR bolus given. BP improved to 108/66. On tele - ST elevation noted, provider aware  Resp: Denies SOB, on RA  GI: Not passing gas, no BM  : AUOP  Pain: 5/10, managed w/ oxy x2  N/V: Minimal, no intervention needed  Skin: Scattered bruising, surgical incison  LDA: R PIV - SL. R CT - water seal  Labs: Reviewed  Diet: Regular  Activity: SBA w/walker & GB, d/t unsteadiness on feet  VS: Stable, afebrile     Plan: Continue with POC.

## 2024-10-25 NOTE — PROGRESS NOTES
Brief Surgery Note    Paged that EKG is reading STEMI. At the bedside, patient denies chest pain, lightheadedness, jaw pain, shoulder pain, dyspnea, dizziness. She has some discomfort around the Rufus site.    Temp:  [97.5  F (36.4  C)-98.6  F (37  C)] 98  F (36.7  C)  Pulse:  [65-85] 72  Resp:  [13-21] 18  BP: (111-199)/() 111/58  MAP:  [45 mmHg-160 mmHg] 131 mmHg  Arterial Line BP: (160-196)/() 186/90  SpO2:  [90 %-100 %] 98 %   I/O last 3 completed shifts:  In: 717.24 [I.V.:607.24; IV Piggyback:110]  Out: 140 [Blood:10; Chest Tube:130]    Awake and alert, no acute distress  Normal work of breathing on room air, chest wall rise equal and symmetric, Rufus on waterseal, serosanguinous drainage in tube, no air leak  Regular rate and rhythm to peripheral palpation, warm and well perfused, no significant peripheral edema  Oriented x4, no focal neurologic deficit, moves all four extremities    Recent diagnostic studies:  EKG with concave ST elevation of the inferior leads, some large T waves laterally likely reflective of repolarization changes    Plan:  Patient with ST elevation on EKG. In the setting of no symptoms and her recent surgery in which her thymus tissue was removed from the anterior surface of her heart, there is expected inflammation in the area that is likely the cause of her ST changes. Further she has a Rufus drain in place laying across her heart which could be contributing to her electrocardiographic changes. No concern for acute MI.  - trop x2  - please notify with worsening chest pain, vital sign changes    Freddie May MD  General Surgery, PGY-1  8:33 AM 10/25/24    ** For on call schedules and contact information, please refer to [facility] **

## 2024-10-26 ENCOUNTER — APPOINTMENT (OUTPATIENT)
Dept: GENERAL RADIOLOGY | Facility: CLINIC | Age: 84
DRG: 804 | End: 2024-10-26
Attending: STUDENT IN AN ORGANIZED HEALTH CARE EDUCATION/TRAINING PROGRAM
Payer: MEDICARE

## 2024-10-26 ENCOUNTER — APPOINTMENT (OUTPATIENT)
Dept: OCCUPATIONAL THERAPY | Facility: CLINIC | Age: 84
DRG: 804 | End: 2024-10-26
Attending: THORACIC SURGERY (CARDIOTHORACIC VASCULAR SURGERY)
Payer: MEDICARE

## 2024-10-26 VITALS
RESPIRATION RATE: 16 BRPM | HEART RATE: 91 BPM | WEIGHT: 119.49 LBS | DIASTOLIC BLOOD PRESSURE: 64 MMHG | SYSTOLIC BLOOD PRESSURE: 104 MMHG | HEIGHT: 65 IN | OXYGEN SATURATION: 96 % | TEMPERATURE: 98.6 F | BODY MASS INDEX: 19.91 KG/M2

## 2024-10-26 LAB
ANION GAP SERPL CALCULATED.3IONS-SCNC: 10 MMOL/L (ref 7–15)
BUN SERPL-MCNC: 21.9 MG/DL (ref 8–23)
CALCIUM SERPL-MCNC: 8.7 MG/DL (ref 8.8–10.4)
CHLORIDE SERPL-SCNC: 92 MMOL/L (ref 98–107)
CREAT SERPL-MCNC: 0.96 MG/DL (ref 0.51–0.95)
EGFRCR SERPLBLD CKD-EPI 2021: 58 ML/MIN/1.73M2
ERYTHROCYTE [DISTWIDTH] IN BLOOD BY AUTOMATED COUNT: 13.2 % (ref 10–15)
GLUCOSE SERPL-MCNC: 124 MG/DL (ref 70–99)
HCO3 SERPL-SCNC: 23 MMOL/L (ref 22–29)
HCT VFR BLD AUTO: 41.2 % (ref 35–47)
HGB BLD-MCNC: 13.7 G/DL (ref 11.7–15.7)
MAGNESIUM SERPL-MCNC: 1.8 MG/DL (ref 1.7–2.3)
MCH RBC QN AUTO: 32.3 PG (ref 26.5–33)
MCHC RBC AUTO-ENTMCNC: 33.3 G/DL (ref 31.5–36.5)
MCV RBC AUTO: 97 FL (ref 78–100)
PHOSPHATE SERPL-MCNC: 3.2 MG/DL (ref 2.5–4.5)
PLAT MORPH BLD: ABNORMAL
PLATELET # BLD AUTO: 132 10E3/UL (ref 150–450)
POLYCHROMASIA BLD QL SMEAR: SLIGHT
POTASSIUM SERPL-SCNC: 5.4 MMOL/L (ref 3.4–5.3)
RBC # BLD AUTO: 4.24 10E6/UL (ref 3.8–5.2)
RBC MORPH BLD: ABNORMAL
SODIUM SERPL-SCNC: 125 MMOL/L (ref 135–145)
WBC # BLD AUTO: 10.9 10E3/UL (ref 4–11)

## 2024-10-26 PROCEDURE — 85014 HEMATOCRIT: CPT

## 2024-10-26 PROCEDURE — 83735 ASSAY OF MAGNESIUM: CPT

## 2024-10-26 PROCEDURE — 97530 THERAPEUTIC ACTIVITIES: CPT | Mod: GO

## 2024-10-26 PROCEDURE — 84100 ASSAY OF PHOSPHORUS: CPT

## 2024-10-26 PROCEDURE — 250N000012 HC RX MED GY IP 250 OP 636 PS 637

## 2024-10-26 PROCEDURE — 36415 COLL VENOUS BLD VENIPUNCTURE: CPT

## 2024-10-26 PROCEDURE — 250N000013 HC RX MED GY IP 250 OP 250 PS 637

## 2024-10-26 PROCEDURE — 71045 X-RAY EXAM CHEST 1 VIEW: CPT | Mod: 26 | Performed by: STUDENT IN AN ORGANIZED HEALTH CARE EDUCATION/TRAINING PROGRAM

## 2024-10-26 PROCEDURE — 71045 X-RAY EXAM CHEST 1 VIEW: CPT

## 2024-10-26 PROCEDURE — 97535 SELF CARE MNGMENT TRAINING: CPT | Mod: GO

## 2024-10-26 PROCEDURE — 80048 BASIC METABOLIC PNL TOTAL CA: CPT

## 2024-10-26 RX ORDER — IBUPROFEN 200 MG
200 TABLET ORAL EVERY 6 HOURS
Qty: 50 TABLET | Refills: 0 | Status: SHIPPED | OUTPATIENT
Start: 2024-10-26

## 2024-10-26 RX ORDER — IBUPROFEN 200 MG
200 TABLET ORAL EVERY 6 HOURS
Status: DISCONTINUED | OUTPATIENT
Start: 2024-10-26 | End: 2024-10-26 | Stop reason: HOSPADM

## 2024-10-26 RX ADMIN — MYCOPHENOLATE MOFETIL 500 MG: 500 TABLET, FILM COATED ORAL at 09:40

## 2024-10-26 RX ADMIN — OXYCODONE HYDROCHLORIDE 2.5 MG: 5 TABLET ORAL at 16:27

## 2024-10-26 RX ADMIN — OXYCODONE HYDROCHLORIDE 2.5 MG: 5 TABLET ORAL at 00:09

## 2024-10-26 RX ADMIN — SENNOSIDES AND DOCUSATE SODIUM 1 TABLET: 8.6; 5 TABLET ORAL at 08:05

## 2024-10-26 RX ADMIN — IBUPROFEN 200 MG: 200 TABLET, FILM COATED ORAL at 11:12

## 2024-10-26 RX ADMIN — OXYCODONE HYDROCHLORIDE 2.5 MG: 5 TABLET ORAL at 04:43

## 2024-10-26 RX ADMIN — OXYCODONE HYDROCHLORIDE 2.5 MG: 5 TABLET ORAL at 12:24

## 2024-10-26 RX ADMIN — OXYCODONE HYDROCHLORIDE 2.5 MG: 5 TABLET ORAL at 08:14

## 2024-10-26 RX ADMIN — FINASTERIDE 5 MG: 5 TABLET, FILM COATED ORAL at 08:06

## 2024-10-26 RX ADMIN — ATORVASTATIN CALCIUM 20 MG: 20 TABLET, FILM COATED ORAL at 08:06

## 2024-10-26 ASSESSMENT — ACTIVITIES OF DAILY LIVING (ADL)
ADLS_ACUITY_SCORE: 0

## 2024-10-26 NOTE — OP NOTE
Preoperative diagnosis:                         Myasthenia gravis, thymoma    Postoperative diagnosis:                       as above     Procedure:     Robot-assisted subxiphoid thoracoscopic thymectomy     Anesthesia: General     Surgeon: José Miguel Nguyen     Assistant surgeon: Juan Antonio Nagel MD; Faraz León PA-C; Danielle Jackson PA-C    EBL:  10 mL     Complications: none immediate    Indications:  This is an 84 year old woman with myasthenia gravis and a thymoma       Description of procedure  The patient was brought to the room and placed supine upon the table.  After confirming the patient's identity and verifying the consent, appropriate monitoring devices were placed, as well as SCD boots. General anesthesia was administered.  He was intubated with a single lumen endotracheal tube.   Intravenous antibiotic was administered within 1 hour prior to the incision.  A roll was placed under the back and the arms were secured. A foot board was placed. The chest was prepped with chlorhexidine and  draped in the standard surgical fashion.      A transverse subxiphoid incision was made and blunt dissection used to enter both pleural cavities.  Eight mm ports were placed a hand's breadth laterally in the right pleural spaces. A 12 mm port was placed in the initial incision site. Insufflation was administered at 10 mm Hg. The robot was docked without difficulty. The right phrenic nerve was easily identified.  The fat pad near the diaphragm was taken down and the dissection was continued cephalad, taking care to stay away from the phrenic nerve, which was quite far.  The dissection was carried cephalad and towards the right.  The left pleural cavity was entered without difficulty.  The mammary vessels were easily identified and spared from dissection. The left phrenic nerve was identified and the dissection along the left side was carried out a safe distance away from the phrenic nerve.  As the bilateral  dissection continued cephalad, we easily identified the innominate vein and identified branches going to the thymus, which were divided with the synchroseal device.  The dissection then continued cephalad and superficial to the innominate vein, identifying the bilateral horns and dissecting these free without difficulty.  The Endo Catch bag was used to bring out the thymus and identified lymph nodes through the subxiphoid port, which was passed off for permanent pathologic analysis. The cavity was inspected for hemostasis, which was noted.  Two 24 [place] Rufus drains were placed, one in each chest, traversing into the mediastinum.  These were secured to the skin using 0 silk suture.  The gas was allowed to escape and the lungs were observed to inflate appropriately.  The incisions were irrigated and closed.  The areas were cleaned and dried and benzoin and steri-strips were applied.  Sterile dressings were applied to the chest tube sites, which were connected to a single Pleur-evac.  At  the end of the case, sponge, needle, and instrument counts were correct.     There were no qualified residents available, so JOSELO León acted as my bedside assistant for the entire case

## 2024-10-26 NOTE — PLAN OF CARE
Goal Outcome Evaluation: adequate for transition      Plan of Care Reviewed With: patient, child    Overall Patient Progress: improvingOverall Patient Progress: improving    Pt VSS on RA discharged home today with son. PIV removed. AVS printed and handed out to patient. Instructions explained, questions answered, no further questions noted per patient. Pt educated on the use of opioids. Belongings taken with son and meds picked up from pharmacy by son per pt request. Transport arranged to bring pt to lobby.

## 2024-10-26 NOTE — PLAN OF CARE
"Goal Outcome Evaluation:  /72 (BP Location: Right arm)   Pulse 83   Temp 98.5  F (36.9  C) (Oral)   Resp 16   Ht 1.651 m (5' 5\")   Wt 54.2 kg (119 lb 7.8 oz)   SpO2 97%   BMI 19.88 kg/m     AVSS.    Neuro: WDL.     GI/: abdomen soft. Passing gas. No BM. Voiding not saving.     Diet: Regular diet. Fair po intake. No c/o nausea.    Incisions/Drains: CT Removed.     IV Access: PIV x 2 - sl.     Labs: reviewed.     Activity: OOB to chair with minimal assist. Walked on unit with nursing using walker and was seen again by PT. Walking well.     Pain: oxycodone 2.5mg and ibuprofen for pain. Reports pain with activity otherwise 2/10 ar rest.     New changes this shift: CT removed & post removal CXR done.     Plan: Possible discharge home today.           "

## 2024-10-26 NOTE — PROGRESS NOTES
[facility]    Progress Note - Thoracic Surgery Service       Date of Admission:  10/24/2024    Assessment & Plan: Surgery   Ms. Cespedes is a 84 year old woman with a significant PMH of well controlled ocular myasthenia gravis who underwent a robotic assisted thymectomy through a subxiphoid approach on 10/25.     Patient progressing appropriately post-operatively. Will remove CT today. Possible discharge home later today.     - Chest tube removal today   - Post-pull CXR at 1130  - [facility] including tylenol, dilaudid, oxycodone available  - PTA atorvastatin, finasteride  - Diet regular  - Lovenox for DVT prophylaxis  - PTA MMF  - PT/OT  - Bowel regimen  - Daily CBC BMP Mg Phos  - Discharge later today so long as no pathologies identified on post-pull CXR    PPx: lovenox, SCDs  Dispo: home likely today vs tmrw    D/w staff, Dr Nguyen.  Leydi Ordonez MD PGY3  [facility] General Surgery   _____________________________________________________________________    Interval History   Had an episode of low BP yesterday (SBP high 80s) which responded very well to small fluid bolus (LR 250cc)    Had a good night. Was able to get a few bouts of sleep. Does intermittently experience significant pain when she gets up, along her anterior chest. Ambulating, voiding, tolerating diet, albeit eating very little due to baseline poor appetite.       Vital Signs:  Temp: 98.5  F (36.9  C) Temp src: Oral BP: 122/72 Pulse: 83   Resp: 16 SpO2: 97 % O2 Device: None (Room air)      Exam  AOx4  Neurologically intact upon gross examination  Abdomen flat and non distended  Skin warm and appearing well perfused  Incision sites are dry and clean  CT with serosang output in atrium, no air leak     LABS  Normal CBC  BMP pending    CXR  No pneumothorax or focal abnormalities. Subcutaneous emphysema of right chest wall present.

## 2024-10-26 NOTE — PLAN OF CARE
"[id number]:    Vital signs:  Temp: 98.8  F (37.1  C) Temp src: Oral BP: 112/66 Pulse: 83   Resp: 16 SpO2: 95 % O2 Device: None (Room air)  Height: 165.1 cm (5' 5\") Weight: 54.2 kg (119 lb 7.8 oz)    Problem: Adult Inpatient Plan of Care  Goal: Optimal Comfort and Wellbeing  Intervention: Monitor Pain and Promote Comfort  Recent Flowsheet Documentation  Taken 10/26/2024 0005 by Eloise Garcia RN  Pain Management Interventions: medication (see MAR)     Problem: Surgery Nonspecified  Goal: Fluid and Electrolyte Balance  Outcome: Progressing  Intervention: Monitor and Manage Fluid and Electrolyte Balance  Recent Flowsheet Documentation  Taken 10/26/2024 0005 by Eloise Garcia RN  Fluid/Electrolyte Management: fluids provided     Activity: Up to bathroom with assist of one, gait belt, and walker.   Neuros: A & O x4. Neuro intact.  Cardiac: HR 80s, /66, asymptomatic. On cardiac monitoring, sinus rhythm with peaked T waves.   Respiratory: LS diminished in bases. O2 sats above 92% on RA. Denies SOB. Unlabored. CT to water seal with no air leak, 70cc serosanguinous output, small dried drainage on CT site dressing. Refused IS, encouraged deep breathing and coughing and IS.   GI/: BS+, no BM. Large incontinence x1, voided small minimal clear surjit urine in hat.   Diet: Regular diet. Encouraged oral fluid intake.  Skin: Mid chest incision site has small dried drainage, right lateral incision dressing c/d/I.   Lines: PIV saline locked.  Labs: Reviewed.   Pain/nausea: Patient states does not like to take narcotics. PRN oxycodone 2.5mg every four hours PRN effective for chest pain control, patient states pain worsens with movement. Denies nausea.   New changes this shift: None.  Plan: Continue POC.     "

## 2024-10-27 NOTE — CARE PLAN
Occupational Therapy Discharge Summary    Reason for therapy discharge:    Discharged to home.    Progress towards therapy goal(s). See goals on Care Plan in [facility] electronic health record for goal details.  Goals partially met.  Barriers to achieving goals:   discharge from facility.    Therapy recommendation(s):    Continued therapy is recommended.  Rationale/Recommendations:  maximize rehab potential.

## 2024-10-28 ENCOUNTER — PATIENT OUTREACH (OUTPATIENT)
Dept: ONCOLOGY | Facility: CLINIC | Age: 84
End: 2024-10-28
Payer: MEDICARE

## 2024-10-28 LAB
ATRIAL RATE - MUSE: 78 BPM
DIASTOLIC BLOOD PRESSURE - MUSE: NORMAL MMHG
INTERPRETATION ECG - MUSE: NORMAL
P AXIS - MUSE: 45 DEGREES
PR INTERVAL - MUSE: 156 MS
QRS DURATION - MUSE: 86 MS
QT - MUSE: 398 MS
QTC - MUSE: 453 MS
R AXIS - MUSE: -7 DEGREES
SYSTOLIC BLOOD PRESSURE - MUSE: NORMAL MMHG
T AXIS - MUSE: 39 DEGREES
VENTRICULAR RATE- MUSE: 78 BPM

## 2024-10-28 NOTE — TELEPHONE ENCOUNTER
Writer attempted to contact patient to complete post-op/post-discharge follow up. Unable to connect with patient. Writer left VM with triage numbers and clinic contact number for follow up.     Jammie Chacko RN on 10/28/2024 at 2:49 PM

## 2024-10-29 NOTE — PROGRESS NOTES
"Writer called patient back after receiving VM from her. She indicates she is doing \"really, really well!\". She reports she took 1/2 tab oxycodone yesterday and 2 Advil last night, and has not needed anything since then for pain manamgement. She reports she had a couple of Bms today (had not had one yet), for which she felt very relieved. She states she is eating and drinking well, denies and N/V (was meeting her son for lunch at the time of the call). She reports her wounds (incisions) are healing without issues. Endorses some fatigue, but is up ambulating.     Writer encouraged patient to increase her activity as she can tolerate it, and continue with good PO food and fluid in-take. Patient aware to contact our team with any adverse symptoms.     She is inquiring now that her surgery is complete, how soon she can get the Flu and RSV vaccine. She would like follow up via [facility]. Will route to provider for recommendation.    Jammie Chacko RN on 10/29/2024 at 12:21 PM    ADDENDUM    José Miguel Nguyen MD  You31 minutes ago (10:07 AM)     At least 3 weeks postop should be ok.     Writer updated patient via [employer].     Jammie Chacko RN on 10/30/2024 at 10:41 AM    "

## 2024-11-04 LAB
PATH REPORT.COMMENTS IMP SPEC: NORMAL
PATH REPORT.COMMENTS IMP SPEC: NORMAL
PATH REPORT.FINAL DX SPEC: NORMAL
PATH REPORT.GROSS SPEC: NORMAL
PATH REPORT.MICROSCOPIC SPEC OTHER STN: NORMAL
PATH REPORT.RELEVANT HX SPEC: NORMAL
PATHOLOGY SYNOPTIC REPORT: NORMAL
PHOTO IMAGE: NORMAL

## 2024-11-04 NOTE — RESULT ENCOUNTER NOTE
I have personally reviewed the pathology results which support a diagnosis of thymoma.  Sites involved in this diagnosis include: thymus. This is a stage I, modified Masaoka stage I and will be followed with serial imaging.      José Miguel Nguyen MD

## 2024-11-08 DIAGNOSIS — D49.89 THYMOMA: Primary | ICD-10-CM

## 2024-11-08 DIAGNOSIS — J90 PLEURAL EFFUSION: Primary | ICD-10-CM

## 2024-11-08 RX ORDER — FUROSEMIDE 20 MG/1
20 TABLET ORAL DAILY
Qty: 5 TABLET | Refills: 0 | Status: SHIPPED | OUTPATIENT
Start: 2024-11-08 | End: 2024-11-12

## 2024-11-11 ENCOUNTER — TELEPHONE (OUTPATIENT)
Dept: ONCOLOGY | Facility: CLINIC | Age: 84
End: 2024-11-11
Payer: MEDICARE

## 2024-11-11 NOTE — TELEPHONE ENCOUNTER
"Writer called patient for follow up. Patient indicates that she is \"making her self eat a little bit every hour\"; is drinking lots of water. She reports she is up and ambulating (changed her furnace filter today). Denies any dizziness/lightheadedness. She does states she gets \"out of breath with activity, but it is not severe. Not like it was last week\". Reports this resolves with rest. Does indicated that her work of breathing is more difficult when she lies down.    Does endorse continued discomfort in bilateral upper chest area, with deep breathing only. Denies any chest heaviness. Reports her ankle swelling is improved \"by half\".     She confirms she is taking her prescribed Lasix, and indicates it was \"very effective initially, but has slowed down since then\". She reports her UTI symptoms have resolved (no burning/pain with urination, no frequency~ other than what is expected with Lasix). She reports she has follow up with [facility] team on Thursday.     Writer reviewed emergent symptoms to monitor for and advised patient to seek immediate medical attention/call 911 if these become present. Will route to providers for further follow up.     Jammie Chacko RN on 11/11/2024 at 2:55 PM    "

## 2024-11-11 NOTE — TELEPHONE ENCOUNTER
Pt is calling back with an update for her care team after starting lasix on 11/8/2024. See also [employer] messages dated 11/4/2024  Pt reports that she started to notice an improvement in her shortness of breath almost immediately after starting the lasix on 11/8/2024. Shortness of breath was better on 11/9 and 11/10, but seems to be slightly worse today, especially with activity. Overall shortness of breath has improved from 11/8/2024.Denies any wheezing.  States that the swelling in her bilateral ankles has decreased by about 50%.   Pt reports that she woke up drenched in sweat last night. When she woke up today, she had some shaking tremors, but this has resolved.  Pt denies any fever. Current temperature is 97.3 degrees F.   Pt reports that her biggest ongoing complaint is having no energy and no appetite.   Pain in upper left chest just below clavicle continues to improve and is managed by advil alone. She has not taken any oxycodone for about 2 weeks.    Denies any new chest pain, shortness of breath, wheezing, weakness on one side of her body, or other new/urgent symptoms.    Pt will continue with small snacks throughout the day. States that she has been making snacks with protein powder.     Pt states that she would like to talk to  GERTRUDE Agudelo, when she has time today.    Will route update to care team.    Patient verbalized understanding and agreement with plan.  Patient was instructed to call the clinic with any questions, concerns, or worsening symptoms.  Alice Adrian RN on 11/11/2024 at 11:14 AM

## 2024-11-11 NOTE — TELEPHONE ENCOUNTER
José Miguel Nguyen MD Keppler, Gina L, RN; Leonela Caldwell APRN CNS33 minutes ago (3:49 PM)       She can get the CXR today to make sure there isn't a huge effusion     Writer called pt and notified with recommendations per Dr Nguyen.    She would be willing to come for CXR tonight, but would prefer to wait until tomorrow.    Writer called imaging scheduling team to check on appt availability. There are no openings for CXR today.  Pt has been scheduled for CXR tomorrow morning at 9:30.    Writer advised pt that if symptoms worsen or if pt develops new/urgent symptoms that were reviewed with pt, she should be seen in ED.  Also reminded pt that nurse line is available 24/7 at any time by calling the main clinic number with any questions or concerns.    Patient verbalized understanding and agreement with plan.  Patient was instructed to call the clinic with any questions, concerns, or worsening symptoms.  Alice Adrian RN on 11/11/2024 at 4:38 PM

## 2024-11-12 ENCOUNTER — HOSPITAL ENCOUNTER (OUTPATIENT)
Dept: GENERAL RADIOLOGY | Facility: CLINIC | Age: 84
Discharge: HOME | End: 2024-11-12
Attending: CLINICAL NURSE SPECIALIST | Admitting: CLINICAL NURSE SPECIALIST
Payer: MEDICARE

## 2024-11-12 DIAGNOSIS — D49.89 THYMOMA: ICD-10-CM

## 2024-11-12 DIAGNOSIS — J90 PLEURAL EFFUSION: ICD-10-CM

## 2024-11-12 PROCEDURE — 71046 X-RAY EXAM CHEST 2 VIEWS: CPT

## 2024-11-12 RX ORDER — FUROSEMIDE 20 MG/1
20 TABLET ORAL DAILY
Qty: 5 TABLET | Refills: 0 | Status: SHIPPED | OUTPATIENT
Start: 2024-11-12

## 2024-11-12 NOTE — TELEPHONE ENCOUNTER
"Leonela Caldwell APRN CNS  You; José Miguel Nguyen MD; Leonela Caldwell APRN CNS33 minutes ago (1:23 PM)     Hi Jammie,    I sent the refill in. I also placed an US thoracentesis order (as well as cytology order for them to send with the pleural fluid). I also placed a CXR order.    Let's have her get the CXR after the next course of Lasix.    Thanks  H      You  You; José Miguel Nguyen MD; Leonela Caldwell APRN CNS1 hour ago (12:03 PM)     Thank you for the update/assistance. Please place orders, and I can let the patient know. Should I  move the chest xray that was going to be on the Friday to next Monday (when her next course of Lasix would be done)? Or plan to keep for this Friday to check on progress?      José Miguel Nguyen MD  You; Leonela Caldwell APRN CNS2 hours ago (11:55 AM)     Sounds like a plan. Sergio you, Leonela.      Leonela Caldwell APRN CNS  You; José Miguel Nguyen MD; Leonela Caldwell APRN CNS2 hours ago (11:24 AM)       Looks like she still has a decent amount of pleural fluid. We could consider another 5 days course of Lasix and in the meantime get her set up for a thoracentesis. If the effusion resolves then we can cancel the thoracentesis.    José Miguel-you ok with that plan?    Thanks     Writer called patient to update her of the above. Writer explained thoracentesis procedure to patient. Patient reports that her ankle swelling is slowly improving, but states it has \"plateau-ed a bit yesterday though\".    Writer advised patient to continue on Lasix, as prescribed, and that scheduling team will outreach to reschedule chest x-ray and thoracentesis. Patient aware to contact our team if her symptoms don't improve or worsen in the next 5 days. Writer reviewed urgent/emergent symptoms that would warrant immediate medical attention.    Jammie Chacko RN on 11/12/2024 at 3:14 PM        "

## 2024-11-18 ENCOUNTER — ANCILLARY PROCEDURE (OUTPATIENT)
Dept: GENERAL RADIOLOGY | Facility: CLINIC | Age: 84
End: 2024-11-18
Attending: CLINICAL NURSE SPECIALIST
Payer: MEDICARE

## 2024-11-18 DIAGNOSIS — J90 PLEURAL EFFUSION: ICD-10-CM

## 2024-11-18 PROCEDURE — 71046 X-RAY EXAM CHEST 2 VIEWS: CPT | Mod: TC | Performed by: RADIOLOGY

## 2024-11-21 ENCOUNTER — TELEPHONE (OUTPATIENT)
Dept: SURGERY | Facility: CLINIC | Age: 84
End: 2024-11-21

## 2024-11-21 NOTE — TELEPHONE ENCOUNTER
Ms. Cespedes is doing well and has no pain at this time. Her breathing is improving and her pulse is in the 80s. We discussed the pathology and I explained that she needs no further therapy. She will be followed with serial annual CT scans of the chest.

## 2024-11-25 ENCOUNTER — ANCILLARY PROCEDURE (OUTPATIENT)
Dept: GENERAL RADIOLOGY | Facility: CLINIC | Age: 84
End: 2024-11-25
Attending: THORACIC SURGERY (CARDIOTHORACIC VASCULAR SURGERY)
Payer: MEDICARE

## 2024-11-25 DIAGNOSIS — D49.89 ANTERIOR MEDIASTINAL TUMOR: ICD-10-CM

## 2024-11-25 PROCEDURE — 71046 X-RAY EXAM CHEST 2 VIEWS: CPT | Mod: TC | Performed by: RADIOLOGY

## 2024-11-25 NOTE — PROGRESS NOTES
Virtual Visit Details    Type of service:  Video Visit   Video Start Time: 3:25 PM  Video End Time:3:51 PM    Originating Location (pt. Location): Home    Distant Location (provider location):  Off-site  Platform used for Video Visit: [facility]    THORACIC SURGERY FOLLOW UP VISIT      I saw Ms. Cespedes in follow-up today. 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  -Tumor is encapsulated, with no capsular invasion and no invasion into perithymic fat identified.  -Margins are negative.  -Two benign lymph nodes.  -AJCC pathologic staging is pT1a N0; modified Masaoka stage I.    COMPLICATIONS  None    INTERVAL STUDIES  CXR 11/25/2024: Suggestion of small bibasilar pleural fluid. No convincing focal airspace disease. No specific mediastinal abnormality at chest x-ray.            Past Medical History:   Diagnosis Date    Aortic stenosis     Dyslipidemia     Myasthenia gravis without exacerbation (H)       Past Surgical History:   Procedure Laterality Date    COLONOSCOPY      DAVINCI THYMECTOMY Bilateral 10/24/2024    Procedure: Robot-Assisted Thoracoscopic Thymectomy;  Surgeon: José Miguel Nguyen MD;  Location: [facility] OR    skin cancer removal      TUBAL LIGATION        Social History     Socioeconomic History    Marital status:      Spouse name: Not on file    Number of children: Not on file    Years of education: Not on file    Highest education level: Not on file   Occupational History    Not on file   Tobacco Use    Smoking status: Never    Smokeless tobacco: Never   Substance and Sexual Activity    Alcohol use: Yes     Alcohol/week: 1.0 standard drink of alcohol     Types: 1 Glasses of wine per week     Comment: 1/4 glass wine 1-2x week    Drug use: Never    Sexual activity: Not Currently      Partners: Male   Other Topics Concern    Not on file   Social History Narrative    Not on file     Social Drivers of Health     Financial Resource Strain: Low Risk  (10/25/2024)    Financial Resource Strain     Within the past 12 months, have you or your family members you live with been unable to get utilities (heat, electricity) when it was really needed?: No   Food Insecurity: Low Risk  (10/25/2024)    Food Insecurity     Within the past 12 months, did you worry that your food would run out before you got money to buy more?: No     Within the past 12 months, did the food you bought just not last and you didn t have money to get more?: No   Transportation Needs: Low Risk  (10/25/2024)    Transportation Needs     Within the past 12 months, has lack of transportation kept you from medical appointments, getting your medicines, non-medical meetings or appointments, work, or from getting things that you need?: No   Physical Activity: Sufficiently Active (6/2/2024)    Received from [facility]    Exercise Vital Sign     Days of Exercise per Week: 7 days     Minutes of Exercise per Session: 40 min   Stress: No Stress Concern Present (3/30/2023)    Received from [facility], [facility]    [place] of Occupational Health - Occupational Stress Questionnaire     Feeling of Stress : Not at all   Social Connections: Moderately Isolated (3/30/2023)    Received from [facility], [facility]    Social Connection and Isolation Panel [NHANES]     Frequency of Communication with Friends and Family: More than three times a week     Frequency of Social Gatherings with Friends and Family: Once a week     Attends Mu-ism Services: Never     Active Member of Clubs or Organizations: No     Attends Club or Organization Meetings: Never     Marital Status:    Interpersonal Safety: Low Risk  (10/25/2024)    Interpersonal Safety     Do you feel physically and emotionally safe where you currently live?: Yes     Within the  past 12 months, have you been hit, slapped, kicked or otherwise physically hurt by someone?: No     Within the past 12 months, have you been humiliated or emotionally abused in other ways by your partner or ex-partner?: No   Housing Stability: High Risk (10/25/2024)    Housing Stability     Do you have housing? : No     Are you worried about losing your housing?: No      SUBJECTIVE   Zahida is doing well. She is closer to feeling like her usual self. She is pretty active-yesterday she was outside putting up [employer] decorations. She does get tired easily and has lost some weight. Her current weight is ~111#. Her appetite is not what is used to be prior to surgery. The swelling in her legs is nearly gone now. She did have an episode of a-fib when she was at her primary doctor's office. She has an appointment with Cardiology but not until February because they want her to have an ECHO and wear a Holter monitor first so they can have those results for the visit.    From a personal perspective, her daughter, Lore, joins us for today's video visit.    LAYNE Teague is an 84 year-old female status post robot assisted subxiphoid thoracoscopic thymectomy.     We had a detailed discussion about typical post operative recovery timelines. I encouraged her to continue being active and to work on eating more. She can try having several small meals throughout the day or have snacks between meals. It is important to get extra calories in because of the body's increased calorie needs during this recovery period. As she starts to eat more and put on some weight she will have more energy. Right now she is in a calorie deficit which will make her fatigued.    I reviewed the final pathology. I will see her back in 3 months with a new baseline chest CT and after that we will do annual chest CTs for 10 years.    PLAN  I spent 25 min on the date of the encounter in chart review, patient visit, review of tests, documentation and/or  discussion with other providers about the issues documented above. I reviewed the plan as follows:  Follow up with me in 3 months with a new baseline chest CT prior  All questions were answered and the patient and present family were in agreement with the plan.  I appreciate the opportunity to participate in the care of your patient and will keep you updated.  Sincerely,    SANTIAGO Collins CNS

## 2024-11-26 ENCOUNTER — VIRTUAL VISIT (OUTPATIENT)
Dept: SURGERY | Facility: CLINIC | Age: 84
End: 2024-11-26
Attending: CLINICAL NURSE SPECIALIST
Payer: MEDICARE

## 2024-11-26 VITALS — BODY MASS INDEX: 18.49 KG/M2 | HEIGHT: 65 IN | WEIGHT: 111 LBS

## 2024-11-26 DIAGNOSIS — D49.89 THYMOMA: Primary | ICD-10-CM

## 2024-11-26 PROCEDURE — 99024 POSTOP FOLLOW-UP VISIT: CPT | Mod: 95 | Performed by: CLINICAL NURSE SPECIALIST

## 2024-11-26 ASSESSMENT — PAIN SCALES - GENERAL: PAINLEVEL_OUTOF10: NO PAIN (0)

## 2024-11-26 NOTE — NURSING NOTE
Is the patient currently in the state of [place]? YES    Visit mode:VIDEO    If the visit is dropped, the patient can be reconnected by:VIDEO VISIT: Send to e-mail at: [email or web address]    Will anyone else be joining the visit? NO  (If patient encounters technical issues they should call [phone]    Are changes needed to the allergy or medication list? No    Are refills needed on medications prescribed by this physician? NO    Rooming Documentation:  Questionnaire(s) completed    Reason for visit: Video Visit (Follow UP )    Cayla SILVER

## 2024-11-26 NOTE — LETTER
11/26/2024      Zahida Cespedes  [address] [facility] [id number]      Dear Colleague,    Thank you for referring your patient, Zahida Cespedes, to the [facility]. Please see a copy of my visit note below.    Virtual Visit Details    Type of service:  Video Visit   Video Start Time: 3:25 PM  Video End Time:3:51 PM    Originating Location (pt. Location): Home    Distant Location (provider location):  Off-site  Platform used for Video Visit: [facility]    THORACIC SURGERY FOLLOW UP VISIT      I saw Ms. Cespedes in follow-up today. 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  -Tumor is encapsulated, with no capsular invasion and no invasion into perithymic fat identified.  -Margins are negative.  -Two benign lymph nodes.  -AJCC pathologic staging is pT1a N0; modified Masaoka stage I.    COMPLICATIONS  None    INTERVAL STUDIES  CXR 11/25/2024: Suggestion of small bibasilar pleural fluid. No convincing focal airspace disease. No specific mediastinal abnormality at chest x-ray.            Past Medical History:   Diagnosis Date     Aortic stenosis      Dyslipidemia      Myasthenia gravis without exacerbation (H)       Past Surgical History:   Procedure Laterality Date     COLONOSCOPY       DAVINCI THYMECTOMY Bilateral 10/24/2024    Procedure: Robot-Assisted Thoracoscopic Thymectomy;  Surgeon: José Miguel Nguyen MD;  Location: [facility] OR     skin cancer removal       TUBAL LIGATION        Social History     Socioeconomic History     Marital status:      Spouse name: Not on file     Number of children: Not on file     Years of education: Not on file     Highest education level: Not on file   Occupational History     Not on file   Tobacco Use     Smoking status:  Never     Smokeless tobacco: Never   Substance and Sexual Activity     Alcohol use: Yes     Alcohol/week: 1.0 standard drink of alcohol     Types: 1 Glasses of wine per week     Comment: 1/4 glass wine 1-2x week     Drug use: Never     Sexual activity: Not Currently     Partners: Male   Other Topics Concern     Not on file   Social History Narrative     Not on file     Social Drivers of Health     Financial Resource Strain: Low Risk  (10/25/2024)    Financial Resource Strain      Within the past 12 months, have you or your family members you live with been unable to get utilities (heat, electricity) when it was really needed?: No   Food Insecurity: Low Risk  (10/25/2024)    Food Insecurity      Within the past 12 months, did you worry that your food would run out before you got money to buy more?: No      Within the past 12 months, did the food you bought just not last and you didn t have money to get more?: No   Transportation Needs: Low Risk  (10/25/2024)    Transportation Needs      Within the past 12 months, has lack of transportation kept you from medical appointments, getting your medicines, non-medical meetings or appointments, work, or from getting things that you need?: No   Physical Activity: Sufficiently Active (6/2/2024)    Received from [facility]    Exercise Vital Sign      Days of Exercise per Week: 7 days      Minutes of Exercise per Session: 40 min   Stress: No Stress Concern Present (3/30/2023)    Received from [facility], [facility]    [place] of Occupational Health - Occupational Stress Questionnaire      Feeling of Stress : Not at all   Social Connections: Moderately Isolated (3/30/2023)    Received from [facility], [facility]    Social Connection and Isolation Panel [NHANES]      Frequency of Communication with Friends and Family: More than three times a week      Frequency of Social Gatherings with Friends and Family: Once a week      Attends Druze Services: Never       Active Member of Clubs or Organizations: No      Attends Club or Organization Meetings: Never      Marital Status:    Interpersonal Safety: Low Risk  (10/25/2024)    Interpersonal Safety      Do you feel physically and emotionally safe where you currently live?: Yes      Within the past 12 months, have you been hit, slapped, kicked or otherwise physically hurt by someone?: No      Within the past 12 months, have you been humiliated or emotionally abused in other ways by your partner or ex-partner?: No   Housing Stability: High Risk (10/25/2024)    Housing Stability      Do you have housing? : No      Are you worried about losing your housing?: No      SUBJECTIVE   Zahida is doing well. She is closer to feeling like her usual self. She is pretty active-yesterday she was outside putting up [employer] decorations. She does get tired easily and has lost some weight. Her current weight is ~111#. Her appetite is not what is used to be prior to surgery. The swelling in her legs is nearly gone now. She did have an episode of a-fib when she was at her primary doctor's office. She has an appointment with Cardiology but not until February because they want her to have an ECHO and wear a Holter monitor first so they can have those results for the visit.    From a personal perspective, her daughter, Lore, joins us for today's video visit.    LAYNE Teague is an 84 year-old female status post robot assisted subxiphoid thoracoscopic thymectomy.     We had a detailed discussion about typical post operative recovery timelines. I encouraged her to continue being active and to work on eating more. She can try having several small meals throughout the day or have snacks between meals. It is important to get extra calories in because of the body's increased calorie needs during this recovery period. As she starts to eat more and put on some weight she will have more energy. Right now she is in a calorie deficit which will make  her fatigued.    I reviewed the final pathology. I will see her back in 3 months with a new baseline chest CT and after that we will do annual chest CTs for 10 years.    PLAN  I spent 25 min on the date of the encounter in chart review, patient visit, review of tests, documentation and/or discussion with other providers about the issues documented above. I reviewed the plan as follows:  Follow up with me in 3 months with a new baseline chest CT prior  All questions were answered and the patient and present family were in agreement with the plan.  I appreciate the opportunity to participate in the care of your patient and will keep you updated.  Sincerely,    SANTIAGO Collins       Again, thank you for allowing me to participate in the care of your patient.        Sincerely,        SANTIAGO Collins CNS

## 2025-02-02 DIAGNOSIS — G70.00 OCULAR MYASTHENIA (H): ICD-10-CM

## 2025-02-03 RX ORDER — MYCOPHENOLATE MOFETIL 500 MG/1
500 TABLET ORAL DAILY
Qty: 90 TABLET | Refills: 2 | Status: SHIPPED | OUTPATIENT
Start: 2025-02-03

## 2025-02-24 NOTE — PROGRESS NOTES
Virtual Visit Details    Type of service:  Video Visit   Video Start Time: 2:50 PM  Video End Time:3:06 PM    Originating Location (pt. Location): Home    Distant Location (provider location):  Off-site  Platform used for Video Visit: [facility]    THORACIC SURGERY FOLLOW UP VISIT      I saw Ms. Cespedes in follow-up today. 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  -Tumor is encapsulated, with no capsular invasion and no invasion into perithymic fat identified.  -Margins are negative.  -Two benign lymph nodes.  -AJCC pathologic staging is pT1a N0; modified Masaoka stage I.    COMPLICATIONS  None    INTERVAL STUDIES  CT chest 02/20/2025:  1.  No evidence to suggest new disease. Stable right middle lobe indeterminate pulmonary nodule.  2.  Interval resolution of pleural effusions.    Past Medical History:   Diagnosis Date    Aortic stenosis     Dyslipidemia     Myasthenia gravis without exacerbation (H)       Past Surgical History:   Procedure Laterality Date    COLONOSCOPY      DAVINCI THYMECTOMY Bilateral 10/24/2024    Procedure: Robot-Assisted Thoracoscopic Thymectomy;  Surgeon: José Miguel Nguyen MD;  Location: [facility] OR    skin cancer removal      TUBAL LIGATION        Social History     Socioeconomic History    Marital status:      Spouse name: Not on file    Number of children: Not on file    Years of education: Not on file    Highest education level: Not on file   Occupational History    Not on file   Tobacco Use    Smoking status: Never    Smokeless tobacco: Never   Substance and Sexual Activity    Alcohol use: Yes     Alcohol/week: 1.0 standard drink of alcohol     Types: 1 Glasses of wine per week     Comment: 1/4 glass wine 1-2x week    Drug use: Never    Sexual activity: Not  Currently     Partners: Male   Other Topics Concern    Not on file   Social History Narrative    Not on file     Social Drivers of Health     Financial Resource Strain: Low Risk  (10/25/2024)    Financial Resource Strain     Within the past 12 months, have you or your family members you live with been unable to get utilities (heat, electricity) when it was really needed?: No   Food Insecurity: No Food Insecurity (12/20/2024)    Received from [facility]    Hunger Vital Sign     Worried About Running Out of Food in the Last Year: Never true     Ran Out of Food in the Last Year: Never true   Transportation Needs: No Transportation Needs (12/20/2024)    Received from [facility]    PRAPARE - Transportation     Lack of Transportation (Medical): No     Lack of Transportation (Non-Medical): No   Physical Activity: Sufficiently Active (12/20/2024)    Received from [facility]    Exercise Vital Sign     Days of Exercise per Week: 7 days     Minutes of Exercise per Session: 60 min   Stress: No Stress Concern Present (3/30/2023)    Received from [facility]    [employer]izen of [place] of Occupational Health - Occupational Stress Questionnaire     Feeling of Stress : Not at all   Social Connections: Moderately Isolated (3/30/2023)    Received from [facility], [facility]    Social Connection and Isolation Panel [NHANES]     Frequency of Communication with Friends and Family: More than three times a week     Frequency of Social Gatherings with Friends and Family: Once a week     Attends Scientology Services: Never     Active Member of Clubs or Organizations: No     Attends Club or Organization Meetings: Never     Marital Status:    Interpersonal Safety: Low Risk  (10/25/2024)    Interpersonal Safety     Do you feel physically and emotionally safe where you currently live?: Yes     Within the past 12 months, have you been hit, slapped, kicked or otherwise physically hurt by someone?: No     Within the past 12 months,  have you been humiliated or emotionally abused in other ways by your partner or ex-partner?: No   Housing Stability: Low Risk  (12/20/2024)    Received from [facility]    Housing Stability     What is your living situation today?: I have a steady place to live   Recent Concern: Housing Stability - High Risk (10/25/2024)    Housing Stability     Do you have housing? : No     Are you worried about losing your housing?: No        SUBJECTIVE   Zahida is doing ok. She is still trying to gain some weight but her appetite is not very strong. She had hurt her back doing some stretching a while back and after numerous tests that did not show anything, she went to a massage therapist who did a trigger point release on her and she has not had back pain since. She is working with PT to regain strength and stamina. She is keeping a food journal and was surprised that some of the foods did not have as many calories as she thought. She meets with a Registered Dietician in April down at [place].      LAYNE Teague is an 85 year-old female status post robot assisted subxiphoid thoracoscopic thymectomy. Her new baseline chest CT is stable.     PLAN  I spent 25 min on the date of the encounter in chart review, patient visit, review of tests, documentation and/or discussion with other providers about the issues documented above. I reviewed the plan as follows:  Follow up with me in 1 year with a chest CT prior  All questions were answered and the patient and present family were in agreement with the plan.  I appreciate the opportunity to participate in the care of your patient and will keep you updated.  Sincerely,    SANTIAGO Collins CNS

## 2025-02-25 ENCOUNTER — VIRTUAL VISIT (OUTPATIENT)
Dept: SURGERY | Facility: CLINIC | Age: 85
End: 2025-02-25
Attending: CLINICAL NURSE SPECIALIST
Payer: MEDICARE

## 2025-02-25 VITALS — WEIGHT: 112 LBS | BODY MASS INDEX: 18.66 KG/M2 | HEIGHT: 65 IN

## 2025-02-25 DIAGNOSIS — D49.89 THYMOMA: Primary | ICD-10-CM

## 2025-02-25 PROCEDURE — 1126F AMNT PAIN NOTED NONE PRSNT: CPT | Mod: 95 | Performed by: CLINICAL NURSE SPECIALIST

## 2025-02-25 PROCEDURE — 98005 SYNCH AUDIO-VIDEO EST LOW 20: CPT | Performed by: CLINICAL NURSE SPECIALIST

## 2025-02-25 ASSESSMENT — PAIN SCALES - GENERAL: PAINLEVEL_OUTOF10: NO PAIN (0)

## 2025-02-25 NOTE — LETTER
2/25/2025      Zahida Cespedes  [address] [facility] [address]      Dear Colleague,    Thank you for referring your patient, Zahida Cespedes, to the [facility]. Please see a copy of my visit note below.    Virtual Visit Details    Type of service:  Video Visit   Video Start Time: 2:50 PM  Video End Time:3:06 PM    Originating Location (pt. Location): Home    Distant Location (provider location):  Off-site  Platform used for Video Visit: [facility]    THORACIC SURGERY FOLLOW UP VISIT      I saw Ms. Cespedes in follow-up today. 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  -Tumor is encapsulated, with no capsular invasion and no invasion into perithymic fat identified.  -Margins are negative.  -Two benign lymph nodes.  -AJCC pathologic staging is pT1a N0; modified Masaoka stage I.    COMPLICATIONS  None    INTERVAL STUDIES  CT chest 02/20/2025:  1.  No evidence to suggest new disease. Stable right middle lobe indeterminate pulmonary nodule.  2.  Interval resolution of pleural effusions.    Past Medical History:   Diagnosis Date     Aortic stenosis      Dyslipidemia      Myasthenia gravis without exacerbation (H)       Past Surgical History:   Procedure Laterality Date     COLONOSCOPY       DAVINCI THYMECTOMY Bilateral 10/24/2024    Procedure: Robot-Assisted Thoracoscopic Thymectomy;  Surgeon: José Miguel Nguyen MD;  Location: [facility] OR     skin cancer removal       TUBAL LIGATION        Social History     Socioeconomic History     Marital status:      Spouse name: Not on file     Number of children: Not on file     Years of education: Not on file     Highest education level: Not on file   Occupational History     Not on file   Tobacco Use     Smoking  status: Never     Smokeless tobacco: Never   Substance and Sexual Activity     Alcohol use: Yes     Alcohol/week: 1.0 standard drink of alcohol     Types: 1 Glasses of wine per week     Comment: 1/4 glass wine 1-2x week     Drug use: Never     Sexual activity: Not Currently     Partners: Male   Other Topics Concern     Not on file   Social History Narrative     Not on file     Social Drivers of Health     Financial Resource Strain: Low Risk  (10/25/2024)    Financial Resource Strain      Within the past 12 months, have you or your family members you live with been unable to get utilities (heat, electricity) when it was really needed?: No   Food Insecurity: No Food Insecurity (12/20/2024)    Received from [facility]    Hunger Vital Sign      Worried About Running Out of Food in the Last Year: Never true      Ran Out of Food in the Last Year: Never true   Transportation Needs: No Transportation Needs (12/20/2024)    Received from [facility]    PRAPARE - Transportation      Lack of Transportation (Medical): No      Lack of Transportation (Non-Medical): No   Physical Activity: Sufficiently Active (12/20/2024)    Received from [facility]    Exercise Vital Sign      Days of Exercise per Week: 7 days      Minutes of Exercise per Session: 60 min   Stress: No Stress Concern Present (3/30/2023)    Received from [facility], [facility]    [place] [employer] - Occupational Stress Questionnaire      Feeling of Stress : Not at all   Social Connections: Moderately Isolated (3/30/2023)    Received from [facility], [facility]    Social Connection and Isolation Panel [NHANES]      Frequency of Communication with Friends and Family: More than three times a week      Frequency of Social Gatherings with Friends and Family: Once a week      Attends Judaism Services: Never      Active Member of Clubs or Organizations: No      Attends Club or Organization Meetings: Never      Marital Status:     Interpersonal Safety: Low Risk  (10/25/2024)    Interpersonal Safety      Do you feel physically and emotionally safe where you currently live?: Yes      Within the past 12 months, have you been hit, slapped, kicked or otherwise physically hurt by someone?: No      Within the past 12 months, have you been humiliated or emotionally abused in other ways by your partner or ex-partner?: No   Housing Stability: Low Risk  (12/20/2024)    Received from [facility]    Housing Stability      What is your living situation today?: I have a steady place to live   Recent Concern: Housing Stability - High Risk (10/25/2024)    Housing Stability      Do you have housing? : No      Are you worried about losing your housing?: No        SUBJECTIVE   Zahida is doing ok. She is still trying to gain some weight but her appetite is not very strong. She had hurt her back doing some stretching a while back and after numerous tests that did not show anything, she went to a massage therapist who did a trigger point release on her and she has not had back pain since. She is working with PT to regain strength and stamina. She is keeping a food journal and was surprised that some of the foods did not have as many calories as she thought. She meets with a Registered Dietician in April down at [place].      LAYNE Teague is an 85 year-old female status post robot assisted subxiphoid thoracoscopic thymectomy. Her new baseline chest CT is stable.     PLAN  I spent 25 min on the date of the encounter in chart review, patient visit, review of tests, documentation and/or discussion with other providers about the issues documented above. I reviewed the plan as follows:  Follow up with me in 1 year with a chest CT prior  All questions were answered and the patient and present family were in agreement with the plan.  I appreciate the opportunity to participate in the care of your patient and will keep you updated.  Sincerely,    Leonela Caldwell,  APRN CNS       Again, thank you for allowing me to participate in the care of your patient.        Sincerely,        SANTIAGO Collins    Electronically signed

## 2025-02-25 NOTE — NURSING NOTE
Patient confirms medications and allergies are accurate via patients echeck in completion, and or denies any changes since last reviewed/verified.       Current patient location: [address] [facility] [address]    Is the patient currently in the state of [place]? YES    Visit mode: VIDEO    If the visit is dropped, the patient can be reconnected by:VIDEO VISIT: Text to cell phone:   Telephone Information:   Mobile [phone]       Will anyone else be joining the visit? NO  (If patient encounters technical issues they should call [phone] :[phone])    Are changes needed to the allergy or medication list? No    Are refills needed on medications prescribed by this physician? NO    Rooming Documentation:  Questionnaire(s) completed    Reason for visit: No chief complaint on file.    Arlen CHAUDHRYF

## 2025-03-05 ASSESSMENT — ACTIVITIES OF DAILY LIVING (ADL)
IMPAIRMENT OF ABILITY TO RISE FROM CHAIR: NORMAL
CHEWING: NORMAL
BREATHING: NORMAL
MYC_MG-ADL-TOTAL_SCORE: 0
SWALLOWING: NORMAL
IMPAIRMENT OF ABILITY TO BRUSH TEETH/COMB HAIR: NORMAL
TALKING: NORMAL

## 2025-03-10 ENCOUNTER — OFFICE VISIT (OUTPATIENT)
Dept: NEUROLOGY | Facility: CLINIC | Age: 85
End: 2025-03-10
Payer: MEDICARE

## 2025-03-10 VITALS
HEART RATE: 105 BPM | OXYGEN SATURATION: 98 % | SYSTOLIC BLOOD PRESSURE: 143 MMHG | WEIGHT: 119.8 LBS | HEIGHT: 65 IN | DIASTOLIC BLOOD PRESSURE: 73 MMHG | BODY MASS INDEX: 19.96 KG/M2

## 2025-03-10 DIAGNOSIS — G70.00 OCULAR MYASTHENIA (H): ICD-10-CM

## 2025-03-10 DIAGNOSIS — Z79.899 LONG TERM CURRENT USE OF IMMUNOSUPPRESSIVE DRUG: Primary | ICD-10-CM

## 2025-03-10 RX ORDER — MIRTAZAPINE 7.5 MG/1
15 TABLET, FILM COATED ORAL
COMMUNITY
Start: 2025-02-10

## 2025-03-10 RX ORDER — ASPIRIN 81 MG/1
1 TABLET ORAL DAILY
COMMUNITY
Start: 2025-02-05

## 2025-03-10 ASSESSMENT — PAIN SCALES - GENERAL: PAINLEVEL_OUTOF10: NO PAIN (0)

## 2025-03-10 NOTE — PROGRESS NOTES
Betty Neff P.A.-C.   [address] [facility] [place] [address]      [place], March 10, 2025     Dear Ms Neff,    I had the pleasure to evaluate Zahida Cespedes at the [facility] MDA/neuromuscular clinic today.  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  In the past she responded well to IVIG but not to steroids or pyridostigmine. She is now well controlled on Cellcept only 500 mg daily. When we stopped Cellcept completely in 6/2021 she had reemergence of her diplopia requiring re-treatment with IVIG for a few months. Last IVIG dose was in 12/2021.     She gets skin exams q6 months by a dermatologist due to history of melanoma.    Last year, she was incidentally found to have an anterior mediastinal mass on a breast MRI.  Chest CT showed a 3.5 cm solid mass that was suspicious for thymoma. She underwent robotic assisted bilateral thoracoscopic thymectomy in October 2024 at the [facility], with Dr Nguyen. Pathologic diagnosis was WHO B2 stage thymoma , 4.0 cm in diameter, encapsulated, with negative margins. Postoperatively she developed atrial fibrillation and a pleural effusion that fortunately resolved.     After the thymoma resection, she has a feeling of generalized weakness, but no specific focal muscle weakness or impairment of ADL.Interestingly, this weakness improves or completely resolves with frequent meals, and taking some protein.  She did not have to take frequent meals before last fall and it is not clear what has triggered this change.    She tells me that she has had chronic right eyelid ptosis and some of it was noted many years before the myasthenia diagnosis.  This has not changed, importantly, in the last 6 months.  She gets rare diplopia only first thing in the morning for about a minute.  However, it subsequently resolves, and it does not bother her for the rest of the day.  She denies dysphagia,  "dysarthria, sialorrhea, difficulty chewing, difficulty getting up from a chair (he does not have to use her arms), or fatigue of her arms when brushing her teeth or combing her hair.  She has mild dyspnea on exertion.    MG ADL score is 4 with 2 points for ptosis, 1 for diplopia, 1 for DEUTSCH. Please note that the previous MG ADL score in 3/2024 was rated 0, but based on the patient's report it should have been rated 2 because of the chronic ptosis.    LABS: Her last CBC was on January 29, 2025.  White cell count was 7.8.  Platelet count 156, hemoglobin 15.1 and hematocrit 45.3.  Last hepatic function panel was done on the same day and was normal.    VITALS: BP (!) 143/73 (BP Location: Right arm, Patient Position: Sitting, Cuff Size: Adult Regular)   Pulse 105   Ht 1.643 m (5' 4.69\")   Wt 54.3 kg (119 lb 12.8 oz)   SpO2 98%   BMI 20.13 kg/m         Current Outpatient Medications   Medication Sig Dispense Refill    atorvastatin (LIPITOR) 20 MG tablet Take 20 mg by mouth every morning.      B Complex Vitamins (VITAMIN B COMPLEX PO) Take 1 capsule by mouth every morning.      calcium carbonate (OS-MARY) 500 MG tablet Take 2 tablets by mouth every morning.      Cholecalciferol (VITAMIN D3) 2000 UNITS CAPS Take 2,000 Units by mouth every morning.      clobetasol (TEMOVATE) 0.05 % external ointment Apply topically Every Mon, Wed, Fri Morning.      Echinacea 650 MG CAPS Take 1 mg by mouth every morning.      estradiol (ESTRACE) 0.1 MG/GM vaginal cream Place vaginally Every Mon, Wed, Fri Morning. At night      finasteride (PROSCAR) 5 MG tablet Take 5 mg by mouth every morning.      furosemide (LASIX) 20 MG tablet Take 1 tablet (20 mg) by mouth daily. 5 tablet 0    ipratropium (ATROVENT) 0.03 % nasal spray Spray 2 sprays in nostril 3 times daily as needed      Lutein 6 MG CAPS Take 1 capsule by mouth every morning.      Magnesium 400 MG CAPS Take 2 capsules by mouth every morning.      Multiple Vitamin (MULTI-VITAMINS) TABS " "Take 1 tablet by mouth every morning.      mycophenolate (GENERIC EQUIVALENT) 500 MG tablet Take 1 tablet by mouth daily. 90 tablet 2    Omega-3 Fatty Acids (FISH OIL CONCENTRATE) 300 MG CAPS Take 1,000 mg by mouth every morning.       No current facility-administered medications for this visit.       EXAM shows right eyelid aponeurotic ptosis with clear evidence of double crease.  However, there is also some mild fatigue of the ptosis with sustained upward gaze for 45 seconds.  There is no ptosis on the left.  There is no diplopia in any gaze position today, and ductions and versions of the eyes are normal.  There is minimal weakness of orbicularis oculi on both sides.  Orbicularis oris strength is probably normal to minimally weak when testing cheek puff.  Strength of neck flexion and extension is normal.  Strength of uvula, jaw, palate, and tongue is normal.  There is no dysarthria or dysphonia.  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  Interestingly, biceps, triceps, finger extensor and hand  strength are 5 bilaterally.  Hip flexion is 5 bilaterally.      IMPRESSION: I told Zahida that I do not believe that she has a myasthenia exacerbation. Generalized weakness in myasthenia does not respond to frequent meals, so I suspect something else is going on that may not necessarily be neurologic.  She gets rare double vision for at most a minute in the morning but it then resolves.  Along those lines, I do not believe that she needs IVIG treatment right now.  I would continue CellCept at a low dose of 500 mg daily, and she should be checking CBC regularly every 6 months.  I encouraged her to stop the magnesium supplements that she is taking, because those are contraindicated in myasthenia.  She can take the rest of her supplements.  She should discuss her need for " frequent meals with her primary care doctor and dietitian to figure out why this is happening.    She will return for follow-up in 1 year, sooner if needed.    Sincerely,         Erick Fernandez MD , FAAN     The longitudinal plan of care for the diagnosis(es)/condition(s) as documented were addressed during this visit. Due to the added complexity in care, I will continue to support Zahida in the subsequent management and with ongoing continuity of care.    Billing: MDM level 4 (moderate) based on  A) Amount/Complexity: Review of the results of 3 or more unique tests, see above (surgical path after thymoma resection 10/2024, CBC and CMP from 1/2025)  B) Risk: Moderate because we are doing prescription drug management (CellCept, and monitoring with labs).

## 2025-03-10 NOTE — PATIENT INSTRUCTIONS
Follow up in 1 year  I do not believe that your generalized weakness that improves with frequent meals is related to myasthenia. Therefore, I don't think you need IVIG now    I would ask you to check your blood counts every 6 months due to long term use of the Cellcept drug    Please do not take magnesium supplements.  They are contraindicated in myasthenia.

## 2025-03-10 NOTE — NURSING NOTE
Chief Complaint   Patient presents with    RECHECK    Myasthenia Gravis     Vitals were taken and medications were reconciled.   Nino Jauregui, EMT  1:14 PM

## 2025-03-10 NOTE — LETTER
3/10/2025       RE: Zahida Cespedes  [address] [facility]  [place] [address]     Dear Colleague,    Thank you for referring your patient, Zahida Cespedes, to the [facility]OLOG[facility] [place] [facility]. Please see a copy of my visit note below.    Betty Neff P.A.-C.   [address] [place] [address]      [place], March 10, 2025     Dear Ms Neff,    I had the pleasure to evaluate Zahida Cespedes at the [facility] MDA/neuromuscular clinic today.  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  In the past she responded well to IVIG but not to steroids or pyridostigmine. She is now well controlled on Cellcept only 500 mg daily. When we stopped Cellcept completely in 6/2021 she had reemergence of her diplopia requiring re-treatment with IVIG for a few months. Last IVIG dose was in 12/2021.     She gets skin exams q6 months by a dermatologist due to history of melanoma.    Last year, she was incidentally found to have an anterior mediastinal mass on a breast MRI.  Chest CT showed a 3.5 cm solid mass that was suspicious for thymoma. She underwent robotic assisted bilateral thoracoscopic thymectomy in October 2024 at the [facility], with Dr Nguyen. Pathologic diagnosis was WHO B2 stage thymoma , 4.0 cm in diameter, encapsulated, with negative margins. Postoperatively she developed atrial fibrillation and a pleural effusion that fortunately resolved.     After the thymoma resection, she has a feeling of generalized weakness, but no specific focal muscle weakness or impairment of ADL.Interestingly, this weakness improves or completely resolves with frequent meals, and taking some protein.  She did not have to take frequent meals before last fall and it is not clear what has triggered this change.    She tells me that she has had chronic right eyelid ptosis  "and some of it was noted many years before the myasthenia diagnosis.  This has not changed, importantly, in the last 6 months.  She gets rare diplopia only first thing in the morning for about a minute.  However, it subsequently resolves, and it does not bother her for the rest of the day.  She denies dysphagia, dysarthria, sialorrhea, difficulty chewing, difficulty getting up from a chair (he does not have to use her arms), or fatigue of her arms when brushing her teeth or combing her hair.  She has mild dyspnea on exertion.    MG ADL score is 4 with 2 points for ptosis, 1 for diplopia, 1 for DEUTSCH. Please note that the previous MG ADL score in 3/2024 was rated 0, but based on the patient's report it should have been rated 2 because of the chronic ptosis.    LABS: Her last CBC was on January 29, 2025.  White cell count was 7.8.  Platelet count 156, hemoglobin 15.1 and hematocrit 45.3.  Last hepatic function panel was done on the same day and was normal.    VITALS: BP (!) 143/73 (BP Location: Right arm, Patient Position: Sitting, Cuff Size: Adult Regular)   Pulse 105   Ht 1.643 m (5' 4.69\")   Wt 54.3 kg (119 lb 12.8 oz)   SpO2 98%   BMI 20.13 kg/m         Current Outpatient Medications   Medication Sig Dispense Refill     atorvastatin (LIPITOR) 20 MG tablet Take 20 mg by mouth every morning.       B Complex Vitamins (VITAMIN B COMPLEX PO) Take 1 capsule by mouth every morning.       calcium carbonate (OS-MARY) 500 MG tablet Take 2 tablets by mouth every morning.       Cholecalciferol (VITAMIN D3) 2000 UNITS CAPS Take 2,000 Units by mouth every morning.       clobetasol (TEMOVATE) 0.05 % external ointment Apply topically Every Mon, Wed, Fri Morning.       Echinacea 650 MG CAPS Take 1 mg by mouth every morning.       estradiol (ESTRACE) 0.1 MG/GM vaginal cream Place vaginally Every Mon, Wed, Fri Morning. At night       finasteride (PROSCAR) 5 MG tablet Take 5 mg by mouth every morning.       furosemide (LASIX) 20 MG " "tablet Take 1 tablet (20 mg) by mouth daily. 5 tablet 0     ipratropium (ATROVENT) 0.03 % nasal spray Spray 2 sprays in nostril 3 times daily as needed       Lutein 6 MG CAPS Take 1 capsule by mouth every morning.       Magnesium 400 MG CAPS Take 2 capsules by mouth every morning.       Multiple Vitamin (MULTI-VITAMINS) TABS Take 1 tablet by mouth every morning.       mycophenolate (GENERIC EQUIVALENT) 500 MG tablet Take 1 tablet by mouth daily. 90 tablet 2     Omega-3 Fatty Acids (FISH OIL CONCENTRATE) 300 MG CAPS Take 1,000 mg by mouth every morning.       No current facility-administered medications for this visit.       EXAM shows right eyelid aponeurotic ptosis with clear evidence of double crease.  However, there is also some mild fatigue of the ptosis with sustained upward gaze for 45 seconds.  There is no ptosis on the left.  There is no diplopia in any gaze position today, and ductions and versions of the eyes are normal.  There is minimal weakness of orbicularis oculi on both sides.  Orbicularis oris strength is probably normal to minimally weak when testing cheek puff.  Strength of neck flexion and extension is normal.  Strength of uvula, jaw, palate, and tongue is normal.  There is no dysarthria or dysphonia.  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  Interestingly, biceps, triceps, finger extensor and hand  strength are 5 bilaterally.  Hip flexion is 5 bilaterally.      IMPRESSION: I told Zahida that I do not believe that she has a myasthenia exacerbation. Generalized weakness in myasthenia does not respond to frequent meals, so I suspect something else is going on that may not necessarily be neurologic.  She gets rare double vision for at most a minute in the morning but it then resolves.  Along those lines, I do not believe that she needs IVIG " treatment right now.  I would continue CellCept at a low dose of 500 mg daily, and she should be checking CBC regularly every 6 months.  I encouraged her to stop the magnesium supplements that she is taking, because those are contraindicated in myasthenia.  She can take the rest of her supplements.  She should discuss her need for frequent meals with her primary care doctor and dietitian to figure out why this is happening.    She will return for follow-up in 1 year, sooner if needed.    Sincerely,         Erick Fernandez MD , FAAN     The longitudinal plan of care for the diagnosis(es)/condition(s) as documented were addressed during this visit. Due to the added complexity in care, I will continue to support Zahida in the subsequent management and with ongoing continuity of care.    Billing: MDM level 4 (moderate) based on  A) Amount/Complexity: Review of the results of 3 or more unique tests, see above (surgical path after thymoma resection 10/2024, CBC and CMP from 1/2025)  B) Risk: Moderate because we are doing prescription drug management (CellCept, and monitoring with labs).        Again, thank you for allowing me to participate in the care of your patient.      Sincerely,    Erick Fernandez MD

## (undated) DEVICE — SUCTION MANIFOLD NEPTUNE 2 SYS 4 PORT 0702-020-000

## (undated) DEVICE — Device

## (undated) DEVICE — SUCTION DRY CHEST DRAIN OASIS 3600-100

## (undated) DEVICE — ATTACHMENT DEVICE DRAIN/TUBE 9781

## (undated) DEVICE — SYR 30ML LL W/O NDL 302832

## (undated) DEVICE — DRSG STERI STRIP 1/2X4" R1547

## (undated) DEVICE — ANTIFOG SOLUTION SEE SHARP 150M TROCAR SWABS 30978 (COI)

## (undated) DEVICE — SOL WATER IRRIG 1000ML BOTTLE 2F7114

## (undated) DEVICE — DAVINCI XI FCP CADIERE 470049

## (undated) DEVICE — DAVINCI XI OBTURATOR BLADELESS 8MM 470359

## (undated) DEVICE — BLADE SAW STRK STERNAL 6207-97-101

## (undated) DEVICE — ESU ELEC BLADE 2.75" COATED/INSULATED E1455

## (undated) DEVICE — ADH LIQUID MASTISOL TOPICAL VIAL 2-3ML 0523-48

## (undated) DEVICE — DAVINCI XI TISSUE SEALER SYNCROSEAL 8MM 480440

## (undated) DEVICE — DRSG PRIMAPORE 02X3" 7133

## (undated) DEVICE — WIPES FOLEY CARE SURESTEP PROVON DFC100

## (undated) DEVICE — DAVINCI XI DRAPE ARM 470015

## (undated) DEVICE — DAVINCI XI REDUCER 8-12MM 470381

## (undated) DEVICE — DRAPE U SPLIT 74X120" 29440

## (undated) DEVICE — SU MONOCRYL 4-0 PS-2 27" UND Y426H

## (undated) DEVICE — DRAPE SPLIT SHEET 77X108 REINFORCED 29436

## (undated) DEVICE — ESU GROUND PAD ADULT W/CORD E7507

## (undated) DEVICE — LINEN TOWEL PACK X6 WHITE 5487

## (undated) DEVICE — CONNECTOR BLAKE DRAIN SGL BCC1

## (undated) DEVICE — SU SILK 0 SH 30" K834H

## (undated) DEVICE — ENDO DISSECTOR KITTNER CIGARETTE ROLL4"X4" 15505/25

## (undated) DEVICE — DRAPE IOBAN INCISE 23X17" 6650EZ

## (undated) DEVICE — GLOVE BIOGEL PI MICRO SZ 7.5 48575

## (undated) DEVICE — CUP AND LID 2PK 2OZ STERILE  SSK9006A

## (undated) DEVICE — DAVINCI XI SEAL UNIVERSAL 5-12MM 470500

## (undated) DEVICE — DAVINCI XI DRAPE COLUMN 470341

## (undated) DEVICE — LINEN TOWEL PACK X30 5481

## (undated) DEVICE — POUCH TISSUE RETRIEVAL ROBOTIC 12MM 5.5" INTRO TRS-ROBO-12

## (undated) DEVICE — SU VICRYL+ 3-0 27IN SH UND VCP416H

## (undated) DEVICE — PREP CHLORAPREP 26ML TINTED HI-LITE ORANGE 930815

## (undated) DEVICE — CATH FOLYSIL 16FR 15ML AA6116

## (undated) DEVICE — SU VICRYL+ 0 27 UR6 VLT VCP603H

## (undated) DEVICE — ESU PENCIL W/ROCKER SWITCH BLADE HOLSTER E2350HDB

## (undated) RX ORDER — PROPOFOL 10 MG/ML
INJECTION, EMULSION INTRAVENOUS
Status: DISPENSED
Start: 2024-10-24

## (undated) RX ORDER — HYDROMORPHONE HCL IN WATER/PF 6 MG/30 ML
PATIENT CONTROLLED ANALGESIA SYRINGE INTRAVENOUS
Status: DISPENSED
Start: 2024-10-24

## (undated) RX ORDER — BUPIVACAINE HYDROCHLORIDE AND EPINEPHRINE 2.5; 5 MG/ML; UG/ML
INJECTION, SOLUTION EPIDURAL; INFILTRATION; INTRACAUDAL; PERINEURAL
Status: DISPENSED
Start: 2024-10-24

## (undated) RX ORDER — EPHEDRINE SULFATE 50 MG/ML
INJECTION, SOLUTION INTRAMUSCULAR; INTRAVENOUS; SUBCUTANEOUS
Status: DISPENSED
Start: 2024-10-24

## (undated) RX ORDER — FENTANYL CITRATE 50 UG/ML
INJECTION, SOLUTION INTRAMUSCULAR; INTRAVENOUS
Status: DISPENSED
Start: 2024-10-24

## (undated) RX ORDER — GLYCOPYRROLATE 0.2 MG/ML
INJECTION, SOLUTION INTRAMUSCULAR; INTRAVENOUS
Status: DISPENSED
Start: 2024-10-24

## (undated) RX ORDER — ENOXAPARIN SODIUM 100 MG/ML
INJECTION SUBCUTANEOUS
Status: DISPENSED
Start: 2024-10-24

## (undated) RX ORDER — ONDANSETRON 2 MG/ML
INJECTION INTRAMUSCULAR; INTRAVENOUS
Status: DISPENSED
Start: 2024-10-24

## (undated) RX ORDER — CEFAZOLIN SODIUM/WATER 2 G/20 ML
SYRINGE (ML) INTRAVENOUS
Status: DISPENSED
Start: 2024-10-24